# Patient Record
Sex: FEMALE | Race: WHITE | NOT HISPANIC OR LATINO | Employment: OTHER | URBAN - METROPOLITAN AREA
[De-identification: names, ages, dates, MRNs, and addresses within clinical notes are randomized per-mention and may not be internally consistent; named-entity substitution may affect disease eponyms.]

---

## 2017-05-01 DIAGNOSIS — I10 ESSENTIAL HYPERTENSION: ICD-10-CM

## 2017-05-01 RX ORDER — LISINOPRIL AND HYDROCHLOROTHIAZIDE 20; 25 MG/1; MG/1
1 TABLET ORAL DAILY
Qty: 90 TABLET | Refills: 0 | Status: SHIPPED | OUTPATIENT
Start: 2017-05-01 | End: 2018-01-23 | Stop reason: SDUPTHER

## 2017-05-01 NOTE — TELEPHONE ENCOUNTER
----- Message from Darrian Covarrubias sent at 5/1/2017  9:56 AM CDT -----  Contact: Self  REFILL:lisinopril-hydrochlorothiazide (PRINZIDE,ZESTORETIC) 20-25 mg Tab    Patient would like to get a refill says that she is on her last pack. She no longer has [employer] and now have [employer] and she says that Dr. De is not on her plan. I let her know that [facility] does take [employer] and she should either look in her Participating Providers book that usually come with a new insurance packet or call customer service on back of her insurance card to double check.   Patient acknowledged verbally.

## 2018-01-22 DIAGNOSIS — Z11.59 ENCOUNTER FOR HEPATITIS C SCREENING TEST FOR LOW RISK PATIENT: Primary | ICD-10-CM

## 2018-01-23 ENCOUNTER — OFFICE VISIT (OUTPATIENT)
Dept: FAMILY MEDICINE | Facility: CLINIC | Age: 63
End: 2018-01-23
Payer: COMMERCIAL

## 2018-01-23 ENCOUNTER — LAB VISIT (OUTPATIENT)
Dept: LAB | Facility: HOSPITAL | Age: 63
End: 2018-01-23
Attending: FAMILY MEDICINE
Payer: COMMERCIAL

## 2018-01-23 VITALS
HEART RATE: 80 BPM | SYSTOLIC BLOOD PRESSURE: 146 MMHG | BODY MASS INDEX: 39.82 KG/M2 | HEIGHT: 60 IN | RESPIRATION RATE: 14 BRPM | WEIGHT: 202.81 LBS | TEMPERATURE: 98 F | DIASTOLIC BLOOD PRESSURE: 72 MMHG | OXYGEN SATURATION: 97 %

## 2018-01-23 DIAGNOSIS — Z00.00 ANNUAL PHYSICAL EXAM: ICD-10-CM

## 2018-01-23 DIAGNOSIS — Z11.59 ENCOUNTER FOR HEPATITIS C SCREENING TEST FOR LOW RISK PATIENT: ICD-10-CM

## 2018-01-23 DIAGNOSIS — J42 CHRONIC BRONCHITIS, UNSPECIFIED CHRONIC BRONCHITIS TYPE: Primary | ICD-10-CM

## 2018-01-23 DIAGNOSIS — I10 ESSENTIAL HYPERTENSION: ICD-10-CM

## 2018-01-23 LAB
ALBUMIN SERPL BCP-MCNC: 3.4 G/DL
ALP SERPL-CCNC: 67 U/L
ALT SERPL W/O P-5'-P-CCNC: 22 U/L
ANION GAP SERPL CALC-SCNC: 8 MMOL/L
AST SERPL-CCNC: 21 U/L
BASOPHILS # BLD AUTO: 0.05 K/UL
BASOPHILS NFR BLD: 0.6 %
BILIRUB SERPL-MCNC: 0.5 MG/DL
BUN SERPL-MCNC: 18 MG/DL
CALCIUM SERPL-MCNC: 9.6 MG/DL
CHLORIDE SERPL-SCNC: 101 MMOL/L
CHOLEST SERPL-MCNC: 225 MG/DL
CHOLEST/HDLC SERPL: 5 {RATIO}
CO2 SERPL-SCNC: 30 MMOL/L
CREAT SERPL-MCNC: 1.1 MG/DL
DIFFERENTIAL METHOD: NORMAL
EOSINOPHIL # BLD AUTO: 0.2 K/UL
EOSINOPHIL NFR BLD: 2.6 %
ERYTHROCYTE [DISTWIDTH] IN BLOOD BY AUTOMATED COUNT: 13.4 %
EST. GFR  (AFRICAN AMERICAN): >60 ML/MIN/1.73 M^2
EST. GFR  (NON AFRICAN AMERICAN): 53.2 ML/MIN/1.73 M^2
GLUCOSE SERPL-MCNC: 101 MG/DL
HCT VFR BLD AUTO: 45 %
HDLC SERPL-MCNC: 45 MG/DL
HDLC SERPL: 20 %
HGB BLD-MCNC: 14.7 G/DL
IMM GRANULOCYTES # BLD AUTO: 0.03 K/UL
IMM GRANULOCYTES NFR BLD AUTO: 0.3 %
LDLC SERPL CALC-MCNC: 151.4 MG/DL
LYMPHOCYTES # BLD AUTO: 2.1 K/UL
LYMPHOCYTES NFR BLD: 23.4 %
MCH RBC QN AUTO: 29 PG
MCHC RBC AUTO-ENTMCNC: 32.7 G/DL
MCV RBC AUTO: 89 FL
MONOCYTES # BLD AUTO: 0.8 K/UL
MONOCYTES NFR BLD: 9.3 %
NEUTROPHILS # BLD AUTO: 5.6 K/UL
NEUTROPHILS NFR BLD: 63.8 %
NONHDLC SERPL-MCNC: 180 MG/DL
NRBC BLD-RTO: 0 /100 WBC
PLATELET # BLD AUTO: 224 K/UL
PMV BLD AUTO: 12.2 FL
POTASSIUM SERPL-SCNC: 4.8 MMOL/L
PROT SERPL-MCNC: 7.6 G/DL
RBC # BLD AUTO: 5.07 M/UL
SODIUM SERPL-SCNC: 139 MMOL/L
T4 FREE SERPL-MCNC: 1.21 NG/DL
TRIGL SERPL-MCNC: 143 MG/DL
TSH SERPL DL<=0.005 MIU/L-ACNC: 1.69 UIU/ML
WBC # BLD AUTO: 8.79 K/UL

## 2018-01-23 PROCEDURE — 80061 LIPID PANEL: CPT

## 2018-01-23 PROCEDURE — 85025 COMPLETE CBC W/AUTO DIFF WBC: CPT

## 2018-01-23 PROCEDURE — 99396 PREV VISIT EST AGE 40-64: CPT | Mod: S$GLB,,, | Performed by: FAMILY MEDICINE

## 2018-01-23 PROCEDURE — 99999 PR PBB SHADOW E&M-EST. PATIENT-LVL III: CPT | Mod: PBBFAC,,, | Performed by: FAMILY MEDICINE

## 2018-01-23 PROCEDURE — 86803 HEPATITIS C AB TEST: CPT

## 2018-01-23 PROCEDURE — 36415 COLL VENOUS BLD VENIPUNCTURE: CPT | Mod: PO

## 2018-01-23 PROCEDURE — 80053 COMPREHEN METABOLIC PANEL: CPT

## 2018-01-23 PROCEDURE — 84439 ASSAY OF FREE THYROXINE: CPT

## 2018-01-23 PROCEDURE — 84443 ASSAY THYROID STIM HORMONE: CPT

## 2018-01-23 RX ORDER — BUDESONIDE AND FORMOTEROL FUMARATE DIHYDRATE 80; 4.5 UG/1; UG/1
2 AEROSOL RESPIRATORY (INHALATION)
COMMUNITY
End: 2018-10-09

## 2018-01-23 RX ORDER — LISINOPRIL AND HYDROCHLOROTHIAZIDE 20; 25 MG/1; MG/1
1 TABLET ORAL DAILY
Qty: 90 TABLET | Refills: 2 | Status: SHIPPED | OUTPATIENT
Start: 2018-01-23 | End: 2018-10-09 | Stop reason: SDUPTHER

## 2018-01-23 RX ORDER — ALBUTEROL SULFATE 90 UG/1
2 AEROSOL, METERED RESPIRATORY (INHALATION) EVERY 6 HOURS PRN
Qty: 1 EACH | Refills: 11 | Status: SHIPPED | OUTPATIENT
Start: 2018-01-23 | End: 2019-03-22 | Stop reason: SDUPTHER

## 2018-01-23 NOTE — PROGRESS NOTES
Chief Complaint   Patient presents with    Hypertension       HPI  Chanel Esparza is a 62 y.o. female with multiple medical diagnoses as listed in the medical history and problem list that presents for follow up.    Chronic bronchitis - states exacerbation few months ago, needs refills today    HTN - overall doing well, needs refills today also, no CP, HA or blurry vision.     Pt is known to me and was last seen by me on 2016.    PAST MEDICAL HISTORY:  Past Medical History:   Diagnosis Date    Emphysema of lung     Hypertension        PAST SURGICAL HISTORY:  Past Surgical History:   Procedure Laterality Date     SECTION      CHOLECYSTECTOMY      HYSTERECTOMY      CAPRICE       SOCIAL HISTORY:  Social History     Social History    Marital status:      Spouse name: N/A    Number of children: N/A    Years of education: N/A     Occupational History    Not on file.     Social History Main Topics    Smoking status: Light Tobacco Smoker     Types: Cigarettes    Smokeless tobacco: Never Used    Alcohol use No    Drug use: No    Sexual activity: No     Other Topics Concern    Not on file     Social History Narrative    No narrative on file       FAMILY HISTORY:  Family History   Problem Relation Age of Onset    Hypertension Mother        ALLERGIES AND MEDICATIONS: updated and reviewed.  Review of patient's allergies indicates:  No Known Allergies  Current Outpatient Prescriptions   Medication Sig Dispense Refill    aspirin (ECOTRIN) 81 MG EC tablet Take 81 mg by mouth once daily.      budesonide-formoterol 80-4.5 mcg (SYMBICORT) 80-4.5 mcg/actuation HFAA Inhale 2 puffs into the lungs. Controller      CETIRIZINE HCL (ZYRTEC ORAL) Take by mouth.      lisinopril-hydrochlorothiazide (PRINZIDE,ZESTORETIC) 20-25 mg Tab Take 1 tablet by mouth once daily. 90 tablet 2    albuterol 90 mcg/actuation inhaler Inhale 2 puffs into the lungs every 6 (six) hours as needed for Wheezing. 1 each      azithromycin (ZITHROMAX Z-BROOKE) 250 MG tablet 2 tabs today, then 1 tab per day for 4 days. 6 tablet 0     No current facility-administered medications for this visit.        ROS  Review of Systems   Constitutional: Negative for activity change, appetite change and fever.   HENT: Negative for congestion and sore throat.    Eyes: Negative for visual disturbance.   Respiratory: Positive for cough. Negative for shortness of breath.    Cardiovascular: Negative for chest pain.   Gastrointestinal: Negative for abdominal pain, diarrhea, nausea and vomiting.   Endocrine: Negative.    Genitourinary: Negative for dysuria.   Musculoskeletal: Negative for arthralgias and back pain.   Skin: Negative for rash.   Allergic/Immunologic: Negative.    Neurological: Negative for dizziness, weakness and headaches.   Hematological: Negative.    Psychiatric/Behavioral: Negative for agitation and confusion.       Physical Exam  Vitals:    01/23/18 1020   BP: (!) 146/72   Pulse: 80   Resp: 14   Temp: 97.6 °F (36.4 °C)    Body mass index is 39.61 kg/m².  Weight: 92 kg (202 lb 13.2 oz)   Height: 5' (152.4 cm)     Physical Exam   Constitutional: She is oriented to person, place, and time. She appears well-developed and well-nourished.   HENT:   Head: Normocephalic and atraumatic.   Eyes: Conjunctivae and EOM are normal. Pupils are equal, round, and reactive to light.   Neck: Normal range of motion. Neck supple.   Cardiovascular: Normal rate, regular rhythm and normal heart sounds.    Distant HS   Pulmonary/Chest: Effort normal and breath sounds normal.   Abdominal: Soft. Bowel sounds are normal.   Musculoskeletal: Normal range of motion.   Neurological: She is alert and oriented to person, place, and time. She has normal reflexes.   Skin: Skin is warm and dry.   Psychiatric: She has a normal mood and affect. Her behavior is normal. Judgment and thought content normal.       Health Maintenance       Date Due Completion Date    Hepatitis C  Screening 1955 ---    TETANUS VACCINE 09/21/1971 ---    Pneumococcal PPSV23 (Medium Risk) (1) 09/21/1971 ---    Colonoscopy 09/21/2003 ---    Zoster Vaccine 09/21/2013 ---    Mammogram 06/09/2017 6/9/2015 (Done)    Override on 6/9/2015: Done    Lipid Panel 10/14/2020 10/14/2015          Assessment & Plan    Chronic bronchitis, unspecified chronic bronchitis type  -     albuterol 90 mcg/actuation inhaler; Inhale 2 puffs into the lungs every 6 (six) hours as needed for Wheezing.  Dispense: 1 each; Refill: 11    Essential hypertension  -     lisinopril-hydrochlorothiazide (PRINZIDE,ZESTORETIC) 20-25 mg Tab; Take 1 tablet by mouth once daily.  Dispense: 90 tablet; Refill: 2  - Continue current medication regimen as prescribed    Annual physical exam  -     Comprehensive metabolic panel; Future; Expected date: 01/23/2018  -     T4, free; Future; Expected date: 01/23/2018  -     TSH; Future; Expected date: 01/23/2018  -     CBC auto differential; Future; Expected date: 01/23/2018  -     Lipid panel; Future; Expected date: 01/23/2018  - Counseled on age appropriate medical preventative services, including age appropriate cancer screenings, over all nutritional health, need for a consistent exercise regimen and an over all push towards maintaining a vigorous and active lifestyle.      - Counseled on age appropriate vaccines and discussed upcoming health care needs based on age/gender.  Spent time with patient counseling on need for a good patient/doctor relationship moving forward.  Discussed use of common OTC medications and supplements.  Discussed common dietary aids and use of caffeine and the need for good sleep hygiene and stress management.        Follow-up in about 3 months (around 4/23/2018), or if symptoms worsen or fail to improve.

## 2018-01-24 LAB — HCV AB SERPL QL IA: NEGATIVE

## 2018-01-25 ENCOUNTER — TELEPHONE (OUTPATIENT)
Dept: FAMILY MEDICINE | Facility: CLINIC | Age: 63
End: 2018-01-25

## 2018-01-25 NOTE — TELEPHONE ENCOUNTER
----- Message from Jayne Lopez sent at 1/25/2018  9:34 AM CST -----  Contact: [phone]  Pt is requesting a call back in regards to some health concerns Please call pt at your earliest convenience.  Thanks !

## 2018-01-25 NOTE — TELEPHONE ENCOUNTER
Patient stated at [facility], it was discussed to received a new inhaler, patient will call her insurance to see which one is covered and call the office to notify

## 2018-01-31 ENCOUNTER — CLINICAL SUPPORT (OUTPATIENT)
Dept: SMOKING CESSATION | Facility: CLINIC | Age: 63
End: 2018-01-31
Payer: COMMERCIAL

## 2018-01-31 DIAGNOSIS — F17.200 NICOTINE DEPENDENCE: Primary | ICD-10-CM

## 2018-01-31 PROCEDURE — 99999 PR PBB SHADOW E&M-EST. PATIENT-LVL I: CPT | Mod: PBBFAC,,,

## 2018-01-31 PROCEDURE — 99404 PREV MED CNSL INDIV APPRX 60: CPT | Mod: S$GLB,,,

## 2018-01-31 RX ORDER — IBUPROFEN 200 MG
1 TABLET ORAL DAILY
Qty: 14 PATCH | Refills: 0 | Status: SHIPPED | OUTPATIENT
Start: 2018-01-31 | End: 2018-02-28

## 2018-02-01 ENCOUNTER — PATIENT MESSAGE (OUTPATIENT)
Dept: FAMILY MEDICINE | Facility: CLINIC | Age: 63
End: 2018-02-01

## 2018-02-02 ENCOUNTER — PATIENT MESSAGE (OUTPATIENT)
Dept: FAMILY MEDICINE | Facility: CLINIC | Age: 63
End: 2018-02-02

## 2018-02-02 RX ORDER — FLUTICASONE FUROATE AND VILANTEROL 100; 25 UG/1; UG/1
1 POWDER RESPIRATORY (INHALATION) DAILY
Qty: 14 EACH | Refills: 0 | Status: SHIPPED | OUTPATIENT
Start: 2018-02-02 | End: 2018-02-09 | Stop reason: SDUPTHER

## 2018-02-09 RX ORDER — FLUTICASONE FUROATE AND VILANTEROL 100; 25 UG/1; UG/1
1 POWDER RESPIRATORY (INHALATION) DAILY
Qty: 14 EACH | Refills: 12 | Status: SHIPPED | OUTPATIENT
Start: 2018-02-09 | End: 2018-10-09 | Stop reason: SDUPTHER

## 2018-04-05 ENCOUNTER — HOSPITAL ENCOUNTER (EMERGENCY)
Facility: OTHER | Age: 63
Discharge: HOME | End: 2018-04-06
Attending: INTERNAL MEDICINE
Payer: COMMERCIAL

## 2018-04-05 DIAGNOSIS — J45.901 EXACERBATION OF ASTHMA, UNSPECIFIED ASTHMA SEVERITY, UNSPECIFIED WHETHER PERSISTENT: Primary | ICD-10-CM

## 2018-04-05 PROCEDURE — 63600175 PHARM REV CODE 636 W HCPCS: Performed by: INTERNAL MEDICINE

## 2018-04-05 PROCEDURE — 99283 EMERGENCY DEPT VISIT LOW MDM: CPT | Mod: 25

## 2018-04-05 PROCEDURE — 94760 N-INVAS EAR/PLS OXIMETRY 1: CPT

## 2018-04-05 PROCEDURE — 94640 AIRWAY INHALATION TREATMENT: CPT

## 2018-04-05 PROCEDURE — 25000242 PHARM REV CODE 250 ALT 637 W/ HCPCS

## 2018-04-05 RX ORDER — IPRATROPIUM BROMIDE 0.5 MG/2.5ML
SOLUTION RESPIRATORY (INHALATION)
Status: COMPLETED
Start: 2018-04-05 | End: 2018-04-05

## 2018-04-05 RX ORDER — LEVALBUTEROL 1.25 MG/.5ML
SOLUTION, CONCENTRATE RESPIRATORY (INHALATION)
Status: COMPLETED
Start: 2018-04-05 | End: 2018-04-05

## 2018-04-05 RX ORDER — LEVALBUTEROL 1.25 MG/.5ML
1.25 SOLUTION, CONCENTRATE RESPIRATORY (INHALATION) ONCE
Status: COMPLETED | OUTPATIENT
Start: 2018-04-06 | End: 2018-04-05

## 2018-04-05 RX ORDER — PREDNISONE 20 MG/1
60 TABLET ORAL
Status: COMPLETED | OUTPATIENT
Start: 2018-04-05 | End: 2018-04-05

## 2018-04-05 RX ORDER — PREDNISONE 20 MG/1
TABLET ORAL
Status: DISCONTINUED
Start: 2018-04-05 | End: 2018-04-06 | Stop reason: HOSPADM

## 2018-04-05 RX ORDER — IPRATROPIUM BROMIDE 0.5 MG/2.5ML
0.5 SOLUTION RESPIRATORY (INHALATION) ONCE
Status: COMPLETED | OUTPATIENT
Start: 2018-04-06 | End: 2018-04-05

## 2018-04-05 RX ADMIN — LEVALBUTEROL HYDROCHLORIDE 1.25 MG: 1.25 SOLUTION, CONCENTRATE RESPIRATORY (INHALATION) at 11:04

## 2018-04-05 RX ADMIN — IPRATROPIUM BROMIDE 0.5 MG: 0.5 SOLUTION RESPIRATORY (INHALATION) at 11:04

## 2018-04-05 RX ADMIN — PREDNISONE 60 MG: 20 TABLET ORAL at 11:04

## 2018-04-05 RX ADMIN — IPRATROPIUM BROMIDE: 0.5 SOLUTION RESPIRATORY (INHALATION) at 11:04

## 2018-04-05 RX ADMIN — LEVALBUTEROL HYDROCHLORIDE: 1.25 SOLUTION, CONCENTRATE RESPIRATORY (INHALATION) at 11:04

## 2018-04-06 VITALS
WEIGHT: 202 LBS | DIASTOLIC BLOOD PRESSURE: 66 MMHG | SYSTOLIC BLOOD PRESSURE: 141 MMHG | BODY MASS INDEX: 39.66 KG/M2 | HEIGHT: 60 IN | HEART RATE: 91 BPM | RESPIRATION RATE: 18 BRPM | OXYGEN SATURATION: 96 % | TEMPERATURE: 98 F

## 2018-04-06 PROCEDURE — 25000242 PHARM REV CODE 250 ALT 637 W/ HCPCS: Performed by: INTERNAL MEDICINE

## 2018-04-06 RX ORDER — PREDNISONE 20 MG/1
60 TABLET ORAL DAILY
Qty: 15 TABLET | Refills: 0 | Status: SHIPPED | OUTPATIENT
Start: 2018-04-06 | End: 2018-04-10 | Stop reason: ALTCHOICE

## 2018-04-06 RX ORDER — IPRATROPIUM BROMIDE 0.5 MG/2.5ML
0.5 SOLUTION RESPIRATORY (INHALATION) ONCE
Status: COMPLETED | OUTPATIENT
Start: 2018-04-06 | End: 2018-04-06

## 2018-04-06 RX ORDER — LEVALBUTEROL 1.25 MG/.5ML
1.25 SOLUTION, CONCENTRATE RESPIRATORY (INHALATION) ONCE
Status: COMPLETED | OUTPATIENT
Start: 2018-04-06 | End: 2018-04-06

## 2018-04-06 RX ADMIN — IPRATROPIUM BROMIDE 0.5 MG: 0.5 SOLUTION RESPIRATORY (INHALATION) at 01:04

## 2018-04-06 RX ADMIN — LEVALBUTEROL HYDROCHLORIDE 1.25 MG: 1.25 SOLUTION, CONCENTRATE RESPIRATORY (INHALATION) at 01:04

## 2018-04-06 NOTE — ED PROVIDER NOTES
Encounter Date: 2018       History     Chief Complaint   Patient presents with    Shortness of Breath     Pt has been SOB all day but this afternoon got increasingly worse. Pt presents with audible wheezing and using accessory muscles to breath. Hx asthma and COPD, quit smoking x1 month. Reports coughing and congestion as well.     62-year-old female presents to the emergency department complaining of shortness of breath and wheezing times one day.  She states wheezing has become progressively worsened although she has attempted to treat herself at home with albuterol HFA.  Denies fevers/chills, nausea/vomiting.      The history is provided by the patient. No  was used.   Shortness of Breath   This is a recurrent problem. The average episode lasts 1 day. The current episode started yesterday. The problem has not changed since onset.Associated symptoms include cough and wheezing. Pertinent negatives include no fever, no headaches, no hemoptysis, no chest pain, no vomiting, no rash, no leg pain, no leg swelling and no claudication. It is unknown what precipitated the problem. She has tried beta-agonist inhalers for the symptoms. The treatment provided mild relief. She has had prior ED visits. Associated medical issues include asthma and COPD.     Review of patient's allergies indicates:  No Known Allergies  Past Medical History:   Diagnosis Date    Asthma     Emphysema of lung     Hypertension      Past Surgical History:   Procedure Laterality Date     SECTION      CHOLECYSTECTOMY      HYSTERECTOMY      CAPRICE     Family History   Problem Relation Age of Onset    Hypertension Mother      Social History   Substance Use Topics    Smoking status: Former Smoker     Types: Cigarettes     Quit date: 3/5/2018    Smokeless tobacco: Never Used    Alcohol use No     Review of Systems   Constitutional: Negative for fever.   Respiratory: Positive for cough, shortness of breath and wheezing.  Negative for hemoptysis.    Cardiovascular: Negative for chest pain, claudication and leg swelling.   Gastrointestinal: Negative for vomiting.   Skin: Negative for rash.   Neurological: Negative for headaches.   All other systems reviewed and are negative.      Physical Exam     Initial Vitals [04/05/18 2324]   BP Pulse Resp Temp SpO2   (!) 177/100 105 (!) 30 -- 100 %      MAP       125.67         Physical Exam    Nursing note and vitals reviewed.  Constitutional: She appears well-developed and well-nourished. No distress.   Obese   HENT:   Head: Normocephalic and atraumatic.   Right Ear: External ear normal.   Left Ear: External ear normal.   Eyes: EOM are normal.   Neck: Normal range of motion. Neck supple.   Cardiovascular: Regular rhythm and intact distal pulses.   Tachycardia   Pulmonary/Chest: She has wheezes (Inspiratory and expiratory). She has no rhonchi. She has no rales. She exhibits no tenderness.   Abdominal: Soft. Bowel sounds are normal.   Musculoskeletal: Normal range of motion.   Neurological: She is alert.   Skin: Skin is warm and dry.   Psychiatric: She has a normal mood and affect. Thought content normal.         ED Course   Procedures  Labs Reviewed - No data to display          Medical Decision Making:   Initial Assessment:   62-year-old female presents to the emergency department complaining of shortness of breath and wheezing times one day.  She states wheezing has become progressively worsened although she has attempted to treat herself at home with albuterol HFA.  Denies fevers/chills, nausea/vomiting.    ED Management:  Albuterol and Atrovent nebulizer treatment was given as well as oral prednisone.  Patient states she feels much better and repeat physical exam reveals only occasional expiratory wheezes.  Patient was advised to follow-up with her primary care physician tomorrow for reevaluation and was given instructions for asthma/COPD exacerbation.  Prescription for albuterol HFA and  prednisone burst were also given.                      Clinical Impression:   The encounter diagnosis was Exacerbation of asthma, unspecified asthma severity, unspecified whether persistent.    Disposition:   Disposition: Discharged  Condition: Stable                        Brady Apodaca MD  04/06/18 0355

## 2018-04-06 NOTE — ED NOTES
Pt presented to the ED with SOB, audible wheezing, pursed lip breathing and using accessory muscles. Pt is unable to speak in complete sentences. Family reports that the pt has been SOB all day but got increasingly worse tonight at approximately 1830 and at that time the pt did an albuterol neb. Pt does not know what triaged her episode. Resp are labored, wheezing in all lobes, pt reports non-productive cough and some nasal congestion. Denies any CP or any other symptoms.

## 2018-04-10 ENCOUNTER — OFFICE VISIT (OUTPATIENT)
Dept: FAMILY MEDICINE | Facility: CLINIC | Age: 63
End: 2018-04-10
Payer: COMMERCIAL

## 2018-04-10 VITALS
BODY MASS INDEX: 42.73 KG/M2 | TEMPERATURE: 98 F | SYSTOLIC BLOOD PRESSURE: 146 MMHG | WEIGHT: 217.63 LBS | RESPIRATION RATE: 20 BRPM | HEART RATE: 78 BPM | OXYGEN SATURATION: 96 % | DIASTOLIC BLOOD PRESSURE: 86 MMHG | HEIGHT: 60 IN

## 2018-04-10 DIAGNOSIS — I10 ESSENTIAL HYPERTENSION: ICD-10-CM

## 2018-04-10 DIAGNOSIS — J45.901 EXACERBATION OF ASTHMA, UNSPECIFIED ASTHMA SEVERITY, UNSPECIFIED WHETHER PERSISTENT: ICD-10-CM

## 2018-04-10 DIAGNOSIS — I70.0 AORTIC ATHEROSCLEROSIS: ICD-10-CM

## 2018-04-10 DIAGNOSIS — J42 CHRONIC BRONCHITIS, UNSPECIFIED CHRONIC BRONCHITIS TYPE: Primary | ICD-10-CM

## 2018-04-10 PROCEDURE — 3079F DIAST BP 80-89 MM HG: CPT | Mod: CPTII,S$GLB,, | Performed by: FAMILY MEDICINE

## 2018-04-10 PROCEDURE — 99214 OFFICE O/P EST MOD 30 MIN: CPT | Mod: S$GLB,,, | Performed by: FAMILY MEDICINE

## 2018-04-10 PROCEDURE — 99999 PR PBB SHADOW E&M-EST. PATIENT-LVL III: CPT | Mod: PBBFAC,,, | Performed by: FAMILY MEDICINE

## 2018-04-10 PROCEDURE — 3077F SYST BP >= 140 MM HG: CPT | Mod: CPTII,S$GLB,, | Performed by: FAMILY MEDICINE

## 2018-04-10 NOTE — PROGRESS NOTES
Chief Complaint   Patient presents with    ER Follow-up    Asthma       HPI  Chanel Esparza is a 62 y.o. female with multiple medical diagnoses as listed in the medical history and problem list that presents for ER follow up.      Presented to ER for asthma exacerbation - improved after multiple nebs and steroid therapy. Markedly improved, no nebulizer therapy, 2 month hx of tobacco abstinence.     HTN - overall well controlled at home, denies CP, HA or blurry vision. Completed steroid therapy today.     Pt is known to me and was last seen by me on 2018.    PAST MEDICAL HISTORY:  Past Medical History:   Diagnosis Date    Asthma     Emphysema of lung     Hypertension        PAST SURGICAL HISTORY:  Past Surgical History:   Procedure Laterality Date     SECTION      CHOLECYSTECTOMY      HYSTERECTOMY      CAPRICE       SOCIAL HISTORY:  Social History     Social History    Marital status:      Spouse name: N/A    Number of children: N/A    Years of education: N/A     Occupational History    Not on file.     Social History Main Topics    Smoking status: Former Smoker     Types: Cigarettes     Quit date: 3/5/2018    Smokeless tobacco: Never Used    Alcohol use No    Drug use: No    Sexual activity: No     Other Topics Concern    Not on file     Social History Narrative    No narrative on file       FAMILY HISTORY:  Family History   Problem Relation Age of Onset    Hypertension Mother        ALLERGIES AND MEDICATIONS: updated and reviewed.  Review of patient's allergies indicates:  No Known Allergies  Current Outpatient Prescriptions   Medication Sig Dispense Refill    albuterol 90 mcg/actuation inhaler Inhale 2 puffs into the lungs every 6 (six) hours as needed for Wheezing. 1 each 11    aspirin (ECOTRIN) 81 MG EC tablet Take 81 mg by mouth once daily.      budesonide-formoterol 80-4.5 mcg (SYMBICORT) 80-4.5 mcg/actuation HFAA Inhale 2 puffs into the lungs. Controller      CETIRIZINE HCL  (ZYRTEC ORAL) Take by mouth.      fluticasone-vilanterol (BREO ELLIPTA) 100-25 mcg/dose diskus inhaler Inhale 1 puff into the lungs once daily. Controller 14 each 12    lisinopril-hydrochlorothiazide (PRINZIDE,ZESTORETIC) 20-25 mg Tab Take 1 tablet by mouth once daily. 90 tablet 2     No current facility-administered medications for this visit.        ROS  Review of Systems   Constitutional: Negative for activity change, appetite change and fever.   HENT: Negative for congestion and sore throat.    Eyes: Negative for visual disturbance.   Respiratory: Positive for cough and shortness of breath.    Cardiovascular: Negative for chest pain.   Gastrointestinal: Negative for abdominal pain, diarrhea, nausea and vomiting.   Endocrine: Negative.    Genitourinary: Negative for dysuria.   Musculoskeletal: Negative for arthralgias and back pain.   Skin: Negative for rash.   Allergic/Immunologic: Negative.    Neurological: Negative for dizziness, weakness and headaches.   Hematological: Negative.    Psychiatric/Behavioral: Negative for agitation and confusion.       Physical Exam  Vitals:    04/10/18 1108   BP: (!) 146/86   Pulse: 78   Resp: 20   Temp: 97.9 °F (36.6 °C)    Body mass index is 42.5 kg/m².  Weight: 98.7 kg (217 lb 9.5 oz)   Height: 5' (152.4 cm)     Physical Exam   Constitutional: She is oriented to person, place, and time. She appears well-developed and well-nourished.   HENT:   Head: Normocephalic and atraumatic.   Eyes: Conjunctivae and EOM are normal. Pupils are equal, round, and reactive to light.   Neck: Normal range of motion. Neck supple.   Cardiovascular: Normal rate, regular rhythm and normal heart sounds.    Distant HS   Pulmonary/Chest: Effort normal and breath sounds normal.   Dec BS global, no wheeze or rhonchi   Abdominal: Soft. Bowel sounds are normal.   Musculoskeletal: Normal range of motion.   Neurological: She is alert and oriented to person, place, and time. She has normal reflexes.   Skin:  Skin is warm and dry.   Psychiatric: She has a normal mood and affect. Her behavior is normal. Judgment and thought content normal.       Health Maintenance       Date Due Completion Date    High Dose Statin 09/21/1974 ---    Mammogram 01/23/2020 1/23/2018 (Declined)    Override on 1/23/2018: Declined    Override on 6/9/2015: Done    Lipid Panel 01/23/2023 1/23/2018    TETANUS VACCINE 12/05/2024 12/5/2014 (Done)    Override on 12/5/2014: Done    Colonoscopy 01/23/2028 1/23/2018 (Declined)    Override on 1/23/2018: Declined          Assessment & Plan    Chronic bronchitis, unspecified chronic bronchitis type, Exacerbation of asthma, unspecified asthma severity, unspecified whether persistent  - Overall markedly improved  - Discussed action plan for possible future events    Essential hypertension, Aortic atherosclerosis  - Continue current medication regimen as prescribed  - Monitor BP closely, pt completing steroid dose pack today.     Will follow up lipid panel in few months, discussed dietary changes, pt defers medication therapy.     Follow-up in about 3 months (around 7/10/2018), or if symptoms worsen or fail to improve.

## 2018-04-18 ENCOUNTER — CLINICAL SUPPORT (OUTPATIENT)
Dept: SMOKING CESSATION | Facility: CLINIC | Age: 63
End: 2018-04-18
Payer: COMMERCIAL

## 2018-04-18 DIAGNOSIS — F17.200 NICOTINE DEPENDENCE: Primary | ICD-10-CM

## 2018-04-18 PROCEDURE — 99407 BEHAV CHNG SMOKING > 10 MIN: CPT | Mod: S$GLB,,,

## 2018-06-12 DIAGNOSIS — B35.4 TINEA CORPORIS: Primary | ICD-10-CM

## 2018-06-12 RX ORDER — NYSTATIN 100000 U/G
CREAM TOPICAL 2 TIMES DAILY
Qty: 30 G | Refills: 1 | Status: SHIPPED | OUTPATIENT
Start: 2018-06-12 | End: 2020-10-09 | Stop reason: ALTCHOICE

## 2018-06-12 RX ORDER — TERBINAFINE HYDROCHLORIDE 250 MG/1
250 TABLET ORAL DAILY
Qty: 21 TABLET | Refills: 0 | Status: SHIPPED | OUTPATIENT
Start: 2018-06-12 | End: 2018-07-12

## 2018-07-11 ENCOUNTER — TELEPHONE (OUTPATIENT)
Dept: SMOKING CESSATION | Facility: CLINIC | Age: 63
End: 2018-07-11

## 2018-07-12 ENCOUNTER — TELEPHONE (OUTPATIENT)
Dept: SMOKING CESSATION | Facility: CLINIC | Age: 63
End: 2018-07-12

## 2018-07-17 ENCOUNTER — CLINICAL SUPPORT (OUTPATIENT)
Dept: SMOKING CESSATION | Facility: CLINIC | Age: 63
End: 2018-07-17
Payer: COMMERCIAL

## 2018-07-17 DIAGNOSIS — F17.200 NICOTINE DEPENDENCE: Primary | ICD-10-CM

## 2018-07-17 PROCEDURE — 99407 BEHAV CHNG SMOKING > 10 MIN: CPT | Mod: S$GLB,,,

## 2018-07-17 NOTE — PROGRESS NOTES
Called pt to f/u on her 6 month smoking cessation quit status. Pt stated she was tobacco free for 3 months, but then relapsed. Informed her she has benefits available and is able to rejoin. Pt not ready to make appointment. She will call back when ready.

## 2018-08-07 DIAGNOSIS — Z12.39 SCREENING BREAST EXAMINATION: Primary | ICD-10-CM

## 2018-08-20 DIAGNOSIS — Z12.11 COLON CANCER SCREENING: ICD-10-CM

## 2018-10-09 ENCOUNTER — OFFICE VISIT (OUTPATIENT)
Dept: FAMILY MEDICINE | Facility: CLINIC | Age: 63
End: 2018-10-09
Payer: MEDICARE

## 2018-10-09 VITALS
HEIGHT: 60 IN | WEIGHT: 209.69 LBS | SYSTOLIC BLOOD PRESSURE: 125 MMHG | RESPIRATION RATE: 16 BRPM | TEMPERATURE: 98 F | DIASTOLIC BLOOD PRESSURE: 80 MMHG | HEART RATE: 87 BPM | BODY MASS INDEX: 41.17 KG/M2 | OXYGEN SATURATION: 96 %

## 2018-10-09 DIAGNOSIS — I10 ESSENTIAL HYPERTENSION: ICD-10-CM

## 2018-10-09 DIAGNOSIS — Z23 NEED FOR INFLUENZA VACCINATION: Primary | ICD-10-CM

## 2018-10-09 DIAGNOSIS — J42 CHRONIC BRONCHITIS, UNSPECIFIED CHRONIC BRONCHITIS TYPE: ICD-10-CM

## 2018-10-09 DIAGNOSIS — J45.20 MILD INTERMITTENT ASTHMA WITHOUT COMPLICATION: ICD-10-CM

## 2018-10-09 PROBLEM — J45.901 EXACERBATION OF ASTHMA: Status: RESOLVED | Noted: 2018-04-05 | Resolved: 2018-10-09

## 2018-10-09 PROCEDURE — 1101F PT FALLS ASSESS-DOCD LE1/YR: CPT | Mod: CPTII,,, | Performed by: FAMILY MEDICINE

## 2018-10-09 PROCEDURE — 3008F BODY MASS INDEX DOCD: CPT | Mod: CPTII,,, | Performed by: FAMILY MEDICINE

## 2018-10-09 PROCEDURE — 99213 OFFICE O/P EST LOW 20 MIN: CPT | Mod: PBBFAC,PO | Performed by: FAMILY MEDICINE

## 2018-10-09 PROCEDURE — 99214 OFFICE O/P EST MOD 30 MIN: CPT | Mod: S$PBB,,, | Performed by: FAMILY MEDICINE

## 2018-10-09 PROCEDURE — 3074F SYST BP LT 130 MM HG: CPT | Mod: CPTII,,, | Performed by: FAMILY MEDICINE

## 2018-10-09 PROCEDURE — 99999 PR PBB SHADOW E&M-EST. PATIENT-LVL III: CPT | Mod: PBBFAC,,, | Performed by: FAMILY MEDICINE

## 2018-10-09 PROCEDURE — 99499 UNLISTED E&M SERVICE: CPT | Mod: S$GLB,,, | Performed by: FAMILY MEDICINE

## 2018-10-09 PROCEDURE — 3079F DIAST BP 80-89 MM HG: CPT | Mod: CPTII,,, | Performed by: FAMILY MEDICINE

## 2018-10-09 PROCEDURE — 90662 IIV NO PRSV INCREASED AG IM: CPT | Mod: PBBFAC,PO

## 2018-10-09 RX ORDER — FLUTICASONE FUROATE AND VILANTEROL 100; 25 UG/1; UG/1
1 POWDER RESPIRATORY (INHALATION) DAILY
Qty: 14 EACH | Refills: 12 | Status: SHIPPED | OUTPATIENT
Start: 2018-10-09 | End: 2019-01-09 | Stop reason: SDUPTHER

## 2018-10-09 RX ORDER — LISINOPRIL AND HYDROCHLOROTHIAZIDE 20; 25 MG/1; MG/1
1 TABLET ORAL DAILY
Qty: 90 TABLET | Refills: 2 | Status: SHIPPED | OUTPATIENT
Start: 2018-10-09 | End: 2019-01-09 | Stop reason: SDUPTHER

## 2018-10-09 NOTE — PROGRESS NOTES
Chief Complaint   Patient presents with    Flu Vaccine    Medication Refill       HPI  Chanel Esparza is a 63 y.o. female with multiple medical diagnoses as listed in the medical history and problem list that presents for follow up.      HTN - overall doing well, states well controlled at home. +weight loss. Tobacco use 15/day. Bp 118 - 120/80.     Asthma/emphysema - overall stable per patient.     Pt is known to me and was last seen by me on 4/10/2018.    PAST MEDICAL HISTORY:  Past Medical History:   Diagnosis Date    Asthma     Emphysema of lung     Hypertension        PAST SURGICAL HISTORY:  Past Surgical History:   Procedure Laterality Date     SECTION      CHOLECYSTECTOMY      HYSTERECTOMY      CAPRICE       SOCIAL HISTORY:  Social History     Socioeconomic History    Marital status:      Spouse name: Not on file    Number of children: Not on file    Years of education: Not on file    Highest education level: Not on file   Social Needs    Financial resource strain: Not on file    Food insecurity - worry: Not on file    Food insecurity - inability: Not on file    Transportation needs - medical: Not on file    Transportation needs - non-medical: Not on file   Occupational History    Not on file   Tobacco Use    Smoking status: Current Every Day Smoker     Types: Cigarettes     Last attempt to quit: 3/5/2018     Years since quittin.6    Smokeless tobacco: Never Used   Substance and Sexual Activity    Alcohol use: No     Alcohol/week: 0.0 oz    Drug use: No    Sexual activity: No   Other Topics Concern    Not on file   Social History Narrative    Not on file       FAMILY HISTORY:  Family History   Problem Relation Age of Onset    Hypertension Mother        ALLERGIES AND MEDICATIONS: updated and reviewed.  Review of patient's allergies indicates:  No Known Allergies  Current Outpatient Medications   Medication Sig Dispense Refill    albuterol 90 mcg/actuation inhaler Inhale 2  puffs into the lungs every 6 (six) hours as needed for Wheezing. 1 each 11    aspirin (ECOTRIN) 81 MG EC tablet Take 81 mg by mouth once daily.      CETIRIZINE HCL (ZYRTEC ORAL) Take by mouth.      fluticasone-vilanterol (BREO ELLIPTA) 100-25 mcg/dose diskus inhaler Inhale 1 puff into the lungs once daily. Controller 14 each 12    lisinopril-hydrochlorothiazide (PRINZIDE,ZESTORETIC) 20-25 mg Tab Take 1 tablet by mouth once daily. 90 tablet 2    nystatin (MYCOSTATIN) cream Apply topically 2 (two) times daily. 30 g 1     No current facility-administered medications for this visit.        ROS  Review of Systems   Constitutional: Negative for activity change, appetite change and fever.   HENT: Negative for congestion and sore throat.    Eyes: Negative for visual disturbance.   Respiratory: Negative for cough and shortness of breath.    Cardiovascular: Negative for chest pain.   Gastrointestinal: Negative for abdominal pain, diarrhea, nausea and vomiting.   Endocrine: Negative.    Genitourinary: Negative for dysuria.   Musculoskeletal: Negative for arthralgias and back pain.   Skin: Negative for rash.   Allergic/Immunologic: Negative.    Neurological: Negative for dizziness, weakness and headaches.   Hematological: Negative.    Psychiatric/Behavioral: Negative for agitation and confusion.       Physical Exam  Vitals:    10/09/18 0952   BP: 125/80   Pulse:    Resp:    Temp:     Body mass index is 40.95 kg/m².  Weight: 95.1 kg (209 lb 10.5 oz)   Height: 5' (152.4 cm)     Physical Exam   Constitutional: She is oriented to person, place, and time. She appears well-developed and well-nourished.   HENT:   Head: Normocephalic and atraumatic.   Eyes: Conjunctivae and EOM are normal. Pupils are equal, round, and reactive to light.   Neck: Normal range of motion. Neck supple.   Cardiovascular: Normal rate, regular rhythm and normal heart sounds.   Pulmonary/Chest: Effort normal and breath sounds normal.   Abdominal: Soft.  Bowel sounds are normal.   Musculoskeletal: Normal range of motion.   Neurological: She is alert and oriented to person, place, and time. She has normal reflexes.   Skin: Skin is warm and dry.   Psychiatric: She has a normal mood and affect. Her behavior is normal. Judgment and thought content normal.       Health Maintenance       Date Due Completion Date    High Dose Statin 09/21/1974 ---    DEXA SCAN 09/21/1993 ---    Influenza Vaccine 08/01/2018 12/11/2017 (Done)    Override on 12/11/2017: Done    Pneumococcal (65+) (1 of 2 - PCV13) 09/21/2018 ---    Mammogram 08/17/2020 8/17/2018 (Declined)    Override on 8/17/2018: Declined    Override on 1/23/2018: Declined    Override on 6/9/2015: Done    Lipid Panel 01/23/2023 1/23/2018    TETANUS VACCINE 12/05/2024 12/5/2014 (Done)    Override on 12/5/2014: Done    Colonoscopy 01/23/2028 1/23/2018 (Declined)    Override on 1/23/2018: Declined          Assessment & Plan    Need for influenza vaccination  -     Influenza - High Dose (65+) (PF) (IM)    Essential hypertension  -     lisinopril-hydrochlorothiazide (PRINZIDE,ZESTORETIC) 20-25 mg Tab; Take 1 tablet by mouth once daily.  Dispense: 90 tablet; Refill: 2  - Continue current medication regimen as prescribed    Chronic bronchitis, unspecified chronic bronchitis type, Asthma  -     fluticasone-vilanterol (BREO ELLIPTA) 100-25 mcg/dose diskus inhaler; Inhale 1 puff into the lungs once daily. Controller  Dispense: 14 each; Refill: 12  - Continue current medication regimen as prescribed        Follow-up in about 3 months (around 1/9/2019).

## 2018-10-24 NOTE — PROGRESS NOTES
Patient, Chanel Esparza (MRN #[id number]), presented with a recorded BMI of 40.95 kg/m^2 consistent with the definition of morbid obesity (ICD-10 E66.01). The patient's morbid obesity was monitored, evaluated, addressed and/or treated. This addendum to the medical record is made on 10/24/2018.

## 2018-12-27 ENCOUNTER — LAB VISIT (OUTPATIENT)
Dept: LAB | Facility: HOSPITAL | Age: 63
End: 2018-12-27
Attending: FAMILY MEDICINE
Payer: MEDICARE

## 2018-12-27 DIAGNOSIS — Z00.00 ANNUAL PHYSICAL EXAM: ICD-10-CM

## 2018-12-27 PROCEDURE — 36415 COLL VENOUS BLD VENIPUNCTURE: CPT | Mod: PO

## 2018-12-27 PROCEDURE — 86803 HEPATITIS C AB TEST: CPT

## 2018-12-28 LAB — HCV AB SERPL QL IA: NEGATIVE

## 2019-01-09 ENCOUNTER — OFFICE VISIT (OUTPATIENT)
Dept: FAMILY MEDICINE | Facility: CLINIC | Age: 64
End: 2019-01-09
Payer: MEDICARE

## 2019-01-09 VITALS
OXYGEN SATURATION: 97 % | TEMPERATURE: 98 F | BODY MASS INDEX: 41.03 KG/M2 | WEIGHT: 209 LBS | SYSTOLIC BLOOD PRESSURE: 138 MMHG | HEIGHT: 60 IN | HEART RATE: 88 BPM | DIASTOLIC BLOOD PRESSURE: 80 MMHG | RESPIRATION RATE: 18 BRPM

## 2019-01-09 DIAGNOSIS — H40.89 OTHER GLAUCOMA OF BOTH EYES: ICD-10-CM

## 2019-01-09 DIAGNOSIS — J45.20 MILD INTERMITTENT ASTHMA WITHOUT COMPLICATION: Primary | ICD-10-CM

## 2019-01-09 DIAGNOSIS — J42 CHRONIC BRONCHITIS, UNSPECIFIED CHRONIC BRONCHITIS TYPE: ICD-10-CM

## 2019-01-09 DIAGNOSIS — I10 ESSENTIAL HYPERTENSION: ICD-10-CM

## 2019-01-09 PROCEDURE — 99499 RISK ADDL DX/OHS AUDIT: ICD-10-PCS | Mod: S$GLB,,, | Performed by: FAMILY MEDICINE

## 2019-01-09 PROCEDURE — 3079F DIAST BP 80-89 MM HG: CPT | Mod: CPTII,S$GLB,, | Performed by: FAMILY MEDICINE

## 2019-01-09 PROCEDURE — 1101F PR PT FALLS ASSESS DOC 0-1 FALLS W/OUT INJ PAST YR: ICD-10-PCS | Mod: CPTII,S$GLB,, | Performed by: FAMILY MEDICINE

## 2019-01-09 PROCEDURE — 99999 PR PBB SHADOW E&M-EST. PATIENT-LVL III: CPT | Mod: PBBFAC,,, | Performed by: FAMILY MEDICINE

## 2019-01-09 PROCEDURE — 99214 PR OFFICE/OUTPT VISIT, EST, LEVL IV, 30-39 MIN: ICD-10-PCS | Mod: S$GLB,,, | Performed by: FAMILY MEDICINE

## 2019-01-09 PROCEDURE — 99214 OFFICE O/P EST MOD 30 MIN: CPT | Mod: S$GLB,,, | Performed by: FAMILY MEDICINE

## 2019-01-09 PROCEDURE — 3075F SYST BP GE 130 - 139MM HG: CPT | Mod: CPTII,S$GLB,, | Performed by: FAMILY MEDICINE

## 2019-01-09 PROCEDURE — 3008F PR BODY MASS INDEX (BMI) DOCUMENTED: ICD-10-PCS | Mod: CPTII,S$GLB,, | Performed by: FAMILY MEDICINE

## 2019-01-09 PROCEDURE — 3075F PR MOST RECENT SYSTOLIC BLOOD PRESS GE 130-139MM HG: ICD-10-PCS | Mod: CPTII,S$GLB,, | Performed by: FAMILY MEDICINE

## 2019-01-09 PROCEDURE — 99999 PR PBB SHADOW E&M-EST. PATIENT-LVL III: ICD-10-PCS | Mod: PBBFAC,,, | Performed by: FAMILY MEDICINE

## 2019-01-09 PROCEDURE — 3079F PR MOST RECENT DIASTOLIC BLOOD PRESSURE 80-89 MM HG: ICD-10-PCS | Mod: CPTII,S$GLB,, | Performed by: FAMILY MEDICINE

## 2019-01-09 PROCEDURE — 3008F BODY MASS INDEX DOCD: CPT | Mod: CPTII,S$GLB,, | Performed by: FAMILY MEDICINE

## 2019-01-09 PROCEDURE — 1101F PT FALLS ASSESS-DOCD LE1/YR: CPT | Mod: CPTII,S$GLB,, | Performed by: FAMILY MEDICINE

## 2019-01-09 PROCEDURE — 99499 UNLISTED E&M SERVICE: CPT | Mod: S$GLB,,, | Performed by: FAMILY MEDICINE

## 2019-01-09 RX ORDER — FLUTICASONE FUROATE AND VILANTEROL 100; 25 UG/1; UG/1
1 POWDER RESPIRATORY (INHALATION) DAILY
Qty: 14 EACH | Refills: 12 | Status: SHIPPED | OUTPATIENT
Start: 2019-01-09 | End: 2019-10-01 | Stop reason: SDUPTHER

## 2019-01-09 RX ORDER — LISINOPRIL AND HYDROCHLOROTHIAZIDE 20; 25 MG/1; MG/1
1 TABLET ORAL DAILY
Qty: 90 TABLET | Refills: 2 | Status: SHIPPED | OUTPATIENT
Start: 2019-01-09 | End: 2019-03-22 | Stop reason: SDUPTHER

## 2019-01-09 NOTE — PROGRESS NOTES
Chief Complaint   Patient presents with    Follow-up       HPI  Chanel Esparza is a 63 y.o. female with multiple medical diagnoses as listed in the medical history and problem list that presents for follow up.    HTN - pverall doing well, complaint with medications.     Chronic bronchitis - overall doing well with current meds, daily breo use and decreased rescue use.     Pt is known to me and was last seen by me on 10/9/2018.    PAST MEDICAL HISTORY:  Past Medical History:   Diagnosis Date    Asthma     Emphysema of lung     Hypertension        PAST SURGICAL HISTORY:  Past Surgical History:   Procedure Laterality Date     SECTION      CHOLECYSTECTOMY      HYSTERECTOMY      CAPRICE       SOCIAL HISTORY:  Social History     Socioeconomic History    Marital status:      Spouse name: Not on file    Number of children: Not on file    Years of education: Not on file    Highest education level: Not on file   Social Needs    Financial resource strain: Not on file    Food insecurity - worry: Not on file    Food insecurity - inability: Not on file    Transportation needs - medical: Not on file    Transportation needs - non-medical: Not on file   Occupational History    Not on file   Tobacco Use    Smoking status: Current Every Day Smoker     Types: Cigarettes     Last attempt to quit: 3/5/2018     Years since quittin.8    Smokeless tobacco: Never Used   Substance and Sexual Activity    Alcohol use: No     Alcohol/week: 0.0 oz    Drug use: No    Sexual activity: No   Other Topics Concern    Not on file   Social History Narrative    Not on file       FAMILY HISTORY:  Family History   Problem Relation Age of Onset    Hypertension Mother        ALLERGIES AND MEDICATIONS: updated and reviewed.  Review of patient's allergies indicates:  No Known Allergies  Current Outpatient Medications   Medication Sig Dispense Refill    albuterol 90 mcg/actuation inhaler Inhale 2 puffs into the lungs every 6  (six) hours as needed for Wheezing. 1 each 11    aspirin (ECOTRIN) 81 MG EC tablet Take 81 mg by mouth once daily.      CETIRIZINE HCL (ZYRTEC ORAL) Take by mouth.      fluticasone-vilanterol (BREO ELLIPTA) 100-25 mcg/dose diskus inhaler Inhale 1 puff into the lungs once daily. Controller 14 each 12    lisinopril-hydrochlorothiazide (PRINZIDE,ZESTORETIC) 20-25 mg Tab Take 1 tablet by mouth once daily. 90 tablet 2    nystatin (MYCOSTATIN) cream Apply topically 2 (two) times daily. 30 g 1     No current facility-administered medications for this visit.        ROS  Review of Systems   Constitutional: Negative for activity change, appetite change and fever.   HENT: Negative for congestion and sore throat.    Eyes: Negative for visual disturbance.   Respiratory: Positive for cough.    Cardiovascular: Negative for chest pain.   Gastrointestinal: Negative for abdominal pain, diarrhea, nausea and vomiting.   Endocrine: Negative.    Genitourinary: Negative for dysuria.   Musculoskeletal: Negative for arthralgias and back pain.   Skin: Negative for rash.   Allergic/Immunologic: Negative.    Neurological: Negative for dizziness, weakness and headaches.   Hematological: Negative.    Psychiatric/Behavioral: Negative for agitation and confusion.       Physical Exam  Vitals:    01/09/19 0906   BP: 138/80   Pulse: 88   Resp: 18   Temp: 97.7 °F (36.5 °C)    Body mass index is 40.82 kg/m².  Weight: 94.8 kg (208 lb 15.9 oz)   Height: 5' (152.4 cm)     Physical Exam   Constitutional: She is oriented to person, place, and time. She appears well-developed and well-nourished.   HENT:   Head: Normocephalic and atraumatic.   Eyes: Conjunctivae and EOM are normal. Pupils are equal, round, and reactive to light.   Neck: Normal range of motion. Neck supple.   Cardiovascular: Normal rate, regular rhythm and normal heart sounds.   Pulmonary/Chest: Effort normal and breath sounds normal.   Abdominal: Soft. Bowel sounds are normal.    Musculoskeletal: Normal range of motion.   Neurological: She is alert and oriented to person, place, and time. She has normal reflexes.   Skin: Skin is warm and dry.   Psychiatric: She has a normal mood and affect. Her behavior is normal. Judgment and thought content normal.       Health Maintenance       Date Due Completion Date    DEXA SCAN 09/21/1993 ---    High Dose Statin 01/22/2019 (Originally 9/21/1974) ---    Pneumococcal Vaccine (65+ Low/Medium Risk) (1 of 2 - PCV13) 10/09/2019 (Originally 9/21/2018) ---    Mammogram 08/17/2020 8/17/2018 (Declined)    Override on 8/17/2018: Declined    Override on 1/23/2018: Declined    Override on 6/9/2015: Done    Lipid Panel 01/23/2023 1/23/2018    TETANUS VACCINE 12/05/2024 12/5/2014 (Done)    Override on 12/5/2014: Done    Colonoscopy 01/23/2028 1/23/2018 (Declined)    Override on 1/23/2018: Declined          Assessment & Plan    Mild intermittent asthma without complication, Chronic bronchitis, unspecified chronic bronchitis type  -     fluticasone-vilanterol (BREO ELLIPTA) 100-25 mcg/dose diskus inhaler; Inhale 1 puff into the lungs once daily. Controller  Dispense: 14 each; Refill: 12  - Continue current medication regimen as prescribed  - Overall stable    Essential hypertension  -     lisinopril-hydrochlorothiazide (PRINZIDE,ZESTORETIC) 20-25 mg Tab; Take 1 tablet by mouth once daily.  Dispense: 90 tablet; Refill: 2  - Continue current medication regimen as prescribed  - Overall doing well.     Discussed weight loss and exercise.     Follow-up in about 3 months (around 4/9/2019), or if symptoms worsen or fail to improve.

## 2019-01-09 NOTE — PROGRESS NOTES
Patient, Chanel Esparza (MRN #[id number]), presented with a recorded BMI of 40.82 kg/m^2 consistent with the definition of morbid obesity (ICD-10 E66.01). The patient's morbid obesity was monitored, evaluated, addressed and/or treated. This addendum to the medical record is made on 01/09/2019.

## 2019-01-29 ENCOUNTER — CLINICAL SUPPORT (OUTPATIENT)
Dept: SMOKING CESSATION | Facility: CLINIC | Age: 64
End: 2019-01-29
Payer: COMMERCIAL

## 2019-01-29 DIAGNOSIS — F17.200 NICOTINE DEPENDENCE: Primary | ICD-10-CM

## 2019-01-29 PROCEDURE — 99407 PR TOBACCO USE CESSATION INTENSIVE >10 MINUTES: ICD-10-PCS | Mod: S$GLB,,,

## 2019-01-29 PROCEDURE — 99407 BEHAV CHNG SMOKING > 10 MIN: CPT | Mod: S$GLB,,,

## 2019-01-29 NOTE — PROGRESS NOTES
Called pt to f/u on her 12 month smoking cessation quit status. Pt stated she is still smoking. Informed her she has benefits available and is able to rejoin. Pt not ready to quit. She will call back when ready. Informed her of benefit period, phone follow ups, and contact information. Will complete smart form and resolve quit #1 episode.

## 2019-02-07 ENCOUNTER — TELEPHONE (OUTPATIENT)
Dept: FAMILY MEDICINE | Facility: CLINIC | Age: 64
End: 2019-02-07

## 2019-02-07 NOTE — TELEPHONE ENCOUNTER
----- Message from Alva Oro sent at 2/7/2019  9:06 AM CST -----  Contact: self - [phone]  Pt asking for call back to ask for a prescription for a Zpack for a cold that she cannot get rid of.      ...  [facility] [address] [place] [address] LEANDRO WILKES [address] [place] [address] [address]  Phone: [phone] Fax: [phone]

## 2019-02-08 RX ORDER — AZITHROMYCIN 250 MG/1
TABLET, FILM COATED ORAL
Qty: 6 TABLET | Refills: 0 | Status: SHIPPED | OUTPATIENT
Start: 2019-02-08 | End: 2019-02-13

## 2019-03-22 ENCOUNTER — OFFICE VISIT (OUTPATIENT)
Dept: FAMILY MEDICINE | Facility: CLINIC | Age: 64
End: 2019-03-22
Payer: MEDICARE

## 2019-03-22 ENCOUNTER — HOSPITAL ENCOUNTER (OUTPATIENT)
Dept: RADIOLOGY | Facility: HOSPITAL | Age: 64
Discharge: HOME | End: 2019-03-22
Attending: FAMILY MEDICINE
Payer: MEDICARE

## 2019-03-22 VITALS
SYSTOLIC BLOOD PRESSURE: 124 MMHG | WEIGHT: 204.38 LBS | OXYGEN SATURATION: 95 % | HEART RATE: 87 BPM | DIASTOLIC BLOOD PRESSURE: 78 MMHG | RESPIRATION RATE: 20 BRPM | TEMPERATURE: 97 F | HEIGHT: 60 IN | BODY MASS INDEX: 40.13 KG/M2

## 2019-03-22 DIAGNOSIS — J44.1 ACUTE EXACERBATION OF COPD WITH ASTHMA: ICD-10-CM

## 2019-03-22 DIAGNOSIS — J44.1 ACUTE EXACERBATION OF COPD WITH ASTHMA: Primary | ICD-10-CM

## 2019-03-22 DIAGNOSIS — I70.0 AORTIC ATHEROSCLEROSIS: ICD-10-CM

## 2019-03-22 DIAGNOSIS — J45.901 ACUTE EXACERBATION OF COPD WITH ASTHMA: ICD-10-CM

## 2019-03-22 DIAGNOSIS — J45.901 ACUTE EXACERBATION OF COPD WITH ASTHMA: Primary | ICD-10-CM

## 2019-03-22 DIAGNOSIS — Z72.0 TOBACCO USE: ICD-10-CM

## 2019-03-22 DIAGNOSIS — J45.20 MILD INTERMITTENT ASTHMA WITHOUT COMPLICATION: ICD-10-CM

## 2019-03-22 DIAGNOSIS — I10 ESSENTIAL HYPERTENSION: ICD-10-CM

## 2019-03-22 PROCEDURE — 99214 PR OFFICE/OUTPT VISIT, EST, LEVL IV, 30-39 MIN: ICD-10-PCS | Mod: S$GLB,,, | Performed by: FAMILY MEDICINE

## 2019-03-22 PROCEDURE — 99999 PR PBB SHADOW E&M-EST. PATIENT-LVL III: ICD-10-PCS | Mod: PBBFAC,,, | Performed by: FAMILY MEDICINE

## 2019-03-22 PROCEDURE — 99999 PR PBB SHADOW E&M-EST. PATIENT-LVL III: CPT | Mod: PBBFAC,,, | Performed by: FAMILY MEDICINE

## 2019-03-22 PROCEDURE — 3078F DIAST BP <80 MM HG: CPT | Mod: CPTII,S$GLB,, | Performed by: FAMILY MEDICINE

## 2019-03-22 PROCEDURE — 3008F BODY MASS INDEX DOCD: CPT | Mod: CPTII,S$GLB,, | Performed by: FAMILY MEDICINE

## 2019-03-22 PROCEDURE — 71046 XR CHEST PA AND LATERAL: ICD-10-PCS | Mod: 26,,, | Performed by: RADIOLOGY

## 2019-03-22 PROCEDURE — 3008F PR BODY MASS INDEX (BMI) DOCUMENTED: ICD-10-PCS | Mod: CPTII,S$GLB,, | Performed by: FAMILY MEDICINE

## 2019-03-22 PROCEDURE — 1101F PR PT FALLS ASSESS DOC 0-1 FALLS W/OUT INJ PAST YR: ICD-10-PCS | Mod: CPTII,S$GLB,, | Performed by: FAMILY MEDICINE

## 2019-03-22 PROCEDURE — 71046 X-RAY EXAM CHEST 2 VIEWS: CPT | Mod: TC,FY,PO

## 2019-03-22 PROCEDURE — 99499 UNLISTED E&M SERVICE: CPT | Mod: S$GLB,,, | Performed by: FAMILY MEDICINE

## 2019-03-22 PROCEDURE — 1101F PT FALLS ASSESS-DOCD LE1/YR: CPT | Mod: CPTII,S$GLB,, | Performed by: FAMILY MEDICINE

## 2019-03-22 PROCEDURE — 3074F SYST BP LT 130 MM HG: CPT | Mod: CPTII,S$GLB,, | Performed by: FAMILY MEDICINE

## 2019-03-22 PROCEDURE — 71046 X-RAY EXAM CHEST 2 VIEWS: CPT | Mod: 26,,, | Performed by: RADIOLOGY

## 2019-03-22 PROCEDURE — 3074F PR MOST RECENT SYSTOLIC BLOOD PRESSURE < 130 MM HG: ICD-10-PCS | Mod: CPTII,S$GLB,, | Performed by: FAMILY MEDICINE

## 2019-03-22 PROCEDURE — 99499 RISK ADDL DX/OHS AUDIT: ICD-10-PCS | Mod: S$GLB,,, | Performed by: FAMILY MEDICINE

## 2019-03-22 PROCEDURE — 99214 OFFICE O/P EST MOD 30 MIN: CPT | Mod: S$GLB,,, | Performed by: FAMILY MEDICINE

## 2019-03-22 PROCEDURE — 3078F PR MOST RECENT DIASTOLIC BLOOD PRESSURE < 80 MM HG: ICD-10-PCS | Mod: CPTII,S$GLB,, | Performed by: FAMILY MEDICINE

## 2019-03-22 RX ORDER — ALBUTEROL SULFATE 90 UG/1
2 AEROSOL, METERED RESPIRATORY (INHALATION) EVERY 6 HOURS PRN
Qty: 1 EACH | Refills: 11 | Status: SHIPPED | OUTPATIENT
Start: 2019-03-22 | End: 2020-01-13 | Stop reason: SDUPTHER

## 2019-03-22 RX ORDER — LATANOPROST 50 UG/ML
SOLUTION/ DROPS OPHTHALMIC
Refills: 0 | COMMUNITY
Start: 2019-03-02 | End: 2023-09-26 | Stop reason: CLARIF

## 2019-03-22 RX ORDER — IPRATROPIUM BROMIDE AND ALBUTEROL SULFATE 2.5; .5 MG/3ML; MG/3ML
3 SOLUTION RESPIRATORY (INHALATION) EVERY 6 HOURS PRN
Qty: 1 BOX | Refills: 6 | Status: SHIPPED | OUTPATIENT
Start: 2019-03-22 | End: 2021-02-26 | Stop reason: SDUPTHER

## 2019-03-22 RX ORDER — PREDNISONE 20 MG/1
60 TABLET ORAL DAILY
Qty: 15 TABLET | Refills: 0 | Status: SHIPPED | OUTPATIENT
Start: 2019-03-22 | End: 2019-03-27

## 2019-03-22 RX ORDER — AMOXICILLIN AND CLAVULANATE POTASSIUM 875; 125 MG/1; MG/1
1 TABLET, FILM COATED ORAL EVERY 12 HOURS
Qty: 14 TABLET | Refills: 0 | Status: SHIPPED | OUTPATIENT
Start: 2019-03-22 | End: 2019-03-29

## 2019-03-22 RX ORDER — BENZONATATE 100 MG/1
100 CAPSULE ORAL 3 TIMES DAILY PRN
Qty: 30 CAPSULE | Refills: 0 | Status: SHIPPED | OUTPATIENT
Start: 2019-03-22 | End: 2019-04-01

## 2019-03-22 RX ORDER — LISINOPRIL AND HYDROCHLOROTHIAZIDE 20; 25 MG/1; MG/1
1 TABLET ORAL DAILY
Qty: 90 TABLET | Refills: 2 | Status: SHIPPED | OUTPATIENT
Start: 2019-03-22 | End: 2020-05-04 | Stop reason: SDUPTHER

## 2019-03-22 NOTE — PROGRESS NOTES
Patient, Chanel Esparza (MRN #[id number]), presented with a recorded BMI of 39.91 kg/m^2 and a documented comorbidity(s):  - Hypertension  to which the severe obesity is a contributing factor. This is consistent with the definition of severe obesity (BMI 35.0-39.9) with comorbidity (ICD-10 E66.01, Z68.35). The patient's severe obesity was monitored, evaluated, addressed and/or treated. This addendum to the medical record is made on 03/22/2019.

## 2019-03-22 NOTE — PROGRESS NOTES
Subjective:       Patient ID: Chanel Esparza is a 63 y.o. female.    Chief Complaint: URI    HPI   62 yo female presents for URI symptoms. States she had it for 5 weeks. Received zpak about 5 weeks ago but states that did not help. Endorses prod cough, congestion, sob, wheezing.     Review of Systems   Constitutional: Negative.    HENT: Negative.    Respiratory: Negative.    Cardiovascular: Negative.    Gastrointestinal: Negative.    Genitourinary: Negative.    Musculoskeletal: Negative.    Neurological: Negative.    Psychiatric/Behavioral: Negative.         Past Medical History:   Diagnosis Date    Asthma     Emphysema of lung     Hypertension      Past Surgical History:   Procedure Laterality Date     SECTION      CHOLECYSTECTOMY      HYSTERECTOMY      CAPRICE     Family History   Problem Relation Age of Onset    Hypertension Mother      Social History     Socioeconomic History    Marital status:      Spouse name: None    Number of children: None    Years of education: None    Highest education level: None   Social Needs    Financial resource strain: None    Food insecurity - worry: None    Food insecurity - inability: None    Transportation needs - medical: None    Transportation needs - non-medical: None   Occupational History    None   Tobacco Use    Smoking status: Current Every Day Smoker     Types: Cigarettes     Last attempt to quit: 3/5/2018     Years since quittin.0    Smokeless tobacco: Never Used   Substance and Sexual Activity    Alcohol use: No     Alcohol/week: 0.0 oz    Drug use: No    Sexual activity: No   Other Topics Concern    None   Social History Narrative    None        Objective:      Vitals:    19 0951   BP: 124/78   Pulse: 87   Resp: 20   Temp: 97.4 °F (36.3 °C)     Physical Exam   Constitutional: She is oriented to person, place, and time. No distress.   HENT:   Head: Normocephalic and atraumatic.   Eyes: Conjunctivae are normal.   Neck: Neck  supple.   Cardiovascular: Normal rate, regular rhythm and normal heart sounds. Exam reveals no gallop and no friction rub.   No murmur heard.  Pulmonary/Chest: Effort normal and breath sounds normal. She has no wheezes. She has no rales.   Crackles on RLL   Musculoskeletal: She exhibits no edema.   Neurological: She is alert and oriented to person, place, and time.   Skin: Skin is warm and dry.   Psychiatric: She has a normal mood and affect. Her behavior is normal. Judgment and thought content normal.        Assessment:       1. Acute exacerbation of COPD with asthma    2. Essential hypertension    3. Aortic atherosclerosis    4. Mild intermittent asthma without complication    5. Tobacco use        Plan:       Acute exacerbation of COPD with asthma  -     albuterol (PROVENTIL/VENTOLIN HFA) 90 mcg/actuation inhaler; Inhale 2 puffs into the lungs every 6 (six) hours as needed for Wheezing.  Dispense: 1 each; Refill: 11  -     lisinopril-hydrochlorothiazide (PRINZIDE,ZESTORETIC) 20-25 mg Tab; Take 1 tablet by mouth once daily.  Dispense: 90 tablet; Refill: 2  -     NEBULIZER KIT (SUPPLIES) FOR HOME USE  -     NEBULIZER FOR HOME USE  -     X-Ray Chest PA And Lateral; Future; Expected date: 03/22/2019  -     amoxicillin-clavulanate 875-125mg (AUGMENTIN) 875-125 mg per tablet; Take 1 tablet by mouth every 12 (twelve) hours. for 7 days  Dispense: 14 tablet; Refill: 0  -     predniSONE (DELTASONE) 20 MG tablet; Take 3 tablets (60 mg total) by mouth once daily. for 5 days  Dispense: 15 tablet; Refill: 0  -     benzonatate (TESSALON) 100 MG capsule; Take 1 capsule (100 mg total) by mouth 3 (three) times daily as needed.  Dispense: 30 capsule; Refill: 0  -     albuterol-ipratropium (DUO-NEB) 2.5 mg-0.5 mg/3 mL nebulizer solution; Take 3 mLs by nebulization every 6 (six) hours as needed for Wheezing or Shortness of Breath. Rescue  Dispense: 1 Box; Refill: 6    Essential hypertension  -     lisinopril-hydrochlorothiazide  (PRINZIDE,ZESTORETIC) 20-25 mg Tab; Take 1 tablet by mouth once daily.  Dispense: 90 tablet; Refill: 2    Aortic atherosclerosis  -     lisinopril-hydrochlorothiazide (PRINZIDE,ZESTORETIC) 20-25 mg Tab; Take 1 tablet by mouth once daily.  Dispense: 90 tablet; Refill: 2    Tobacco use  Importance of smoking cessation discussed with patient. 5 minutes spent counseling patient on risks of smoking, benefits of stopping and ways of stopping. Patient not ready to quit.      Follow-up in about 4 weeks (around 4/19/2019).            Jay Edwards MD  3/22/2019 10:02 AM

## 2019-06-22 ENCOUNTER — HOSPITAL ENCOUNTER (EMERGENCY)
Facility: HOSPITAL | Age: 64
Discharge: HOME | End: 2019-06-22
Attending: EMERGENCY MEDICINE
Payer: MEDICARE

## 2019-06-22 VITALS
TEMPERATURE: 98 F | HEART RATE: 98 BPM | BODY MASS INDEX: 21.01 KG/M2 | DIASTOLIC BLOOD PRESSURE: 80 MMHG | HEIGHT: 60 IN | OXYGEN SATURATION: 98 % | SYSTOLIC BLOOD PRESSURE: 172 MMHG | WEIGHT: 107 LBS | RESPIRATION RATE: 19 BRPM

## 2019-06-22 DIAGNOSIS — M95.11 CAULIFLOWER EAR, RIGHT: Primary | ICD-10-CM

## 2019-06-22 PROCEDURE — 63600175 PHARM REV CODE 636 W HCPCS: Mod: ER | Performed by: NURSE PRACTITIONER

## 2019-06-22 PROCEDURE — 96372 THER/PROPH/DIAG INJ SC/IM: CPT | Mod: ER

## 2019-06-22 PROCEDURE — 99284 EMERGENCY DEPT VISIT MOD MDM: CPT | Mod: 25,ER

## 2019-06-22 RX ORDER — DEXAMETHASONE SODIUM PHOSPHATE 4 MG/ML
8 INJECTION, SOLUTION INTRA-ARTICULAR; INTRALESIONAL; INTRAMUSCULAR; INTRAVENOUS; SOFT TISSUE
Status: COMPLETED | OUTPATIENT
Start: 2019-06-22 | End: 2019-06-22

## 2019-06-22 RX ADMIN — DEXAMETHASONE SODIUM PHOSPHATE 8 MG: 4 INJECTION, SOLUTION INTRA-ARTICULAR; INTRALESIONAL; INTRAMUSCULAR; INTRAVENOUS; SOFT TISSUE at 07:06

## 2019-06-23 NOTE — ED PROVIDER NOTES
Encounter Date: 2019    SCRIBE #1 NOTE: I, Carmen Edwards, am scribing for, and in the presence of,  Toussaint Battley NP. I have scribed the following portions of the note - Other sections scribed: HPI, ROS, PE.       History     Chief Complaint   Patient presents with    Otalgia     pt c/o R ear pain x 1 day     63 y.o female presents to the ED with right ear pain and swelling that started today. She is unsure if she was bit by an insect. She has been applying heat to the area.     The history is provided by the patient. No  was used.   Otalgia   This is a new problem. The current episode started today. There is pain in the right ear. The problem has been unchanged. Pertinent negatives include no ear discharge, no headaches, no hearing loss, no rhinorrhea, no sore throat, no abdominal pain, no diarrhea, no vomiting, no neck pain, no cough and no rash.     Review of patient's allergies indicates:  No Known Allergies  Past Medical History:   Diagnosis Date    Asthma     Emphysema of lung     Hypertension      Past Surgical History:   Procedure Laterality Date     SECTION      CHOLECYSTECTOMY      HYSTERECTOMY      CAPRICE     Family History   Problem Relation Age of Onset    Hypertension Mother      Social History     Tobacco Use    Smoking status: Current Every Day Smoker     Types: Cigarettes     Last attempt to quit: 3/5/2018     Years since quittin.3    Smokeless tobacco: Never Used   Substance Use Topics    Alcohol use: No     Alcohol/week: 0.0 oz    Drug use: No     Review of Systems   HENT: Positive for ear pain (right) and facial swelling (under the right ear). Negative for ear discharge, hearing loss, rhinorrhea and sore throat.    Respiratory: Negative for cough.    Gastrointestinal: Negative for abdominal pain, diarrhea and vomiting.   Musculoskeletal: Negative for neck pain.   Skin: Negative for rash.   Neurological: Negative for headaches.   All other systems  reviewed and are negative.      Physical Exam     Initial Vitals [06/22/19 1901]   BP Pulse Resp Temp SpO2   (!) 183/85 (!) 112 18 98.4 °F (36.9 °C) 97 %      MAP       --         Physical Exam    Nursing note and vitals reviewed.  Constitutional: She appears well-developed and well-nourished. She is not diaphoretic. No distress.   HENT:   Head: Normocephalic and atraumatic.       Right Ear: Tympanic membrane and ear canal normal. Tympanic membrane is not erythematous and not bulging. No middle ear effusion.   Eyes: EOM are normal.   Neck: Normal range of motion.   Pulmonary/Chest: No respiratory distress.   Abdominal: Soft.   Musculoskeletal: Normal range of motion.   Neurological: She is alert and oriented to person, place, and time. No cranial nerve deficit or sensory deficit.   Skin: Skin is warm and dry. Capillary refill takes less than 2 seconds.   Psychiatric: She has a normal mood and affect. Her behavior is normal.         ED Course   Procedures  Labs Reviewed - No data to display       Imaging Results    None                     Scribe Attestation:   Scribe #1: I performed the above scribed service and the documentation accurately describes the services I performed. I attest to the accuracy of the note.    This document was produced by a scribe under my direction and in my presence. I agree with the content of the note and have made any necessary edits.     Sherman Sharp MD    06/23/2019 6:34 AM           Clinical Impression:     1. Cauliflower ear, right                                   Sherman Sharp MD  06/23/19 0658

## 2019-07-15 ENCOUNTER — TELEPHONE (OUTPATIENT)
Dept: FAMILY MEDICINE | Facility: CLINIC | Age: 64
End: 2019-07-15

## 2019-08-23 DIAGNOSIS — Z12.11 COLON CANCER SCREENING: ICD-10-CM

## 2019-10-01 ENCOUNTER — OFFICE VISIT (OUTPATIENT)
Dept: FAMILY MEDICINE | Facility: CLINIC | Age: 64
End: 2019-10-01
Payer: MEDICARE

## 2019-10-01 VITALS
BODY MASS INDEX: 39.78 KG/M2 | TEMPERATURE: 98 F | DIASTOLIC BLOOD PRESSURE: 72 MMHG | RESPIRATION RATE: 17 BRPM | WEIGHT: 202.63 LBS | HEART RATE: 72 BPM | SYSTOLIC BLOOD PRESSURE: 144 MMHG | OXYGEN SATURATION: 96 % | HEIGHT: 60 IN

## 2019-10-01 DIAGNOSIS — J42 CHRONIC BRONCHITIS, UNSPECIFIED CHRONIC BRONCHITIS TYPE: ICD-10-CM

## 2019-10-01 DIAGNOSIS — F17.200 TOBACCO DEPENDENCE: ICD-10-CM

## 2019-10-01 DIAGNOSIS — I70.0 AORTIC ATHEROSCLEROSIS: ICD-10-CM

## 2019-10-01 DIAGNOSIS — J44.9 CHRONIC OBSTRUCTIVE PULMONARY DISEASE, UNSPECIFIED COPD TYPE: ICD-10-CM

## 2019-10-01 DIAGNOSIS — Z71.89 ADVANCE CARE PLANNING: ICD-10-CM

## 2019-10-01 DIAGNOSIS — I10 ESSENTIAL HYPERTENSION: Primary | ICD-10-CM

## 2019-10-01 DIAGNOSIS — E66.01 MORBID OBESITY: ICD-10-CM

## 2019-10-01 DIAGNOSIS — J41.8 MIXED SIMPLE AND MUCOPURULENT CHRONIC BRONCHITIS: ICD-10-CM

## 2019-10-01 DIAGNOSIS — Z23 NEED FOR INFLUENZA VACCINATION: ICD-10-CM

## 2019-10-01 PROCEDURE — G0008 ADMIN INFLUENZA VIRUS VAC: HCPCS | Mod: S$GLB,,, | Performed by: INTERNAL MEDICINE

## 2019-10-01 PROCEDURE — 99215 PR OFFICE/OUTPT VISIT, EST, LEVL V, 40-54 MIN: ICD-10-PCS | Mod: 25,S$GLB,, | Performed by: INTERNAL MEDICINE

## 2019-10-01 PROCEDURE — 99999 PR PBB SHADOW E&M-EST. PATIENT-LVL IV: CPT | Mod: PBBFAC,,, | Performed by: INTERNAL MEDICINE

## 2019-10-01 PROCEDURE — 3078F PR MOST RECENT DIASTOLIC BLOOD PRESSURE < 80 MM HG: ICD-10-PCS | Mod: CPTII,S$GLB,, | Performed by: INTERNAL MEDICINE

## 2019-10-01 PROCEDURE — 3077F PR MOST RECENT SYSTOLIC BLOOD PRESSURE >= 140 MM HG: ICD-10-PCS | Mod: CPTII,S$GLB,, | Performed by: INTERNAL MEDICINE

## 2019-10-01 PROCEDURE — 99499 UNLISTED E&M SERVICE: CPT | Mod: S$GLB,,, | Performed by: INTERNAL MEDICINE

## 2019-10-01 PROCEDURE — 99499 RISK ADDL DX/OHS AUDIT: ICD-10-PCS | Mod: S$GLB,,, | Performed by: INTERNAL MEDICINE

## 2019-10-01 PROCEDURE — 90662 IIV NO PRSV INCREASED AG IM: CPT | Mod: S$GLB,,, | Performed by: INTERNAL MEDICINE

## 2019-10-01 PROCEDURE — 3077F SYST BP >= 140 MM HG: CPT | Mod: CPTII,S$GLB,, | Performed by: INTERNAL MEDICINE

## 2019-10-01 PROCEDURE — 99999 PR PBB SHADOW E&M-EST. PATIENT-LVL IV: ICD-10-PCS | Mod: PBBFAC,,, | Performed by: INTERNAL MEDICINE

## 2019-10-01 PROCEDURE — 90662 FLU VACCINE - HIGH DOSE (65+) PRESERVATIVE FREE IM: ICD-10-PCS | Mod: S$GLB,,, | Performed by: INTERNAL MEDICINE

## 2019-10-01 PROCEDURE — G0008 FLU VACCINE - HIGH DOSE (65+) PRESERVATIVE FREE IM: ICD-10-PCS | Mod: S$GLB,,, | Performed by: INTERNAL MEDICINE

## 2019-10-01 PROCEDURE — 3078F DIAST BP <80 MM HG: CPT | Mod: CPTII,S$GLB,, | Performed by: INTERNAL MEDICINE

## 2019-10-01 PROCEDURE — 99215 OFFICE O/P EST HI 40 MIN: CPT | Mod: 25,S$GLB,, | Performed by: INTERNAL MEDICINE

## 2019-10-01 PROCEDURE — 1101F PR PT FALLS ASSESS DOC 0-1 FALLS W/OUT INJ PAST YR: ICD-10-PCS | Mod: CPTII,S$GLB,, | Performed by: INTERNAL MEDICINE

## 2019-10-01 PROCEDURE — 1101F PT FALLS ASSESS-DOCD LE1/YR: CPT | Mod: CPTII,S$GLB,, | Performed by: INTERNAL MEDICINE

## 2019-10-01 RX ORDER — FLUTICASONE FUROATE AND VILANTEROL 100; 25 UG/1; UG/1
1 POWDER RESPIRATORY (INHALATION) DAILY
Qty: 14 EACH | Refills: 12 | Status: SHIPPED | OUTPATIENT
Start: 2019-10-01 | End: 2019-11-12

## 2019-10-01 NOTE — PROGRESS NOTES
"Subjective:       Patient ID: Chanel Esparza is a 64 y.o. female.    Chief Complaint: Establish Care    Establish care    HPI: 63 y/o w/ HTN morbid obesity COPD current tobacco dependence presents with sister to establish primary care. She has strong feelings about medication and medical testing. "I've made it 64 years, I don't want to get a bunch a tests now if they are not going to fix something". She lives with sister (another sister not present today) she is independent in all here ADL's. seh does use albuterol MDI 3-5x/day when exerting herself to improve breathing. No nebulizer treatment use in > 4 months. Last steroid use six months ago never intubated because of respiratory issues. Never had formal PFT testing that she can recall (none in our system). Denies nasal congestion no nighttime symptoms no snoring denies lower extremity swelling    Review of Systems   Constitutional: Negative for activity change, appetite change, fatigue, fever and unexpected weight change.   HENT: Negative for ear pain, rhinorrhea and sore throat.    Eyes: Negative for discharge and visual disturbance.   Respiratory: Positive for shortness of breath (WATTS chronic relie fwith MDI). Negative for chest tightness and wheezing.    Cardiovascular: Negative for chest pain, palpitations and leg swelling.   Gastrointestinal: Negative for abdominal pain, constipation and diarrhea.   Endocrine: Negative for cold intolerance and heat intolerance.   Genitourinary: Negative for dysuria and hematuria.   Musculoskeletal: Negative for joint swelling and neck stiffness.   Skin: Negative for rash.   Neurological: Negative for dizziness, syncope, weakness and headaches.   Psychiatric/Behavioral: Negative for suicidal ideas.       Objective:     Vitals:    10/01/19 1003 10/01/19 1042   BP: (!) 162/78 (!) 144/72   BP Location: Right arm    Patient Position: Sitting    Pulse: 87 72   Resp: 17    Temp: 97.9 °F (36.6 °C)    TempSrc: Oral    SpO2: 96%    Weight: " "91.9 kg (202 lb 9.6 oz)    Height: 5' (1.524 m)           Physical Exam   Constitutional: She is oriented to person, place, and time. She appears well-developed and well-nourished.   HENT:   Head: Normocephalic and atraumatic.   No oral lesions crowded posterior airway   Eyes: Conjunctivae are normal. No scleral icterus.   Neck: Normal range of motion.   Cardiovascular: Normal rate and regular rhythm. Exam reveals no gallop and no friction rub.   No murmur heard.  No LE edema   Pulmonary/Chest: Effort normal and breath sounds normal. She has no wheezes. She has no rales.   Abdominal: Soft. Bowel sounds are normal. There is no tenderness. There is no rebound and no guarding.   Musculoskeletal: Normal range of motion. She exhibits no edema or tenderness.   Neurological: She is alert and oriented to person, place, and time. No cranial nerve deficit.   Skin: Skin is warm and dry.   Psychiatric: She has a normal mood and affect.       Assessment and Plan   1. Essential hypertension  Above goal labs today for end organ damage   - CBC auto differential; Future  - Comprehensive metabolic panel; Future    2. Morbid obesity  The patient is asked to make an attempt to improve diet and exercise patterns to aid in medical management of this problem.      3. Tobacco dependence  She is adament about not trying to quit smoking she has made an effort to cut down discussed risk of progressing lung disease with continued smoking she is understanding of this but declines assistance to quit smoking with smoking cessation    4. Mixed simple and mucopurulent chronic bronchitis  Check PFT to objectively measure severity of lung disease continue LABA/ICS  - Complete PFT with bronchodilator; Future  - PULSE OXIMETRY WITH REST - PULM; Future    5. Aortic atherosclerosis  Fasting lipid. She will NOT take statin medication "it doesn't fix anything" discussed goal of statin therapy to decrease intravascular plaque development and ruptur, voices " "understanding not interested intaking further medication  - Lipid panel; Future    6. Chronic obstructive pulmonary disease, unspecified COPD type   As above  - PULSE OXIMETRY WITH REST - PULM; Future    7. Chronic bronchitis, unspecified chronic bronchitis type  As above  - fluticasone furoate-vilanterol (BREO ELLIPTA) 100-25 mcg/dose diskus inhaler; Inhale 1 puff into the lungs once daily. Controller  Dispense: 14 each; Refill: 12    8. Need for influenza vaccination  Flu vaccine today  - Influenza - High Dose (65+) (PF) (IM)    9. Advance care planning  Patient does not want invasive life prolonging interentions including no CPR or mechanical ventalation. If her heart were to stop she would like to "die naturally" AD completed and scanned today she will compleat HCPOA and return prior to her follow up appointment    > 40 minutes spent on counseling on her chronic conditions the effect on her overall function and end of life planning including interventions in event of cardiac arrest and what her wishes would be int his case.   "

## 2019-10-10 DIAGNOSIS — Z12.39 BREAST CANCER SCREENING: ICD-10-CM

## 2019-10-16 ENCOUNTER — HOSPITAL ENCOUNTER (OUTPATIENT)
Dept: RESPIRATORY THERAPY | Facility: HOSPITAL | Age: 64
Discharge: HOME | End: 2019-10-16
Attending: INTERNAL MEDICINE
Payer: MEDICARE

## 2019-10-16 VITALS — HEART RATE: 81 BPM | OXYGEN SATURATION: 97 % | RESPIRATION RATE: 18 BRPM

## 2019-10-16 DIAGNOSIS — J44.9 CHRONIC OBSTRUCTIVE PULMONARY DISEASE, UNSPECIFIED COPD TYPE: ICD-10-CM

## 2019-10-16 DIAGNOSIS — J41.8 MIXED SIMPLE AND MUCOPURULENT CHRONIC BRONCHITIS: ICD-10-CM

## 2019-10-16 PROCEDURE — 94060 EVALUATION OF WHEEZING: CPT | Mod: 26,,, | Performed by: INTERNAL MEDICINE

## 2019-10-16 PROCEDURE — 94760 N-INVAS EAR/PLS OXIMETRY 1: CPT

## 2019-10-16 PROCEDURE — 94060 PR EVAL OF BRONCHOSPASM: ICD-10-PCS | Mod: 26,,, | Performed by: INTERNAL MEDICINE

## 2019-10-16 PROCEDURE — 25000242 PHARM REV CODE 250 ALT 637 W/ HCPCS: Performed by: INTERNAL MEDICINE

## 2019-10-16 PROCEDURE — 94729 DIFFUSING CAPACITY: CPT | Mod: 26,,, | Performed by: INTERNAL MEDICINE

## 2019-10-16 PROCEDURE — 94727 GAS DIL/WSHOT DETER LNG VOL: CPT | Mod: 26,,, | Performed by: INTERNAL MEDICINE

## 2019-10-16 PROCEDURE — 94727 PR PULM FUNCTION TEST BY GAS: ICD-10-PCS | Mod: 26,,, | Performed by: INTERNAL MEDICINE

## 2019-10-16 PROCEDURE — 94729 PR C02/MEMBANE DIFFUSE CAPACITY: ICD-10-PCS | Mod: 26,,, | Performed by: INTERNAL MEDICINE

## 2019-10-16 RX ORDER — ALBUTEROL SULFATE 2.5 MG/.5ML
2.5 SOLUTION RESPIRATORY (INHALATION) ONCE
Status: COMPLETED | OUTPATIENT
Start: 2019-10-16 | End: 2019-10-16

## 2019-10-16 RX ADMIN — ALBUTEROL SULFATE 2.5 MG: 2.5 SOLUTION RESPIRATORY (INHALATION) at 12:10

## 2019-10-17 LAB
BRPFT: ABNORMAL
DLCO ADJ PRE: 10.07 ML/(MIN*MMHG) (ref 13.56–25.02)
DLCO SINGLE BREATH LLN: 13.56
DLCO SINGLE BREATH PRE REF: 52.2 %
DLCO SINGLE BREATH REF: 19.29
DLCOC SBVA LLN: 2.83
DLCOC SBVA PRE REF: 64.5 %
DLCOC SBVA REF: 4.55
DLCOC SINGLE BREATH LLN: 13.56
DLCOC SINGLE BREATH PRE REF: 52.2 %
DLCOC SINGLE BREATH REF: 19.29
DLCOVA LLN: 2.83
DLCOVA PRE REF: 64.5 %
DLCOVA PRE: 2.93 ML/(MIN*MMHG*L) (ref 2.83–6.27)
DLCOVA REF: 4.55
DLVAADJ PRE: 2.93 ML/(MIN*MMHG*L) (ref 2.83–6.27)
ERVN2 LLN: 0.66
ERVN2 PRE REF: 198.9 %
ERVN2 PRE: 1.32 L (ref 0.66–0.66)
ERVN2 REF: 0.66
FEF 25 75 CHG: -3.2 %
FEF 25 75 LLN: 0.89
FEF 25 75 POST REF: 21.1 %
FEF 25 75 PRE REF: 21.8 %
FEF 25 75 REF: 1.84
FET100 CHG: 27.6 %
FEV1 CHG: 11.8 %
FEV1 FVC CHG: -3.8 %
FEV1 FVC LLN: 66
FEV1 FVC POST REF: 69.1 %
FEV1 FVC PRE REF: 71.8 %
FEV1 FVC REF: 79
FEV1 LLN: 1.49
FEV1 POST REF: 53.7 %
FEV1 PRE REF: 48.1 %
FEV1 REF: 2.02
FRCN2 LLN: 1.65
FRCN2 PRE REF: 112.9 %
FRCN2 REF: 2.47
FVC CHG: 16.2 %
FVC LLN: 1.9
FVC POST REF: 77.5 %
FVC PRE REF: 66.7 %
FVC REF: 2.56
IVC PRE: 1.61 L (ref 1.9–3.22)
IVC SINGLE BREATH LLN: 1.9
IVC SINGLE BREATH PRE REF: 63 %
IVC SINGLE BREATH REF: 2.56
PEF CHG: 6.9 %
PEF LLN: 3.82
PEF POST REF: 59 %
PEF PRE REF: 55.2 %
PEF REF: 5.31
POST FEF 25 75: 0.39 L/S (ref 0.89–2.79)
POST FET 100: 10.23 SEC
POST FEV1 FVC: 54.7 % (ref 66.34–92.03)
POST FEV1: 1.08 L (ref 1.49–2.54)
POST FVC: 1.98 L (ref 1.9–3.22)
POST PEF: 3.13 L/S (ref 3.82–6.81)
PRE DLCO: 10.07 ML/(MIN*MMHG) (ref 13.56–25.02)
PRE FEF 25 75: 0.4 L/S (ref 0.89–2.79)
PRE FET 100: 8.02 SEC
PRE FEV1 FVC: 56.87 % (ref 66.34–92.03)
PRE FEV1: 0.97 L (ref 1.49–2.54)
PRE FRC N2: 2.79 L
PRE FVC: 1.7 L (ref 1.9–3.22)
PRE PEF: 2.93 L/S (ref 3.82–6.81)
RVN2 LLN: 1.23
RVN2 PRE REF: 81.3 %
RVN2 PRE: 1.47 L (ref 1.23–2.38)
RVN2 REF: 1.81
RVN2TLCN2 LLN: 31.81
RVN2TLCN2 PRE REF: 93.4 %
RVN2TLCN2 PRE: 38.68 % (ref 31.81–50.99)
RVN2TLCN2 REF: 41.4
TLCN2 LLN: 3.25
TLCN2 PRE REF: 89.6 %
TLCN2 PRE: 3.8 L (ref 3.25–5.23)
TLCN2 REF: 4.24
VA PRE: 3.45 L (ref 4.09–4.09)
VA SINGLE BREATH LLN: 4.09
VA SINGLE BREATH PRE REF: 84.3 %
VA SINGLE BREATH REF: 4.09
VCMAXN2 LLN: 1.9
VCMAXN2 PRE REF: 91.1 %
VCMAXN2 PRE: 2.33 L (ref 1.9–3.22)
VCMAXN2 REF: 2.56

## 2019-11-12 ENCOUNTER — OFFICE VISIT (OUTPATIENT)
Dept: FAMILY MEDICINE | Facility: CLINIC | Age: 64
End: 2019-11-12
Payer: MEDICARE

## 2019-11-12 VITALS
OXYGEN SATURATION: 96 % | TEMPERATURE: 98 F | WEIGHT: 205.25 LBS | DIASTOLIC BLOOD PRESSURE: 84 MMHG | BODY MASS INDEX: 40.3 KG/M2 | SYSTOLIC BLOOD PRESSURE: 142 MMHG | HEART RATE: 88 BPM | RESPIRATION RATE: 17 BRPM | HEIGHT: 60 IN

## 2019-11-12 DIAGNOSIS — E66.01 MORBID OBESITY: ICD-10-CM

## 2019-11-12 DIAGNOSIS — J42 CHRONIC BRONCHITIS, UNSPECIFIED CHRONIC BRONCHITIS TYPE: Primary | ICD-10-CM

## 2019-11-12 PROCEDURE — 3079F PR MOST RECENT DIASTOLIC BLOOD PRESSURE 80-89 MM HG: ICD-10-PCS | Mod: CPTII,S$GLB,, | Performed by: INTERNAL MEDICINE

## 2019-11-12 PROCEDURE — 1101F PR PT FALLS ASSESS DOC 0-1 FALLS W/OUT INJ PAST YR: ICD-10-PCS | Mod: CPTII,S$GLB,, | Performed by: INTERNAL MEDICINE

## 2019-11-12 PROCEDURE — 3077F PR MOST RECENT SYSTOLIC BLOOD PRESSURE >= 140 MM HG: ICD-10-PCS | Mod: CPTII,S$GLB,, | Performed by: INTERNAL MEDICINE

## 2019-11-12 PROCEDURE — 99999 PR PBB SHADOW E&M-EST. PATIENT-LVL IV: ICD-10-PCS | Mod: PBBFAC,,, | Performed by: INTERNAL MEDICINE

## 2019-11-12 PROCEDURE — 99214 PR OFFICE/OUTPT VISIT, EST, LEVL IV, 30-39 MIN: ICD-10-PCS | Mod: S$GLB,,, | Performed by: INTERNAL MEDICINE

## 2019-11-12 PROCEDURE — 1101F PT FALLS ASSESS-DOCD LE1/YR: CPT | Mod: CPTII,S$GLB,, | Performed by: INTERNAL MEDICINE

## 2019-11-12 PROCEDURE — 3079F DIAST BP 80-89 MM HG: CPT | Mod: CPTII,S$GLB,, | Performed by: INTERNAL MEDICINE

## 2019-11-12 PROCEDURE — 99214 OFFICE O/P EST MOD 30 MIN: CPT | Mod: S$GLB,,, | Performed by: INTERNAL MEDICINE

## 2019-11-12 PROCEDURE — 3077F SYST BP >= 140 MM HG: CPT | Mod: CPTII,S$GLB,, | Performed by: INTERNAL MEDICINE

## 2019-11-12 PROCEDURE — 99999 PR PBB SHADOW E&M-EST. PATIENT-LVL IV: CPT | Mod: PBBFAC,,, | Performed by: INTERNAL MEDICINE

## 2019-11-12 NOTE — PROGRESS NOTES
Subjective:       Patient ID: Chanel Esparza is a 64 y.o. female.    Chief Complaint: Establish Care and Follow-up    F/u PFT;s    HPI: 65 y/o w/ HTN morbid obesity COPD presents for follow up. Since last visit had PFT's showing severe obstruction. Still using breo feels it is less effective then albuterol MDI using albuterol at least once daily no LE swelling. Unchanged cough minimal sputum production continues to smoke not interested int rying to quit. Weight is unchanged. Brother in law with whom she lives recently admitted to hospice care    Review of Systems   Constitutional: Negative for activity change, appetite change, fatigue, fever and unexpected weight change.   HENT: Negative for ear pain, rhinorrhea and sore throat.    Eyes: Negative for discharge and visual disturbance.   Respiratory: Positive for cough and shortness of breath. Negative for chest tightness and wheezing.    Cardiovascular: Negative for chest pain, palpitations and leg swelling.   Gastrointestinal: Negative for abdominal pain, constipation and diarrhea.   Endocrine: Negative for cold intolerance and heat intolerance.   Genitourinary: Negative for dysuria and hematuria.   Musculoskeletal: Negative for joint swelling and neck stiffness.   Skin: Negative for rash.   Neurological: Negative for dizziness, syncope, weakness and headaches.   Psychiatric/Behavioral: Negative for suicidal ideas.       Objective:     Vitals:    11/12/19 1021 11/12/19 1041   BP: (!) 162/85 (!) 142/84   BP Location: Right arm    Patient Position: Sitting    Pulse: 89 88   Resp: 17    Temp: 97.8 °F (36.6 °C)    TempSrc: Oral    SpO2: 96%    Weight: 93.1 kg (205 lb 4 oz)    Height: 5' (1.524 m)           Physical Exam   Constitutional: She is oriented to person, place, and time. She appears well-developed and well-nourished.   HENT:   Head: Normocephalic and atraumatic.   Eyes: Conjunctivae are normal. No scleral icterus.   Neck: Normal range of motion.   Cardiovascular:  Normal rate and regular rhythm. Exam reveals no gallop and no friction rub.   No murmur heard.  Pulmonary/Chest: Effort normal and breath sounds normal. She has no wheezes. She has no rales.   Abdominal: Soft. Bowel sounds are normal. There is no tenderness. There is no rebound and no guarding.   Musculoskeletal: Normal range of motion. She exhibits no edema or tenderness.   Neurological: She is alert and oriented to person, place, and time. No cranial nerve deficit.   Skin: Skin is warm and dry.   Psychiatric: She has a normal mood and affect.       Assessment and Plan   1. Chronic bronchitis, unspecified chronic bronchitis type  Switch to trelegy for anticholernigc follow up two months  - fluticasone-umeclidin-vilanter (TRELEGY ELLIPTA) 100-62.5-25 mcg DsDv; Inhale 1 puff into the lungs once daily.  Dispense: 28 each; Refill: 3    2. Morbid obesity  The patient is asked to make an attempt to improve diet and exercise patterns to aid in medical management of this problem.

## 2020-01-13 ENCOUNTER — OFFICE VISIT (OUTPATIENT)
Dept: FAMILY MEDICINE | Facility: CLINIC | Age: 65
End: 2020-01-13
Payer: MEDICARE

## 2020-01-13 VITALS
OXYGEN SATURATION: 98 % | HEIGHT: 60 IN | DIASTOLIC BLOOD PRESSURE: 80 MMHG | RESPIRATION RATE: 18 BRPM | BODY MASS INDEX: 39.66 KG/M2 | HEART RATE: 79 BPM | SYSTOLIC BLOOD PRESSURE: 138 MMHG | WEIGHT: 202 LBS

## 2020-01-13 DIAGNOSIS — I10 HYPERTENSION, ESSENTIAL: Primary | ICD-10-CM

## 2020-01-13 DIAGNOSIS — E66.01 MORBID OBESITY: ICD-10-CM

## 2020-01-13 DIAGNOSIS — J42 CHRONIC BRONCHITIS, UNSPECIFIED CHRONIC BRONCHITIS TYPE: ICD-10-CM

## 2020-01-13 DIAGNOSIS — E78.5 HYPERLIPIDEMIA, UNSPECIFIED HYPERLIPIDEMIA TYPE: ICD-10-CM

## 2020-01-13 PROCEDURE — 3075F SYST BP GE 130 - 139MM HG: CPT | Mod: CPTII,S$GLB,, | Performed by: INTERNAL MEDICINE

## 2020-01-13 PROCEDURE — 3075F PR MOST RECENT SYSTOLIC BLOOD PRESS GE 130-139MM HG: ICD-10-PCS | Mod: CPTII,S$GLB,, | Performed by: INTERNAL MEDICINE

## 2020-01-13 PROCEDURE — 99999 PR PBB SHADOW E&M-EST. PATIENT-LVL III: ICD-10-PCS | Mod: PBBFAC,,, | Performed by: INTERNAL MEDICINE

## 2020-01-13 PROCEDURE — 1159F PR MEDICATION LIST DOCUMENTED IN MEDICAL RECORD: ICD-10-PCS | Mod: S$GLB,,, | Performed by: INTERNAL MEDICINE

## 2020-01-13 PROCEDURE — 1101F PT FALLS ASSESS-DOCD LE1/YR: CPT | Mod: CPTII,S$GLB,, | Performed by: INTERNAL MEDICINE

## 2020-01-13 PROCEDURE — 99499 UNLISTED E&M SERVICE: CPT | Mod: S$GLB,,, | Performed by: INTERNAL MEDICINE

## 2020-01-13 PROCEDURE — 3079F DIAST BP 80-89 MM HG: CPT | Mod: CPTII,S$GLB,, | Performed by: INTERNAL MEDICINE

## 2020-01-13 PROCEDURE — 99999 PR PBB SHADOW E&M-EST. PATIENT-LVL III: CPT | Mod: PBBFAC,,, | Performed by: INTERNAL MEDICINE

## 2020-01-13 PROCEDURE — 99214 OFFICE O/P EST MOD 30 MIN: CPT | Mod: S$GLB,,, | Performed by: INTERNAL MEDICINE

## 2020-01-13 PROCEDURE — 1126F PR PAIN SEVERITY QUANTIFIED, NO PAIN PRESENT: ICD-10-PCS | Mod: S$GLB,,, | Performed by: INTERNAL MEDICINE

## 2020-01-13 PROCEDURE — 3079F PR MOST RECENT DIASTOLIC BLOOD PRESSURE 80-89 MM HG: ICD-10-PCS | Mod: CPTII,S$GLB,, | Performed by: INTERNAL MEDICINE

## 2020-01-13 PROCEDURE — 1159F MED LIST DOCD IN RCRD: CPT | Mod: S$GLB,,, | Performed by: INTERNAL MEDICINE

## 2020-01-13 PROCEDURE — 1101F PR PT FALLS ASSESS DOC 0-1 FALLS W/OUT INJ PAST YR: ICD-10-PCS | Mod: CPTII,S$GLB,, | Performed by: INTERNAL MEDICINE

## 2020-01-13 PROCEDURE — 99214 PR OFFICE/OUTPT VISIT, EST, LEVL IV, 30-39 MIN: ICD-10-PCS | Mod: S$GLB,,, | Performed by: INTERNAL MEDICINE

## 2020-01-13 PROCEDURE — 99499 RISK ADDL DX/OHS AUDIT: ICD-10-PCS | Mod: S$GLB,,, | Performed by: INTERNAL MEDICINE

## 2020-01-13 PROCEDURE — 1126F AMNT PAIN NOTED NONE PRSNT: CPT | Mod: S$GLB,,, | Performed by: INTERNAL MEDICINE

## 2020-01-13 RX ORDER — ALBUTEROL SULFATE 90 UG/1
2 AEROSOL, METERED RESPIRATORY (INHALATION) EVERY 6 HOURS PRN
Qty: 1 EACH | Refills: 11 | Status: SHIPPED | OUTPATIENT
Start: 2020-01-13 | End: 2020-01-21 | Stop reason: CLARIF

## 2020-01-13 NOTE — PROGRESS NOTES
Subjective:       Patient ID: Chanel Esparza is a 64 y.o. female.    Chief Complaint: Follow-up (on new medication)    F/u breathing    HPI: 65 y/o current tobacco dependence COPD and HTN presents for follow up. Feels breathing improved with trelegy no wheezing using albuterol less frequently. Continues to smoke not interested in quiting. Refuses screening exams no LE swelling no orthopnea or PND    Review of Systems   Constitutional: Negative for activity change, appetite change, fatigue, fever and unexpected weight change.   HENT: Negative for ear pain, rhinorrhea and sore throat.    Eyes: Negative for discharge and visual disturbance.   Respiratory: Negative for chest tightness, shortness of breath and wheezing.    Cardiovascular: Negative for chest pain, palpitations and leg swelling.   Gastrointestinal: Negative for abdominal pain, constipation and diarrhea.   Endocrine: Negative for cold intolerance and heat intolerance.   Genitourinary: Negative for dysuria and hematuria.   Musculoskeletal: Negative for joint swelling and neck stiffness.   Skin: Negative for rash.   Neurological: Negative for dizziness, syncope, weakness and headaches.   Psychiatric/Behavioral: Negative for suicidal ideas.       Objective:     Vitals:    01/13/20 1028   BP: 138/80   BP Location: Right arm   Patient Position: Sitting   BP Method: Large (Manual)   Pulse: 79   Resp: 18   SpO2: 98%   Weight: 91.6 kg (202 lb)   Height: 5' (1.524 m)          Physical Exam   Constitutional: She is oriented to person, place, and time. She appears well-developed and well-nourished.   HENT:   Head: Normocephalic and atraumatic.   Eyes: Conjunctivae are normal. No scleral icterus.   Neck: Normal range of motion.   Cardiovascular: Normal rate and regular rhythm. Exam reveals no gallop and no friction rub.   No murmur heard.  Pulmonary/Chest: Effort normal and breath sounds normal. She has no wheezes. She has no rales.   Abdominal: Soft. Bowel sounds are  normal. There is no tenderness. There is no rebound and no guarding.   Musculoskeletal: Normal range of motion. She exhibits no edema or tenderness.   Neurological: She is alert and oriented to person, place, and time. No cranial nerve deficit.   Skin: Skin is warm and dry.   Psychiatric: She has a normal mood and affect.       Assessment and Plan   1. Morbid obesity  The patient is asked to make an attempt to improve diet and exercise patterns to aid in medical management of this problem.    2. Chronic bronchitis, unspecified chronic bronchitis type  Improved control continue trelegy again stressed need for complete tobacco cessation and risk of worsening her underlying lung disease she voices understanding but is not interested in assistance in trying to quit  - fluticasone-umeclidin-vilanter (TRELEGY ELLIPTA) 100-62.5-25 mcg DsDv; Inhale 1 puff into the lungs once daily.  Dispense: 28 each; Refill: 3  - CBC auto differential; Future  - Comprehensive metabolic panel; Future  - albuterol (PROVENTIL/VENTOLIN HFA) 90 mcg/actuation inhaler; Inhale 2 puffs into the lungs every 6 (six) hours as needed for Wheezing.  Dispense: 1 each; Refill: 11    3. Hypertension, essential  At goal labs prior to return    4. Hyperlipidemia, unspecified hyperlipidemia type  Fasting lipid prior to return  - Lipid panel; Future

## 2020-01-21 DIAGNOSIS — J42 CHRONIC BRONCHITIS, UNSPECIFIED CHRONIC BRONCHITIS TYPE: Primary | ICD-10-CM

## 2020-01-21 DIAGNOSIS — J42 CHRONIC BRONCHITIS, UNSPECIFIED CHRONIC BRONCHITIS TYPE: ICD-10-CM

## 2020-01-21 RX ORDER — ALBUTEROL SULFATE 90 UG/1
AEROSOL, METERED RESPIRATORY (INHALATION)
Qty: 42.5 G | Refills: 1 | Status: SHIPPED | OUTPATIENT
Start: 2020-01-21 | End: 2021-02-26 | Stop reason: SDUPTHER

## 2020-01-21 RX ORDER — ALBUTEROL SULFATE 90 UG/1
2 AEROSOL, METERED RESPIRATORY (INHALATION) EVERY 6 HOURS PRN
Qty: 18 G | Refills: 1 | Status: SHIPPED | OUTPATIENT
Start: 2020-01-21 | End: 2020-01-21

## 2020-05-04 ENCOUNTER — PATIENT OUTREACH (OUTPATIENT)
Dept: ADMINISTRATIVE | Facility: HOSPITAL | Age: 65
End: 2020-05-04

## 2020-05-04 DIAGNOSIS — I70.0 AORTIC ATHEROSCLEROSIS: ICD-10-CM

## 2020-05-04 DIAGNOSIS — J45.901 ACUTE EXACERBATION OF COPD WITH ASTHMA: ICD-10-CM

## 2020-05-04 DIAGNOSIS — I10 ESSENTIAL HYPERTENSION: ICD-10-CM

## 2020-05-04 DIAGNOSIS — J44.1 ACUTE EXACERBATION OF COPD WITH ASTHMA: ICD-10-CM

## 2020-05-04 RX ORDER — LISINOPRIL AND HYDROCHLOROTHIAZIDE 20; 25 MG/1; MG/1
1 TABLET ORAL DAILY
Qty: 90 TABLET | Refills: 2 | Status: SHIPPED | OUTPATIENT
Start: 2020-05-04 | End: 2020-07-31 | Stop reason: SDUPTHER

## 2020-05-04 NOTE — TELEPHONE ENCOUNTER
----- Message from Jacque Padron sent at 5/4/2020 11:00 AM CDT -----  Contact: pt  Type: RX Refill Request    Who Called: pt    Have you contacted your pharmacy:    Refill or New Rx:lisinopril-hydrochlorothiazide (PRINZIDE,ZESTORETIC) 20-25 mg Tab     RX Name and Strength:    How is the patient currently taking it? (ex. 1XDay):    Is this a 30 day or 90 day RX:    Preferred Pharmacy with phone number:  [id number] - [place] AT [address] [address]  Phone: [phone] Fax: [phone]        Local or Mail Order:    Ordering Provider:    Would the patient rather a call back or a response via [facility]? call    Best Call Back Number:[phone] (home)       Additional Information:

## 2020-07-21 ENCOUNTER — PATIENT OUTREACH (OUTPATIENT)
Dept: ADMINISTRATIVE | Facility: HOSPITAL | Age: 65
End: 2020-07-21

## 2020-07-31 DIAGNOSIS — J44.1 ACUTE EXACERBATION OF COPD WITH ASTHMA: ICD-10-CM

## 2020-07-31 DIAGNOSIS — I10 ESSENTIAL HYPERTENSION: ICD-10-CM

## 2020-07-31 DIAGNOSIS — I70.0 AORTIC ATHEROSCLEROSIS: ICD-10-CM

## 2020-07-31 DIAGNOSIS — J45.901 ACUTE EXACERBATION OF COPD WITH ASTHMA: ICD-10-CM

## 2020-07-31 RX ORDER — LISINOPRIL AND HYDROCHLOROTHIAZIDE 20; 25 MG/1; MG/1
1 TABLET ORAL DAILY
Qty: 30 TABLET | Refills: 0 | Status: SHIPPED | OUTPATIENT
Start: 2020-07-31 | End: 2020-08-31

## 2020-08-19 ENCOUNTER — LAB VISIT (OUTPATIENT)
Dept: LAB | Facility: HOSPITAL | Age: 65
End: 2020-08-19
Attending: INTERNAL MEDICINE
Payer: MEDICARE

## 2020-08-19 DIAGNOSIS — E78.5 HYPERLIPIDEMIA, UNSPECIFIED HYPERLIPIDEMIA TYPE: ICD-10-CM

## 2020-08-19 DIAGNOSIS — J42 CHRONIC BRONCHITIS, UNSPECIFIED CHRONIC BRONCHITIS TYPE: ICD-10-CM

## 2020-08-19 LAB
ALBUMIN SERPL BCP-MCNC: 3.5 G/DL (ref 3.5–5.2)
ALP SERPL-CCNC: 54 U/L (ref 55–135)
ALT SERPL W/O P-5'-P-CCNC: 26 U/L (ref 10–44)
ANION GAP SERPL CALC-SCNC: 8 MMOL/L (ref 8–16)
AST SERPL-CCNC: 24 U/L (ref 10–40)
BASOPHILS # BLD AUTO: 0.05 K/UL (ref 0–0.2)
BASOPHILS NFR BLD: 0.6 % (ref 0–1.9)
BILIRUB SERPL-MCNC: 0.4 MG/DL (ref 0.1–1)
BUN SERPL-MCNC: 23 MG/DL (ref 8–23)
CALCIUM SERPL-MCNC: 9.6 MG/DL (ref 8.7–10.5)
CHLORIDE SERPL-SCNC: 106 MMOL/L (ref 95–110)
CHOLEST SERPL-MCNC: 196 MG/DL (ref 120–199)
CHOLEST/HDLC SERPL: 4.2 {RATIO} (ref 2–5)
CO2 SERPL-SCNC: 29 MMOL/L (ref 23–29)
CREAT SERPL-MCNC: 1.1 MG/DL (ref 0.5–1.4)
DIFFERENTIAL METHOD: ABNORMAL
EOSINOPHIL # BLD AUTO: 0.2 K/UL (ref 0–0.5)
EOSINOPHIL NFR BLD: 2.9 % (ref 0–8)
ERYTHROCYTE [DISTWIDTH] IN BLOOD BY AUTOMATED COUNT: 13.2 % (ref 11.5–14.5)
EST. GFR  (AFRICAN AMERICAN): >60 ML/MIN/1.73 M^2
EST. GFR  (NON AFRICAN AMERICAN): 52.4 ML/MIN/1.73 M^2
GLUCOSE SERPL-MCNC: 101 MG/DL (ref 70–110)
HCT VFR BLD AUTO: 49.8 % (ref 37–48.5)
HDLC SERPL-MCNC: 47 MG/DL (ref 40–75)
HDLC SERPL: 24 % (ref 20–50)
HGB BLD-MCNC: 15.4 G/DL (ref 12–16)
IMM GRANULOCYTES # BLD AUTO: 0.02 K/UL (ref 0–0.04)
IMM GRANULOCYTES NFR BLD AUTO: 0.3 % (ref 0–0.5)
LDLC SERPL CALC-MCNC: 136.4 MG/DL (ref 63–159)
LYMPHOCYTES # BLD AUTO: 1.6 K/UL (ref 1–4.8)
LYMPHOCYTES NFR BLD: 20.3 % (ref 18–48)
MCH RBC QN AUTO: 29.7 PG (ref 27–31)
MCHC RBC AUTO-ENTMCNC: 30.9 G/DL (ref 32–36)
MCV RBC AUTO: 96 FL (ref 82–98)
MONOCYTES # BLD AUTO: 0.8 K/UL (ref 0.3–1)
MONOCYTES NFR BLD: 10.2 % (ref 4–15)
NEUTROPHILS # BLD AUTO: 5.2 K/UL (ref 1.8–7.7)
NEUTROPHILS NFR BLD: 65.7 % (ref 38–73)
NONHDLC SERPL-MCNC: 149 MG/DL
NRBC BLD-RTO: 0 /100 WBC
PLATELET # BLD AUTO: 178 K/UL (ref 150–350)
PMV BLD AUTO: 12.9 FL (ref 9.2–12.9)
POTASSIUM SERPL-SCNC: 4.2 MMOL/L (ref 3.5–5.1)
PROT SERPL-MCNC: 7 G/DL (ref 6–8.4)
RBC # BLD AUTO: 5.19 M/UL (ref 4–5.4)
SODIUM SERPL-SCNC: 143 MMOL/L (ref 136–145)
TRIGL SERPL-MCNC: 63 MG/DL (ref 30–150)
WBC # BLD AUTO: 7.88 K/UL (ref 3.9–12.7)

## 2020-08-19 PROCEDURE — 80053 COMPREHEN METABOLIC PANEL: CPT

## 2020-08-19 PROCEDURE — 85025 COMPLETE CBC W/AUTO DIFF WBC: CPT

## 2020-08-19 PROCEDURE — 36415 COLL VENOUS BLD VENIPUNCTURE: CPT | Mod: PO

## 2020-08-19 PROCEDURE — 80061 LIPID PANEL: CPT

## 2020-08-31 DIAGNOSIS — I10 ESSENTIAL HYPERTENSION: ICD-10-CM

## 2020-08-31 DIAGNOSIS — J45.901 ACUTE EXACERBATION OF COPD WITH ASTHMA: ICD-10-CM

## 2020-08-31 DIAGNOSIS — I70.0 AORTIC ATHEROSCLEROSIS: ICD-10-CM

## 2020-08-31 DIAGNOSIS — J44.1 ACUTE EXACERBATION OF COPD WITH ASTHMA: ICD-10-CM

## 2020-08-31 RX ORDER — LISINOPRIL AND HYDROCHLOROTHIAZIDE 20; 25 MG/1; MG/1
1 TABLET ORAL DAILY
Qty: 90 TABLET | Refills: 0 | Status: SHIPPED | OUTPATIENT
Start: 2020-08-31 | End: 2020-12-03 | Stop reason: SDUPTHER

## 2020-10-09 ENCOUNTER — OFFICE VISIT (OUTPATIENT)
Dept: FAMILY MEDICINE | Facility: CLINIC | Age: 65
End: 2020-10-09
Payer: MEDICARE

## 2020-10-09 VITALS
BODY MASS INDEX: 38.48 KG/M2 | HEIGHT: 60 IN | SYSTOLIC BLOOD PRESSURE: 160 MMHG | OXYGEN SATURATION: 94 % | TEMPERATURE: 98 F | RESPIRATION RATE: 18 BRPM | HEART RATE: 76 BPM | WEIGHT: 196 LBS | DIASTOLIC BLOOD PRESSURE: 94 MMHG

## 2020-10-09 DIAGNOSIS — F17.200 TOBACCO DEPENDENCE: Primary | ICD-10-CM

## 2020-10-09 DIAGNOSIS — F43.29 GRIEF REACTION WITH PROLONGED BEREAVEMENT: ICD-10-CM

## 2020-10-09 DIAGNOSIS — E66.01 MORBID OBESITY: ICD-10-CM

## 2020-10-09 DIAGNOSIS — Z23 NEED FOR INFLUENZA VACCINATION: ICD-10-CM

## 2020-10-09 DIAGNOSIS — J42 CHRONIC BRONCHITIS, UNSPECIFIED CHRONIC BRONCHITIS TYPE: ICD-10-CM

## 2020-10-09 PROCEDURE — 99499 RISK ADDL DX/OHS AUDIT: ICD-10-PCS | Mod: S$GLB,,, | Performed by: INTERNAL MEDICINE

## 2020-10-09 PROCEDURE — 1101F PT FALLS ASSESS-DOCD LE1/YR: CPT | Mod: CPTII,S$GLB,, | Performed by: INTERNAL MEDICINE

## 2020-10-09 PROCEDURE — 3008F PR BODY MASS INDEX (BMI) DOCUMENTED: ICD-10-PCS | Mod: CPTII,S$GLB,, | Performed by: INTERNAL MEDICINE

## 2020-10-09 PROCEDURE — 1159F MED LIST DOCD IN RCRD: CPT | Mod: S$GLB,,, | Performed by: INTERNAL MEDICINE

## 2020-10-09 PROCEDURE — 3078F PR MOST RECENT DIASTOLIC BLOOD PRESSURE < 80 MM HG: ICD-10-PCS | Mod: CPTII,S$GLB,, | Performed by: INTERNAL MEDICINE

## 2020-10-09 PROCEDURE — 90694 FLU VACCINE - QUADRIVALENT - ADJUVANTED: ICD-10-PCS | Mod: S$GLB,,, | Performed by: INTERNAL MEDICINE

## 2020-10-09 PROCEDURE — 99999 PR PBB SHADOW E&M-EST. PATIENT-LVL V: CPT | Mod: PBBFAC,,, | Performed by: INTERNAL MEDICINE

## 2020-10-09 PROCEDURE — 90694 VACC AIIV4 NO PRSRV 0.5ML IM: CPT | Mod: S$GLB,,, | Performed by: INTERNAL MEDICINE

## 2020-10-09 PROCEDURE — 1159F PR MEDICATION LIST DOCUMENTED IN MEDICAL RECORD: ICD-10-PCS | Mod: S$GLB,,, | Performed by: INTERNAL MEDICINE

## 2020-10-09 PROCEDURE — 3008F BODY MASS INDEX DOCD: CPT | Mod: CPTII,S$GLB,, | Performed by: INTERNAL MEDICINE

## 2020-10-09 PROCEDURE — 1101F PR PT FALLS ASSESS DOC 0-1 FALLS W/OUT INJ PAST YR: ICD-10-PCS | Mod: CPTII,S$GLB,, | Performed by: INTERNAL MEDICINE

## 2020-10-09 PROCEDURE — 3077F PR MOST RECENT SYSTOLIC BLOOD PRESSURE >= 140 MM HG: ICD-10-PCS | Mod: CPTII,S$GLB,, | Performed by: INTERNAL MEDICINE

## 2020-10-09 PROCEDURE — 99999 PR PBB SHADOW E&M-EST. PATIENT-LVL V: ICD-10-PCS | Mod: PBBFAC,,, | Performed by: INTERNAL MEDICINE

## 2020-10-09 PROCEDURE — 99214 OFFICE O/P EST MOD 30 MIN: CPT | Mod: 25,S$GLB,, | Performed by: INTERNAL MEDICINE

## 2020-10-09 PROCEDURE — 3078F DIAST BP <80 MM HG: CPT | Mod: CPTII,S$GLB,, | Performed by: INTERNAL MEDICINE

## 2020-10-09 PROCEDURE — 99214 PR OFFICE/OUTPT VISIT, EST, LEVL IV, 30-39 MIN: ICD-10-PCS | Mod: 25,S$GLB,, | Performed by: INTERNAL MEDICINE

## 2020-10-09 PROCEDURE — 1126F AMNT PAIN NOTED NONE PRSNT: CPT | Mod: S$GLB,,, | Performed by: INTERNAL MEDICINE

## 2020-10-09 PROCEDURE — 99499 UNLISTED E&M SERVICE: CPT | Mod: S$GLB,,, | Performed by: INTERNAL MEDICINE

## 2020-10-09 PROCEDURE — G0008 FLU VACCINE - QUADRIVALENT - ADJUVANTED: ICD-10-PCS | Mod: S$GLB,,, | Performed by: INTERNAL MEDICINE

## 2020-10-09 PROCEDURE — 3077F SYST BP >= 140 MM HG: CPT | Mod: CPTII,S$GLB,, | Performed by: INTERNAL MEDICINE

## 2020-10-09 PROCEDURE — G0008 ADMIN INFLUENZA VIRUS VAC: HCPCS | Mod: S$GLB,,, | Performed by: INTERNAL MEDICINE

## 2020-10-09 PROCEDURE — 1126F PR PAIN SEVERITY QUANTIFIED, NO PAIN PRESENT: ICD-10-PCS | Mod: S$GLB,,, | Performed by: INTERNAL MEDICINE

## 2020-10-09 RX ORDER — HYDROXYZINE HYDROCHLORIDE 25 MG/1
25 TABLET, FILM COATED ORAL 3 TIMES DAILY PRN
Qty: 60 TABLET | Refills: 0 | Status: SHIPPED | OUTPATIENT
Start: 2020-10-09 | End: 2021-02-26 | Stop reason: SDUPTHER

## 2020-10-09 RX ORDER — MIRTAZAPINE 15 MG/1
15 TABLET, FILM COATED ORAL NIGHTLY
Qty: 30 TABLET | Refills: 1 | Status: SHIPPED | OUTPATIENT
Start: 2020-10-09 | End: 2021-07-21

## 2020-10-09 NOTE — PROGRESS NOTES
"Subjective:       Patient ID: Chanel Esparza is a 65 y.o. female.    Chief Complaint: Annual Exam, Flu Vaccine, and Results (labs)    Irritable    HPI: 66 y/o w/ HTN COPD current tobacco dependence presents for follow up and to discuss "short fuse". Her sister  after short illness onemonth ago. She have been feeling more depressed and tearful since then she is quick to yell at sister when she is trying gila elp her. Endorses difficulty falling and maintatin sleep     Review of Systems   Constitutional: Negative for activity change, appetite change, fatigue, fever and unexpected weight change.   HENT: Negative for ear pain, rhinorrhea and sore throat.    Eyes: Negative for discharge and visual disturbance.   Respiratory: Negative for chest tightness, shortness of breath and wheezing.    Cardiovascular: Negative for chest pain, palpitations and leg swelling.   Gastrointestinal: Negative for abdominal pain, constipation and diarrhea.   Endocrine: Negative for cold intolerance and heat intolerance.   Genitourinary: Negative for dysuria and hematuria.   Musculoskeletal: Negative for joint swelling and neck stiffness.   Skin: Negative for rash.   Neurological: Negative for dizziness, syncope, weakness and headaches.   Psychiatric/Behavioral: Positive for dysphoric mood and sleep disturbance. Negative for suicidal ideas. The patient is nervous/anxious.        Objective:     Vitals:    10/09/20 1320 10/09/20 1349   BP: (!) 156/85 (!) 160/94   BP Location: Right arm    Patient Position: Sitting    BP Method: Medium (Automatic)    Pulse: 89 76   Resp: 18    Temp: 98.3 °F (36.8 °C)    TempSrc: Oral    SpO2: (!) 94%    Weight: 88.9 kg (195 lb 15.8 oz)    Height: 5' (1.524 m)           Physical Exam  Constitutional:       General: She is not in acute distress.     Appearance: She is well-developed. She is obese.   HENT:      Head: Normocephalic and atraumatic.   Eyes:      General: No scleral icterus.     Conjunctiva/sclera: " Conjunctivae normal.   Neck:      Musculoskeletal: Normal range of motion.   Cardiovascular:      Rate and Rhythm: Normal rate and regular rhythm.      Heart sounds: No murmur. No friction rub. No gallop.    Pulmonary:      Effort: Pulmonary effort is normal.      Breath sounds: Normal breath sounds. No wheezing or rales.   Abdominal:      General: Bowel sounds are normal.      Palpations: Abdomen is soft.      Tenderness: There is no abdominal tenderness. There is no guarding or rebound.   Musculoskeletal: Normal range of motion.         General: No tenderness.      Right lower leg: No edema.      Left lower leg: No edema.   Skin:     General: Skin is warm and dry.   Neurological:      Mental Status: She is alert and oriented to person, place, and time.      Cranial Nerves: No cranial nerve deficit.   Psychiatric:      Comments: Tearful today especially when discussing her sister not responding to internal stimuli no psychomotor agitation          Assessment and Plan   1. Tobacco dependence  Encourage to quit smoking she is contemplative    2. Chronic bronchitis, unspecified chronic bronchitis type  Continue trelegy inhaler    3. Morbid obesity  The patient is asked to make an attempt to improve diet and exercise patterns to aid in medical management of this problem.    4. Grief reaction with prolonged bereavement  Prn atrax nightly mirtazapine follow up six weeks  - mirtazapine (REMERON) 15 MG tablet; Take 1 tablet (15 mg total) by mouth every evening.  Dispense: 30 tablet; Refill: 1  - hydrOXYzine HCL (ATARAX) 25 MG tablet; Take 1 tablet (25 mg total) by mouth 3 (three) times daily as needed for Anxiety.  Dispense: 60 tablet; Refill: 0    5. Need for influenza vaccination  Flu vaccine today  - Influenza (FLUAD) - Quadrivalent (Adjuvanted) *Preferred* (65+) (PF)

## 2020-11-06 DIAGNOSIS — J42 CHRONIC BRONCHITIS, UNSPECIFIED CHRONIC BRONCHITIS TYPE: ICD-10-CM

## 2020-12-03 DIAGNOSIS — J44.1 ACUTE EXACERBATION OF COPD WITH ASTHMA: ICD-10-CM

## 2020-12-03 DIAGNOSIS — J45.901 ACUTE EXACERBATION OF COPD WITH ASTHMA: ICD-10-CM

## 2020-12-03 DIAGNOSIS — I70.0 AORTIC ATHEROSCLEROSIS: ICD-10-CM

## 2020-12-03 DIAGNOSIS — I10 ESSENTIAL HYPERTENSION: ICD-10-CM

## 2020-12-03 RX ORDER — LISINOPRIL AND HYDROCHLOROTHIAZIDE 20; 25 MG/1; MG/1
1 TABLET ORAL DAILY
Qty: 90 TABLET | Refills: 0 | Status: SHIPPED | OUTPATIENT
Start: 2020-12-03 | End: 2021-02-26 | Stop reason: SDUPTHER

## 2021-01-29 ENCOUNTER — PATIENT MESSAGE (OUTPATIENT)
Dept: ADMINISTRATIVE | Facility: HOSPITAL | Age: 66
End: 2021-01-29

## 2021-02-26 ENCOUNTER — IMMUNIZATION (OUTPATIENT)
Dept: PHARMACY | Facility: CLINIC | Age: 66
End: 2021-02-26
Payer: MEDICARE

## 2021-02-26 ENCOUNTER — TELEPHONE (OUTPATIENT)
Dept: FAMILY MEDICINE | Facility: CLINIC | Age: 66
End: 2021-02-26

## 2021-02-26 DIAGNOSIS — J45.901 ACUTE EXACERBATION OF COPD WITH ASTHMA: ICD-10-CM

## 2021-02-26 DIAGNOSIS — I70.0 AORTIC ATHEROSCLEROSIS: ICD-10-CM

## 2021-02-26 DIAGNOSIS — Z23 NEED FOR VACCINATION: Primary | ICD-10-CM

## 2021-02-26 DIAGNOSIS — F43.29 GRIEF REACTION WITH PROLONGED BEREAVEMENT: ICD-10-CM

## 2021-02-26 DIAGNOSIS — J44.1 ACUTE EXACERBATION OF COPD WITH ASTHMA: ICD-10-CM

## 2021-02-26 DIAGNOSIS — I10 ESSENTIAL HYPERTENSION: ICD-10-CM

## 2021-02-26 DIAGNOSIS — J42 CHRONIC BRONCHITIS, UNSPECIFIED CHRONIC BRONCHITIS TYPE: ICD-10-CM

## 2021-02-26 RX ORDER — ALBUTEROL SULFATE 90 UG/1
2 AEROSOL, METERED RESPIRATORY (INHALATION) EVERY 6 HOURS PRN
Qty: 42.5 G | Refills: 1 | Status: SHIPPED | OUTPATIENT
Start: 2021-02-26 | End: 2022-06-07

## 2021-02-26 RX ORDER — HYDROXYZINE HYDROCHLORIDE 25 MG/1
25 TABLET, FILM COATED ORAL 3 TIMES DAILY PRN
Qty: 60 TABLET | Refills: 0 | Status: SHIPPED | OUTPATIENT
Start: 2021-02-26 | End: 2021-07-21 | Stop reason: ALTCHOICE

## 2021-02-26 RX ORDER — LISINOPRIL AND HYDROCHLOROTHIAZIDE 20; 25 MG/1; MG/1
1 TABLET ORAL DAILY
Qty: 90 TABLET | Refills: 0 | Status: SHIPPED | OUTPATIENT
Start: 2021-02-26 | End: 2021-05-31

## 2021-02-26 RX ORDER — IPRATROPIUM BROMIDE AND ALBUTEROL SULFATE 2.5; .5 MG/3ML; MG/3ML
3 SOLUTION RESPIRATORY (INHALATION) EVERY 6 HOURS PRN
Qty: 1 BOX | Refills: 6 | Status: SHIPPED | OUTPATIENT
Start: 2021-02-26 | End: 2023-07-03 | Stop reason: SDUPTHER

## 2021-03-26 ENCOUNTER — IMMUNIZATION (OUTPATIENT)
Dept: PHARMACY | Facility: CLINIC | Age: 66
End: 2021-03-26
Payer: MEDICARE

## 2021-03-26 DIAGNOSIS — Z23 NEED FOR VACCINATION: Primary | ICD-10-CM

## 2021-05-18 DIAGNOSIS — J42 CHRONIC BRONCHITIS, UNSPECIFIED CHRONIC BRONCHITIS TYPE: ICD-10-CM

## 2021-05-29 DIAGNOSIS — I10 ESSENTIAL HYPERTENSION: ICD-10-CM

## 2021-05-29 DIAGNOSIS — I70.0 AORTIC ATHEROSCLEROSIS: ICD-10-CM

## 2021-05-29 DIAGNOSIS — J44.1 ACUTE EXACERBATION OF COPD WITH ASTHMA: ICD-10-CM

## 2021-05-29 DIAGNOSIS — J45.901 ACUTE EXACERBATION OF COPD WITH ASTHMA: ICD-10-CM

## 2021-05-31 RX ORDER — LISINOPRIL AND HYDROCHLOROTHIAZIDE 20; 25 MG/1; MG/1
TABLET ORAL
Qty: 90 TABLET | Refills: 0 | Status: SHIPPED | OUTPATIENT
Start: 2021-05-31 | End: 2021-07-21 | Stop reason: SDUPTHER

## 2021-07-21 ENCOUNTER — OFFICE VISIT (OUTPATIENT)
Dept: FAMILY MEDICINE | Facility: CLINIC | Age: 66
End: 2021-07-21
Payer: MEDICARE

## 2021-07-21 ENCOUNTER — LAB VISIT (OUTPATIENT)
Dept: LAB | Facility: HOSPITAL | Age: 66
End: 2021-07-21
Attending: INTERNAL MEDICINE
Payer: MEDICARE

## 2021-07-21 VITALS
DIASTOLIC BLOOD PRESSURE: 68 MMHG | BODY MASS INDEX: 38.34 KG/M2 | HEIGHT: 60 IN | HEART RATE: 101 BPM | WEIGHT: 195.31 LBS | OXYGEN SATURATION: 95 % | SYSTOLIC BLOOD PRESSURE: 128 MMHG | TEMPERATURE: 98 F

## 2021-07-21 DIAGNOSIS — H25.13 NUCLEAR SCLEROSIS OF BOTH EYES: ICD-10-CM

## 2021-07-21 DIAGNOSIS — E66.01 MORBID OBESITY: ICD-10-CM

## 2021-07-21 DIAGNOSIS — J42 CHRONIC BRONCHITIS, UNSPECIFIED CHRONIC BRONCHITIS TYPE: ICD-10-CM

## 2021-07-21 DIAGNOSIS — N18.31 STAGE 3A CHRONIC KIDNEY DISEASE: ICD-10-CM

## 2021-07-21 DIAGNOSIS — I10 ESSENTIAL HYPERTENSION: Primary | ICD-10-CM

## 2021-07-21 DIAGNOSIS — I10 ESSENTIAL HYPERTENSION: ICD-10-CM

## 2021-07-21 LAB
ALBUMIN SERPL BCP-MCNC: 3.5 G/DL (ref 3.5–5.2)
ALP SERPL-CCNC: 63 U/L (ref 55–135)
ALT SERPL W/O P-5'-P-CCNC: 22 U/L (ref 10–44)
ANION GAP SERPL CALC-SCNC: 10 MMOL/L (ref 8–16)
AST SERPL-CCNC: 24 U/L (ref 10–40)
BASOPHILS # BLD AUTO: 0.07 K/UL (ref 0–0.2)
BASOPHILS NFR BLD: 0.7 % (ref 0–1.9)
BILIRUB SERPL-MCNC: 0.6 MG/DL (ref 0.1–1)
BUN SERPL-MCNC: 16 MG/DL (ref 8–23)
CALCIUM SERPL-MCNC: 10.2 MG/DL (ref 8.7–10.5)
CHLORIDE SERPL-SCNC: 102 MMOL/L (ref 95–110)
CO2 SERPL-SCNC: 29 MMOL/L (ref 23–29)
CREAT SERPL-MCNC: 1.1 MG/DL (ref 0.5–1.4)
DIFFERENTIAL METHOD: ABNORMAL
EOSINOPHIL # BLD AUTO: 0.3 K/UL (ref 0–0.5)
EOSINOPHIL NFR BLD: 2.7 % (ref 0–8)
ERYTHROCYTE [DISTWIDTH] IN BLOOD BY AUTOMATED COUNT: 12.8 % (ref 11.5–14.5)
EST. GFR  (AFRICAN AMERICAN): >60 ML/MIN/1.73 M^2
EST. GFR  (NON AFRICAN AMERICAN): 52 ML/MIN/1.73 M^2
GLUCOSE SERPL-MCNC: 102 MG/DL (ref 70–110)
HCT VFR BLD AUTO: 46.2 % (ref 37–48.5)
HGB BLD-MCNC: 15.7 G/DL (ref 12–16)
IMM GRANULOCYTES # BLD AUTO: 0.05 K/UL (ref 0–0.04)
IMM GRANULOCYTES NFR BLD AUTO: 0.5 % (ref 0–0.5)
LYMPHOCYTES # BLD AUTO: 2 K/UL (ref 1–4.8)
LYMPHOCYTES NFR BLD: 20.9 % (ref 18–48)
MCH RBC QN AUTO: 31 PG (ref 27–31)
MCHC RBC AUTO-ENTMCNC: 34 G/DL (ref 32–36)
MCV RBC AUTO: 91 FL (ref 82–98)
MONOCYTES # BLD AUTO: 1.1 K/UL (ref 0.3–1)
MONOCYTES NFR BLD: 11.4 % (ref 4–15)
NEUTROPHILS # BLD AUTO: 6.1 K/UL (ref 1.8–7.7)
NEUTROPHILS NFR BLD: 63.8 % (ref 38–73)
NRBC BLD-RTO: 0 /100 WBC
PLATELET # BLD AUTO: 216 K/UL (ref 150–450)
PMV BLD AUTO: 12 FL (ref 9.2–12.9)
POTASSIUM SERPL-SCNC: 3.5 MMOL/L (ref 3.5–5.1)
PROT SERPL-MCNC: 7.3 G/DL (ref 6–8.4)
RBC # BLD AUTO: 5.07 M/UL (ref 4–5.4)
SODIUM SERPL-SCNC: 141 MMOL/L (ref 136–145)
WBC # BLD AUTO: 9.55 K/UL (ref 3.9–12.7)

## 2021-07-21 PROCEDURE — 3074F PR MOST RECENT SYSTOLIC BLOOD PRESSURE < 130 MM HG: ICD-10-PCS | Mod: CPTII,S$GLB,, | Performed by: INTERNAL MEDICINE

## 2021-07-21 PROCEDURE — 85025 COMPLETE CBC W/AUTO DIFF WBC: CPT | Performed by: INTERNAL MEDICINE

## 2021-07-21 PROCEDURE — 1159F MED LIST DOCD IN RCRD: CPT | Mod: CPTII,S$GLB,, | Performed by: INTERNAL MEDICINE

## 2021-07-21 PROCEDURE — 99499 RISK ADDL DX/OHS AUDIT: ICD-10-PCS | Mod: S$GLB,,, | Performed by: INTERNAL MEDICINE

## 2021-07-21 PROCEDURE — 80053 COMPREHEN METABOLIC PANEL: CPT | Performed by: INTERNAL MEDICINE

## 2021-07-21 PROCEDURE — 1101F PR PT FALLS ASSESS DOC 0-1 FALLS W/OUT INJ PAST YR: ICD-10-PCS | Mod: CPTII,S$GLB,, | Performed by: INTERNAL MEDICINE

## 2021-07-21 PROCEDURE — 99214 OFFICE O/P EST MOD 30 MIN: CPT | Mod: S$GLB,,, | Performed by: INTERNAL MEDICINE

## 2021-07-21 PROCEDURE — 99999 PR PBB SHADOW E&M-EST. PATIENT-LVL IV: CPT | Mod: PBBFAC,,, | Performed by: INTERNAL MEDICINE

## 2021-07-21 PROCEDURE — 99999 PR PBB SHADOW E&M-EST. PATIENT-LVL IV: ICD-10-PCS | Mod: PBBFAC,,, | Performed by: INTERNAL MEDICINE

## 2021-07-21 PROCEDURE — 1101F PT FALLS ASSESS-DOCD LE1/YR: CPT | Mod: CPTII,S$GLB,, | Performed by: INTERNAL MEDICINE

## 2021-07-21 PROCEDURE — 3008F PR BODY MASS INDEX (BMI) DOCUMENTED: ICD-10-PCS | Mod: CPTII,S$GLB,, | Performed by: INTERNAL MEDICINE

## 2021-07-21 PROCEDURE — 3008F BODY MASS INDEX DOCD: CPT | Mod: CPTII,S$GLB,, | Performed by: INTERNAL MEDICINE

## 2021-07-21 PROCEDURE — 99214 PR OFFICE/OUTPT VISIT, EST, LEVL IV, 30-39 MIN: ICD-10-PCS | Mod: S$GLB,,, | Performed by: INTERNAL MEDICINE

## 2021-07-21 PROCEDURE — 1126F PR PAIN SEVERITY QUANTIFIED, NO PAIN PRESENT: ICD-10-PCS | Mod: CPTII,S$GLB,, | Performed by: INTERNAL MEDICINE

## 2021-07-21 PROCEDURE — 3078F DIAST BP <80 MM HG: CPT | Mod: CPTII,S$GLB,, | Performed by: INTERNAL MEDICINE

## 2021-07-21 PROCEDURE — 1159F PR MEDICATION LIST DOCUMENTED IN MEDICAL RECORD: ICD-10-PCS | Mod: CPTII,S$GLB,, | Performed by: INTERNAL MEDICINE

## 2021-07-21 PROCEDURE — 3074F SYST BP LT 130 MM HG: CPT | Mod: CPTII,S$GLB,, | Performed by: INTERNAL MEDICINE

## 2021-07-21 PROCEDURE — 3288F PR FALLS RISK ASSESSMENT DOCUMENTED: ICD-10-PCS | Mod: CPTII,S$GLB,, | Performed by: INTERNAL MEDICINE

## 2021-07-21 PROCEDURE — 3078F PR MOST RECENT DIASTOLIC BLOOD PRESSURE < 80 MM HG: ICD-10-PCS | Mod: CPTII,S$GLB,, | Performed by: INTERNAL MEDICINE

## 2021-07-21 PROCEDURE — 3288F FALL RISK ASSESSMENT DOCD: CPT | Mod: CPTII,S$GLB,, | Performed by: INTERNAL MEDICINE

## 2021-07-21 PROCEDURE — 99499 UNLISTED E&M SERVICE: CPT | Mod: S$GLB,,, | Performed by: INTERNAL MEDICINE

## 2021-07-21 PROCEDURE — 36415 COLL VENOUS BLD VENIPUNCTURE: CPT | Mod: PN | Performed by: INTERNAL MEDICINE

## 2021-07-21 PROCEDURE — 1126F AMNT PAIN NOTED NONE PRSNT: CPT | Mod: CPTII,S$GLB,, | Performed by: INTERNAL MEDICINE

## 2021-07-21 RX ORDER — LISINOPRIL AND HYDROCHLOROTHIAZIDE 20; 25 MG/1; MG/1
1 TABLET ORAL DAILY
Qty: 90 TABLET | Refills: 0 | Status: SHIPPED | OUTPATIENT
Start: 2021-07-21 | End: 2021-09-08

## 2021-08-09 ENCOUNTER — TELEPHONE (OUTPATIENT)
Dept: FAMILY MEDICINE | Facility: CLINIC | Age: 66
End: 2021-08-09

## 2021-08-10 ENCOUNTER — TELEPHONE (OUTPATIENT)
Dept: FAMILY MEDICINE | Facility: CLINIC | Age: 66
End: 2021-08-10

## 2021-08-11 ENCOUNTER — TELEPHONE (OUTPATIENT)
Dept: FAMILY MEDICINE | Facility: CLINIC | Age: 66
End: 2021-08-11

## 2021-08-24 ENCOUNTER — LAB VISIT (OUTPATIENT)
Dept: LAB | Facility: HOSPITAL | Age: 66
End: 2021-08-24
Attending: INTERNAL MEDICINE
Payer: MEDICARE

## 2021-08-24 DIAGNOSIS — I10 ESSENTIAL HYPERTENSION: ICD-10-CM

## 2021-08-24 LAB
ALBUMIN SERPL BCP-MCNC: 3.4 G/DL (ref 3.5–5.2)
ALP SERPL-CCNC: 59 U/L (ref 55–135)
ALT SERPL W/O P-5'-P-CCNC: 21 U/L (ref 10–44)
ANION GAP SERPL CALC-SCNC: 10 MMOL/L (ref 8–16)
AST SERPL-CCNC: 18 U/L (ref 10–40)
BASOPHILS # BLD AUTO: 0.04 K/UL (ref 0–0.2)
BASOPHILS NFR BLD: 0.5 % (ref 0–1.9)
BILIRUB SERPL-MCNC: 0.7 MG/DL (ref 0.1–1)
BUN SERPL-MCNC: 11 MG/DL (ref 8–23)
CALCIUM SERPL-MCNC: 9.7 MG/DL (ref 8.7–10.5)
CHLORIDE SERPL-SCNC: 102 MMOL/L (ref 95–110)
CO2 SERPL-SCNC: 26 MMOL/L (ref 23–29)
CREAT SERPL-MCNC: 0.8 MG/DL (ref 0.5–1.4)
DIFFERENTIAL METHOD: ABNORMAL
EOSINOPHIL # BLD AUTO: 0.3 K/UL (ref 0–0.5)
EOSINOPHIL NFR BLD: 3.1 % (ref 0–8)
ERYTHROCYTE [DISTWIDTH] IN BLOOD BY AUTOMATED COUNT: 13.2 % (ref 11.5–14.5)
EST. GFR  (AFRICAN AMERICAN): >60 ML/MIN/1.73 M^2
EST. GFR  (NON AFRICAN AMERICAN): >60 ML/MIN/1.73 M^2
GLUCOSE SERPL-MCNC: 103 MG/DL (ref 70–110)
HCT VFR BLD AUTO: 47 % (ref 37–48.5)
HGB BLD-MCNC: 15.4 G/DL (ref 12–16)
IMM GRANULOCYTES # BLD AUTO: 0.05 K/UL (ref 0–0.04)
IMM GRANULOCYTES NFR BLD AUTO: 0.6 % (ref 0–0.5)
LYMPHOCYTES # BLD AUTO: 1.5 K/UL (ref 1–4.8)
LYMPHOCYTES NFR BLD: 17.5 % (ref 18–48)
MCH RBC QN AUTO: 30.6 PG (ref 27–31)
MCHC RBC AUTO-ENTMCNC: 32.8 G/DL (ref 32–36)
MCV RBC AUTO: 93 FL (ref 82–98)
MONOCYTES # BLD AUTO: 0.8 K/UL (ref 0.3–1)
MONOCYTES NFR BLD: 9 % (ref 4–15)
NEUTROPHILS # BLD AUTO: 6 K/UL (ref 1.8–7.7)
NEUTROPHILS NFR BLD: 69.3 % (ref 38–73)
NRBC BLD-RTO: 0 /100 WBC
PLATELET # BLD AUTO: 175 K/UL (ref 150–450)
PMV BLD AUTO: 13 FL (ref 9.2–12.9)
POTASSIUM SERPL-SCNC: 3.7 MMOL/L (ref 3.5–5.1)
PROT SERPL-MCNC: 6.9 G/DL (ref 6–8.4)
RBC # BLD AUTO: 5.04 M/UL (ref 4–5.4)
SODIUM SERPL-SCNC: 138 MMOL/L (ref 136–145)
WBC # BLD AUTO: 8.59 K/UL (ref 3.9–12.7)

## 2021-08-24 PROCEDURE — 85025 COMPLETE CBC W/AUTO DIFF WBC: CPT | Performed by: INTERNAL MEDICINE

## 2021-08-24 PROCEDURE — 36415 COLL VENOUS BLD VENIPUNCTURE: CPT | Mod: PO | Performed by: INTERNAL MEDICINE

## 2021-08-24 PROCEDURE — 80053 COMPREHEN METABOLIC PANEL: CPT | Performed by: INTERNAL MEDICINE

## 2021-10-06 ENCOUNTER — OFFICE VISIT (OUTPATIENT)
Dept: FAMILY MEDICINE | Facility: CLINIC | Age: 66
End: 2021-10-06
Payer: MEDICARE

## 2021-10-06 VITALS
WEIGHT: 191.81 LBS | SYSTOLIC BLOOD PRESSURE: 132 MMHG | BODY MASS INDEX: 37.66 KG/M2 | HEART RATE: 90 BPM | HEIGHT: 60 IN | TEMPERATURE: 98 F | OXYGEN SATURATION: 96 % | DIASTOLIC BLOOD PRESSURE: 64 MMHG

## 2021-10-06 DIAGNOSIS — F17.200 TOBACCO DEPENDENCE: ICD-10-CM

## 2021-10-06 DIAGNOSIS — J42 CHRONIC BRONCHITIS, UNSPECIFIED CHRONIC BRONCHITIS TYPE: Primary | ICD-10-CM

## 2021-10-06 DIAGNOSIS — I10 HYPERTENSION, ESSENTIAL: ICD-10-CM

## 2021-10-06 PROCEDURE — 3078F DIAST BP <80 MM HG: CPT | Mod: CPTII,S$GLB,, | Performed by: INTERNAL MEDICINE

## 2021-10-06 PROCEDURE — 1159F PR MEDICATION LIST DOCUMENTED IN MEDICAL RECORD: ICD-10-PCS | Mod: CPTII,S$GLB,, | Performed by: INTERNAL MEDICINE

## 2021-10-06 PROCEDURE — 3075F PR MOST RECENT SYSTOLIC BLOOD PRESS GE 130-139MM HG: ICD-10-PCS | Mod: CPTII,S$GLB,, | Performed by: INTERNAL MEDICINE

## 2021-10-06 PROCEDURE — 99214 OFFICE O/P EST MOD 30 MIN: CPT | Mod: S$GLB,,, | Performed by: INTERNAL MEDICINE

## 2021-10-06 PROCEDURE — 1159F MED LIST DOCD IN RCRD: CPT | Mod: CPTII,S$GLB,, | Performed by: INTERNAL MEDICINE

## 2021-10-06 PROCEDURE — 99499 RISK ADDL DX/OHS AUDIT: ICD-10-PCS | Mod: S$GLB,,, | Performed by: INTERNAL MEDICINE

## 2021-10-06 PROCEDURE — 99214 PR OFFICE/OUTPT VISIT, EST, LEVL IV, 30-39 MIN: ICD-10-PCS | Mod: S$GLB,,, | Performed by: INTERNAL MEDICINE

## 2021-10-06 PROCEDURE — 1101F PT FALLS ASSESS-DOCD LE1/YR: CPT | Mod: CPTII,S$GLB,, | Performed by: INTERNAL MEDICINE

## 2021-10-06 PROCEDURE — 3288F FALL RISK ASSESSMENT DOCD: CPT | Mod: CPTII,S$GLB,, | Performed by: INTERNAL MEDICINE

## 2021-10-06 PROCEDURE — 4010F ACE/ARB THERAPY RXD/TAKEN: CPT | Mod: CPTII,S$GLB,, | Performed by: INTERNAL MEDICINE

## 2021-10-06 PROCEDURE — 1101F PR PT FALLS ASSESS DOC 0-1 FALLS W/OUT INJ PAST YR: ICD-10-PCS | Mod: CPTII,S$GLB,, | Performed by: INTERNAL MEDICINE

## 2021-10-06 PROCEDURE — 3008F PR BODY MASS INDEX (BMI) DOCUMENTED: ICD-10-PCS | Mod: CPTII,S$GLB,, | Performed by: INTERNAL MEDICINE

## 2021-10-06 PROCEDURE — 1160F PR REVIEW ALL MEDS BY PRESCRIBER/CLIN PHARMACIST DOCUMENTED: ICD-10-PCS | Mod: CPTII,S$GLB,, | Performed by: INTERNAL MEDICINE

## 2021-10-06 PROCEDURE — 3008F BODY MASS INDEX DOCD: CPT | Mod: CPTII,S$GLB,, | Performed by: INTERNAL MEDICINE

## 2021-10-06 PROCEDURE — 99999 PR PBB SHADOW E&M-EST. PATIENT-LVL IV: ICD-10-PCS | Mod: PBBFAC,,, | Performed by: INTERNAL MEDICINE

## 2021-10-06 PROCEDURE — 4010F PR ACE/ARB THEARPY RXD/TAKEN: ICD-10-PCS | Mod: CPTII,S$GLB,, | Performed by: INTERNAL MEDICINE

## 2021-10-06 PROCEDURE — 1126F AMNT PAIN NOTED NONE PRSNT: CPT | Mod: CPTII,S$GLB,, | Performed by: INTERNAL MEDICINE

## 2021-10-06 PROCEDURE — 1160F RVW MEDS BY RX/DR IN RCRD: CPT | Mod: CPTII,S$GLB,, | Performed by: INTERNAL MEDICINE

## 2021-10-06 PROCEDURE — 99999 PR PBB SHADOW E&M-EST. PATIENT-LVL IV: CPT | Mod: PBBFAC,,, | Performed by: INTERNAL MEDICINE

## 2021-10-06 PROCEDURE — 3075F SYST BP GE 130 - 139MM HG: CPT | Mod: CPTII,S$GLB,, | Performed by: INTERNAL MEDICINE

## 2021-10-06 PROCEDURE — 3288F PR FALLS RISK ASSESSMENT DOCUMENTED: ICD-10-PCS | Mod: CPTII,S$GLB,, | Performed by: INTERNAL MEDICINE

## 2021-10-06 PROCEDURE — 99499 UNLISTED E&M SERVICE: CPT | Mod: S$GLB,,, | Performed by: INTERNAL MEDICINE

## 2021-10-06 PROCEDURE — 3078F PR MOST RECENT DIASTOLIC BLOOD PRESSURE < 80 MM HG: ICD-10-PCS | Mod: CPTII,S$GLB,, | Performed by: INTERNAL MEDICINE

## 2021-10-06 PROCEDURE — 1126F PR PAIN SEVERITY QUANTIFIED, NO PAIN PRESENT: ICD-10-PCS | Mod: CPTII,S$GLB,, | Performed by: INTERNAL MEDICINE

## 2021-10-08 ENCOUNTER — PES CALL (OUTPATIENT)
Dept: ADMINISTRATIVE | Facility: CLINIC | Age: 66
End: 2021-10-08

## 2021-11-02 ENCOUNTER — CLINICAL SUPPORT (OUTPATIENT)
Dept: FAMILY MEDICINE | Facility: CLINIC | Age: 66
End: 2021-11-02
Payer: MEDICARE

## 2021-11-02 DIAGNOSIS — Z23 NEED FOR INFLUENZA VACCINATION: Primary | ICD-10-CM

## 2021-11-02 PROCEDURE — 99499 UNLISTED E&M SERVICE: CPT | Mod: S$GLB,,, | Performed by: INTERNAL MEDICINE

## 2021-11-02 PROCEDURE — G0008 FLU VACCINE - QUADRIVALENT - ADJUVANTED: ICD-10-PCS | Mod: S$GLB,,, | Performed by: INTERNAL MEDICINE

## 2021-11-02 PROCEDURE — G0008 ADMIN INFLUENZA VIRUS VAC: HCPCS | Mod: S$GLB,,, | Performed by: INTERNAL MEDICINE

## 2021-11-02 PROCEDURE — 90694 VACC AIIV4 NO PRSRV 0.5ML IM: CPT | Mod: S$GLB,,, | Performed by: INTERNAL MEDICINE

## 2021-11-02 PROCEDURE — 90694 FLU VACCINE - QUADRIVALENT - ADJUVANTED: ICD-10-PCS | Mod: S$GLB,,, | Performed by: INTERNAL MEDICINE

## 2021-11-02 PROCEDURE — 99499 NO LOS: ICD-10-PCS | Mod: S$GLB,,, | Performed by: INTERNAL MEDICINE

## 2021-11-10 ENCOUNTER — OFFICE VISIT (OUTPATIENT)
Dept: FAMILY MEDICINE | Facility: CLINIC | Age: 66
End: 2021-11-10
Payer: MEDICARE

## 2021-11-10 VITALS
HEIGHT: 60 IN | OXYGEN SATURATION: 97 % | BODY MASS INDEX: 38.03 KG/M2 | DIASTOLIC BLOOD PRESSURE: 68 MMHG | HEART RATE: 88 BPM | SYSTOLIC BLOOD PRESSURE: 122 MMHG | TEMPERATURE: 98 F | WEIGHT: 193.69 LBS

## 2021-11-10 DIAGNOSIS — I10 PRIMARY HYPERTENSION: ICD-10-CM

## 2021-11-10 DIAGNOSIS — J42 CHRONIC BRONCHITIS, UNSPECIFIED CHRONIC BRONCHITIS TYPE: ICD-10-CM

## 2021-11-10 DIAGNOSIS — H25.12 NUCLEAR SCLEROSIS OF LEFT EYE: Primary | ICD-10-CM

## 2021-11-10 PROCEDURE — 3078F PR MOST RECENT DIASTOLIC BLOOD PRESSURE < 80 MM HG: ICD-10-PCS | Mod: CPTII,S$GLB,, | Performed by: INTERNAL MEDICINE

## 2021-11-10 PROCEDURE — 4010F ACE/ARB THERAPY RXD/TAKEN: CPT | Mod: CPTII,S$GLB,, | Performed by: INTERNAL MEDICINE

## 2021-11-10 PROCEDURE — 1126F PR PAIN SEVERITY QUANTIFIED, NO PAIN PRESENT: ICD-10-PCS | Mod: CPTII,S$GLB,, | Performed by: INTERNAL MEDICINE

## 2021-11-10 PROCEDURE — 3008F BODY MASS INDEX DOCD: CPT | Mod: CPTII,S$GLB,, | Performed by: INTERNAL MEDICINE

## 2021-11-10 PROCEDURE — 99214 PR OFFICE/OUTPT VISIT, EST, LEVL IV, 30-39 MIN: ICD-10-PCS | Mod: S$GLB,,, | Performed by: INTERNAL MEDICINE

## 2021-11-10 PROCEDURE — 1160F PR REVIEW ALL MEDS BY PRESCRIBER/CLIN PHARMACIST DOCUMENTED: ICD-10-PCS | Mod: CPTII,S$GLB,, | Performed by: INTERNAL MEDICINE

## 2021-11-10 PROCEDURE — 4010F PR ACE/ARB THEARPY RXD/TAKEN: ICD-10-PCS | Mod: CPTII,S$GLB,, | Performed by: INTERNAL MEDICINE

## 2021-11-10 PROCEDURE — 3074F PR MOST RECENT SYSTOLIC BLOOD PRESSURE < 130 MM HG: ICD-10-PCS | Mod: CPTII,S$GLB,, | Performed by: INTERNAL MEDICINE

## 2021-11-10 PROCEDURE — 3288F PR FALLS RISK ASSESSMENT DOCUMENTED: ICD-10-PCS | Mod: CPTII,S$GLB,, | Performed by: INTERNAL MEDICINE

## 2021-11-10 PROCEDURE — 1160F RVW MEDS BY RX/DR IN RCRD: CPT | Mod: CPTII,S$GLB,, | Performed by: INTERNAL MEDICINE

## 2021-11-10 PROCEDURE — 3074F SYST BP LT 130 MM HG: CPT | Mod: CPTII,S$GLB,, | Performed by: INTERNAL MEDICINE

## 2021-11-10 PROCEDURE — 99999 PR PBB SHADOW E&M-EST. PATIENT-LVL IV: ICD-10-PCS | Mod: PBBFAC,,, | Performed by: INTERNAL MEDICINE

## 2021-11-10 PROCEDURE — 1159F MED LIST DOCD IN RCRD: CPT | Mod: CPTII,S$GLB,, | Performed by: INTERNAL MEDICINE

## 2021-11-10 PROCEDURE — 1101F PR PT FALLS ASSESS DOC 0-1 FALLS W/OUT INJ PAST YR: ICD-10-PCS | Mod: CPTII,S$GLB,, | Performed by: INTERNAL MEDICINE

## 2021-11-10 PROCEDURE — 1159F PR MEDICATION LIST DOCUMENTED IN MEDICAL RECORD: ICD-10-PCS | Mod: CPTII,S$GLB,, | Performed by: INTERNAL MEDICINE

## 2021-11-10 PROCEDURE — 1101F PT FALLS ASSESS-DOCD LE1/YR: CPT | Mod: CPTII,S$GLB,, | Performed by: INTERNAL MEDICINE

## 2021-11-10 PROCEDURE — 3288F FALL RISK ASSESSMENT DOCD: CPT | Mod: CPTII,S$GLB,, | Performed by: INTERNAL MEDICINE

## 2021-11-10 PROCEDURE — 99999 PR PBB SHADOW E&M-EST. PATIENT-LVL IV: CPT | Mod: PBBFAC,,, | Performed by: INTERNAL MEDICINE

## 2021-11-10 PROCEDURE — 3078F DIAST BP <80 MM HG: CPT | Mod: CPTII,S$GLB,, | Performed by: INTERNAL MEDICINE

## 2021-11-10 PROCEDURE — 3008F PR BODY MASS INDEX (BMI) DOCUMENTED: ICD-10-PCS | Mod: CPTII,S$GLB,, | Performed by: INTERNAL MEDICINE

## 2021-11-10 PROCEDURE — 1126F AMNT PAIN NOTED NONE PRSNT: CPT | Mod: CPTII,S$GLB,, | Performed by: INTERNAL MEDICINE

## 2021-11-10 PROCEDURE — 99214 OFFICE O/P EST MOD 30 MIN: CPT | Mod: S$GLB,,, | Performed by: INTERNAL MEDICINE

## 2021-12-18 DIAGNOSIS — J42 CHRONIC BRONCHITIS, UNSPECIFIED CHRONIC BRONCHITIS TYPE: ICD-10-CM

## 2021-12-20 ENCOUNTER — PES CALL (OUTPATIENT)
Dept: ADMINISTRATIVE | Facility: CLINIC | Age: 66
End: 2021-12-20
Payer: MEDICARE

## 2021-12-21 DIAGNOSIS — J42 CHRONIC BRONCHITIS, UNSPECIFIED CHRONIC BRONCHITIS TYPE: ICD-10-CM

## 2021-12-28 RX ORDER — FLUTICASONE FUROATE, UMECLIDINIUM BROMIDE AND VILANTEROL TRIFENATATE 100; 62.5; 25 UG/1; UG/1; UG/1
POWDER RESPIRATORY (INHALATION)
Qty: 60 EACH | Refills: 1 | OUTPATIENT
Start: 2021-12-28

## 2021-12-30 ENCOUNTER — LAB VISIT (OUTPATIENT)
Dept: LAB | Facility: HOSPITAL | Age: 66
End: 2021-12-30
Attending: INTERNAL MEDICINE
Payer: MEDICARE

## 2021-12-30 ENCOUNTER — TELEPHONE (OUTPATIENT)
Dept: FAMILY MEDICINE | Facility: CLINIC | Age: 66
End: 2021-12-30
Payer: MEDICARE

## 2021-12-30 DIAGNOSIS — I10 PRIMARY HYPERTENSION: ICD-10-CM

## 2021-12-30 LAB
ALBUMIN SERPL BCP-MCNC: 3.4 G/DL (ref 3.5–5.2)
ALP SERPL-CCNC: 59 U/L (ref 55–135)
ALT SERPL W/O P-5'-P-CCNC: 22 U/L (ref 10–44)
ANION GAP SERPL CALC-SCNC: 6 MMOL/L (ref 8–16)
AST SERPL-CCNC: 22 U/L (ref 10–40)
BASOPHILS # BLD AUTO: 0.04 K/UL (ref 0–0.2)
BASOPHILS NFR BLD: 0.5 % (ref 0–1.9)
BILIRUB SERPL-MCNC: 0.6 MG/DL (ref 0.1–1)
BUN SERPL-MCNC: 14 MG/DL (ref 8–23)
CALCIUM SERPL-MCNC: 9.5 MG/DL (ref 8.7–10.5)
CHLORIDE SERPL-SCNC: 101 MMOL/L (ref 95–110)
CO2 SERPL-SCNC: 28 MMOL/L (ref 23–29)
CREAT SERPL-MCNC: 0.9 MG/DL (ref 0.5–1.4)
DIFFERENTIAL METHOD: NORMAL
EOSINOPHIL # BLD AUTO: 0.3 K/UL (ref 0–0.5)
EOSINOPHIL NFR BLD: 3.9 % (ref 0–8)
ERYTHROCYTE [DISTWIDTH] IN BLOOD BY AUTOMATED COUNT: 12.8 % (ref 11.5–14.5)
EST. GFR  (AFRICAN AMERICAN): >60 ML/MIN/1.73 M^2
EST. GFR  (NON AFRICAN AMERICAN): >60 ML/MIN/1.73 M^2
GLUCOSE SERPL-MCNC: 103 MG/DL (ref 70–110)
HCT VFR BLD AUTO: 46.8 % (ref 37–48.5)
HGB BLD-MCNC: 15.3 G/DL (ref 12–16)
IMM GRANULOCYTES # BLD AUTO: 0.04 K/UL (ref 0–0.04)
IMM GRANULOCYTES NFR BLD AUTO: 0.5 % (ref 0–0.5)
LYMPHOCYTES # BLD AUTO: 1.9 K/UL (ref 1–4.8)
LYMPHOCYTES NFR BLD: 21.8 % (ref 18–48)
MCH RBC QN AUTO: 30.2 PG (ref 27–31)
MCHC RBC AUTO-ENTMCNC: 32.7 G/DL (ref 32–36)
MCV RBC AUTO: 92 FL (ref 82–98)
MONOCYTES # BLD AUTO: 0.9 K/UL (ref 0.3–1)
MONOCYTES NFR BLD: 10.2 % (ref 4–15)
NEUTROPHILS # BLD AUTO: 5.4 K/UL (ref 1.8–7.7)
NEUTROPHILS NFR BLD: 63.1 % (ref 38–73)
NRBC BLD-RTO: 0 /100 WBC
PLATELET # BLD AUTO: 212 K/UL (ref 150–450)
PMV BLD AUTO: 12.3 FL (ref 9.2–12.9)
POTASSIUM SERPL-SCNC: 4.1 MMOL/L (ref 3.5–5.1)
PROT SERPL-MCNC: 7.2 G/DL (ref 6–8.4)
RBC # BLD AUTO: 5.07 M/UL (ref 4–5.4)
SODIUM SERPL-SCNC: 135 MMOL/L (ref 136–145)
WBC # BLD AUTO: 8.54 K/UL (ref 3.9–12.7)

## 2021-12-30 PROCEDURE — 80053 COMPREHEN METABOLIC PANEL: CPT | Performed by: INTERNAL MEDICINE

## 2021-12-30 PROCEDURE — 85025 COMPLETE CBC W/AUTO DIFF WBC: CPT | Performed by: INTERNAL MEDICINE

## 2021-12-30 PROCEDURE — 36415 COLL VENOUS BLD VENIPUNCTURE: CPT | Mod: PO | Performed by: INTERNAL MEDICINE

## 2022-01-05 ENCOUNTER — PATIENT MESSAGE (OUTPATIENT)
Dept: FAMILY MEDICINE | Facility: CLINIC | Age: 67
End: 2022-01-05
Payer: MEDICARE

## 2022-01-06 ENCOUNTER — OFFICE VISIT (OUTPATIENT)
Dept: FAMILY MEDICINE | Facility: CLINIC | Age: 67
End: 2022-01-06
Payer: MEDICARE

## 2022-01-06 VITALS
OXYGEN SATURATION: 94 % | BODY MASS INDEX: 37.23 KG/M2 | WEIGHT: 189.63 LBS | HEIGHT: 60 IN | SYSTOLIC BLOOD PRESSURE: 122 MMHG | DIASTOLIC BLOOD PRESSURE: 62 MMHG | TEMPERATURE: 98 F | HEART RATE: 92 BPM

## 2022-01-06 DIAGNOSIS — F17.200 TOBACCO DEPENDENCE: ICD-10-CM

## 2022-01-06 DIAGNOSIS — E66.01 MORBID OBESITY: ICD-10-CM

## 2022-01-06 DIAGNOSIS — J42 CHRONIC BRONCHITIS, UNSPECIFIED CHRONIC BRONCHITIS TYPE: Primary | ICD-10-CM

## 2022-01-06 DIAGNOSIS — I10 ESSENTIAL HYPERTENSION: ICD-10-CM

## 2022-01-06 DIAGNOSIS — I70.0 ATHEROSCLEROSIS OF AORTA: ICD-10-CM

## 2022-01-06 PROCEDURE — 1101F PR PT FALLS ASSESS DOC 0-1 FALLS W/OUT INJ PAST YR: ICD-10-PCS | Mod: CPTII,S$GLB,, | Performed by: INTERNAL MEDICINE

## 2022-01-06 PROCEDURE — 99999 PR PBB SHADOW E&M-EST. PATIENT-LVL III: ICD-10-PCS | Mod: PBBFAC,,, | Performed by: INTERNAL MEDICINE

## 2022-01-06 PROCEDURE — 4010F PR ACE/ARB THEARPY RXD/TAKEN: ICD-10-PCS | Mod: CPTII,S$GLB,, | Performed by: INTERNAL MEDICINE

## 2022-01-06 PROCEDURE — 3008F PR BODY MASS INDEX (BMI) DOCUMENTED: ICD-10-PCS | Mod: CPTII,S$GLB,, | Performed by: INTERNAL MEDICINE

## 2022-01-06 PROCEDURE — 1160F RVW MEDS BY RX/DR IN RCRD: CPT | Mod: CPTII,S$GLB,, | Performed by: INTERNAL MEDICINE

## 2022-01-06 PROCEDURE — 3078F DIAST BP <80 MM HG: CPT | Mod: CPTII,S$GLB,, | Performed by: INTERNAL MEDICINE

## 2022-01-06 PROCEDURE — 3008F BODY MASS INDEX DOCD: CPT | Mod: CPTII,S$GLB,, | Performed by: INTERNAL MEDICINE

## 2022-01-06 PROCEDURE — 1159F MED LIST DOCD IN RCRD: CPT | Mod: CPTII,S$GLB,, | Performed by: INTERNAL MEDICINE

## 2022-01-06 PROCEDURE — 1160F PR REVIEW ALL MEDS BY PRESCRIBER/CLIN PHARMACIST DOCUMENTED: ICD-10-PCS | Mod: CPTII,S$GLB,, | Performed by: INTERNAL MEDICINE

## 2022-01-06 PROCEDURE — 1126F AMNT PAIN NOTED NONE PRSNT: CPT | Mod: CPTII,S$GLB,, | Performed by: INTERNAL MEDICINE

## 2022-01-06 PROCEDURE — 3074F SYST BP LT 130 MM HG: CPT | Mod: CPTII,S$GLB,, | Performed by: INTERNAL MEDICINE

## 2022-01-06 PROCEDURE — 99999 PR PBB SHADOW E&M-EST. PATIENT-LVL III: CPT | Mod: PBBFAC,,, | Performed by: INTERNAL MEDICINE

## 2022-01-06 PROCEDURE — 4010F ACE/ARB THERAPY RXD/TAKEN: CPT | Mod: CPTII,S$GLB,, | Performed by: INTERNAL MEDICINE

## 2022-01-06 PROCEDURE — 1159F PR MEDICATION LIST DOCUMENTED IN MEDICAL RECORD: ICD-10-PCS | Mod: CPTII,S$GLB,, | Performed by: INTERNAL MEDICINE

## 2022-01-06 PROCEDURE — 99214 OFFICE O/P EST MOD 30 MIN: CPT | Mod: S$GLB,,, | Performed by: INTERNAL MEDICINE

## 2022-01-06 PROCEDURE — 3288F PR FALLS RISK ASSESSMENT DOCUMENTED: ICD-10-PCS | Mod: CPTII,S$GLB,, | Performed by: INTERNAL MEDICINE

## 2022-01-06 PROCEDURE — 1101F PT FALLS ASSESS-DOCD LE1/YR: CPT | Mod: CPTII,S$GLB,, | Performed by: INTERNAL MEDICINE

## 2022-01-06 PROCEDURE — 99499 UNLISTED E&M SERVICE: CPT | Mod: S$GLB,,, | Performed by: INTERNAL MEDICINE

## 2022-01-06 PROCEDURE — 3074F PR MOST RECENT SYSTOLIC BLOOD PRESSURE < 130 MM HG: ICD-10-PCS | Mod: CPTII,S$GLB,, | Performed by: INTERNAL MEDICINE

## 2022-01-06 PROCEDURE — 3078F PR MOST RECENT DIASTOLIC BLOOD PRESSURE < 80 MM HG: ICD-10-PCS | Mod: CPTII,S$GLB,, | Performed by: INTERNAL MEDICINE

## 2022-01-06 PROCEDURE — 99499 RISK ADDL DX/OHS AUDIT: ICD-10-PCS | Mod: S$GLB,,, | Performed by: INTERNAL MEDICINE

## 2022-01-06 PROCEDURE — 1126F PR PAIN SEVERITY QUANTIFIED, NO PAIN PRESENT: ICD-10-PCS | Mod: CPTII,S$GLB,, | Performed by: INTERNAL MEDICINE

## 2022-01-06 PROCEDURE — 3288F FALL RISK ASSESSMENT DOCD: CPT | Mod: CPTII,S$GLB,, | Performed by: INTERNAL MEDICINE

## 2022-01-06 PROCEDURE — 99214 PR OFFICE/OUTPT VISIT, EST, LEVL IV, 30-39 MIN: ICD-10-PCS | Mod: S$GLB,,, | Performed by: INTERNAL MEDICINE

## 2022-01-06 RX ORDER — LISINOPRIL AND HYDROCHLOROTHIAZIDE 20; 25 MG/1; MG/1
1 TABLET ORAL DAILY
Qty: 90 TABLET | Refills: 1 | Status: SHIPPED | OUTPATIENT
Start: 2022-01-06 | End: 2022-06-14

## 2022-01-06 NOTE — PROGRESS NOTES
"Subjective:       Patient ID: Chanel Esparza is a 66 y.o. female.    Chief Complaint: Follow-up (3 month) and swollen gland (1 month, no pain)    F/u chronic conditions    HPI: 67 y/o w/ COPD tobacco dependence (not interested in quitting/smoking cessation assistance) obesity HTN presents along for scheduled follow up. Noted two months ago a "swollen gland" to right submandible area. No pain no overlying skin changes no dysphagia. She is rinsing mouth after using trelegy inhaler. No COPD exacerbation since last visit she is taking daily baby aspirin    Review of Systems   Constitutional: Negative for activity change, appetite change, fatigue, fever and unexpected weight change.   HENT: Negative for ear pain, rhinorrhea and sore throat.    Eyes: Negative for discharge and visual disturbance.   Respiratory: Negative for chest tightness, shortness of breath and wheezing.    Cardiovascular: Negative for chest pain, palpitations and leg swelling.   Gastrointestinal: Negative for abdominal pain, constipation and diarrhea.   Endocrine: Negative for cold intolerance and heat intolerance.   Genitourinary: Negative for dysuria and hematuria.   Musculoskeletal: Negative for joint swelling and neck stiffness.   Skin: Negative for rash.   Neurological: Negative for dizziness, syncope, weakness and headaches.   Psychiatric/Behavioral: Negative for suicidal ideas.       Objective:     Vitals:    01/06/22 0914   BP: 122/62   BP Location: Right arm   Patient Position: Sitting   BP Method: Large (Manual)   Pulse: 92   Temp: 97.8 °F (36.6 °C)   TempSrc: Oral   SpO2: (!) 94%   Weight: 86 kg (189 lb 9.5 oz)   Height: 5' (1.524 m)          Physical Exam  Constitutional:       General: She is not in acute distress.     Appearance: She is well-developed and well-nourished. She is obese.   HENT:      Head: Normocephalic and atraumatic.      Mouth/Throat:      Comments: Poor oral dentition with necroctic tooth along right lower jaw no pain with " manipulation of gum line  Eyes:      Conjunctiva/sclera: Conjunctivae normal.   Neck:      Comments: No appreciable adenopathy or evidence of enlarged salivary glands no overlying skin changes noted  Cardiovascular:      Rate and Rhythm: Normal rate and regular rhythm.      Heart sounds: No murmur heard.  No friction rub. No gallop.    Pulmonary:      Effort: Pulmonary effort is normal.      Breath sounds: Normal breath sounds. No wheezing or rales.   Musculoskeletal:         General: No tenderness or edema. Normal range of motion.      Cervical back: Normal range of motion. No rigidity.      Right lower leg: No edema.      Left lower leg: No edema.   Lymphadenopathy:      Cervical: No cervical adenopathy.   Skin:     General: Skin is warm and dry.   Neurological:      Mental Status: She is alert and oriented to person, place, and time.      Cranial Nerves: No cranial nerve deficit.   Psychiatric:         Mood and Affect: Mood and affect normal.         Assessment and Plan   1. Morbid obesity  The patient is asked to make an attempt to improve diet and exercise patterns to aid in medical management of this problem    2. Atherosclerosis of aorta  conitnue baby asprin BP at goal flp at next evaluation  - Lipid Panel; Future    3. Chronic bronchitis, unspecified chronic bronchitis type  Well controlled with current inhaler   - fluticasone-umeclidin-vilanter (TRELEGY ELLIPTA) 100-62.5-25 mcg DsDv; Inhale 1 puff into the lungs once daily.  Dispense: 60 each; Refill: 3    4. Tobacco dependence  Discussed importance of smoking cessation she voices understanding but not interested in trying to quit    5. Essential hypertension  bp at goal no evidence of end organ damage continue current medication  - lisinopriL-hydrochlorothiazide (PRINZIDE,ZESTORETIC) 20-25 mg Tab; Take 1 tablet by mouth once daily.  Dispense: 90 tablet; Refill: 1  - Comprehensive Metabolic Panel; Future

## 2022-03-21 ENCOUNTER — PATIENT MESSAGE (OUTPATIENT)
Dept: ADMINISTRATIVE | Facility: HOSPITAL | Age: 67
End: 2022-03-21
Payer: MEDICARE

## 2022-04-28 ENCOUNTER — LAB VISIT (OUTPATIENT)
Dept: LAB | Facility: HOSPITAL | Age: 67
End: 2022-04-28
Attending: INTERNAL MEDICINE
Payer: MEDICARE

## 2022-04-28 DIAGNOSIS — I10 ESSENTIAL HYPERTENSION: ICD-10-CM

## 2022-04-28 DIAGNOSIS — I70.0 ATHEROSCLEROSIS OF AORTA: ICD-10-CM

## 2022-04-28 LAB
ALBUMIN SERPL BCP-MCNC: 3.4 G/DL (ref 3.5–5.2)
ALP SERPL-CCNC: 55 U/L (ref 55–135)
ALT SERPL W/O P-5'-P-CCNC: 20 U/L (ref 10–44)
ANION GAP SERPL CALC-SCNC: 9 MMOL/L (ref 8–16)
AST SERPL-CCNC: 20 U/L (ref 10–40)
BILIRUB SERPL-MCNC: 0.6 MG/DL (ref 0.1–1)
BUN SERPL-MCNC: 16 MG/DL (ref 8–23)
CALCIUM SERPL-MCNC: 9.6 MG/DL (ref 8.7–10.5)
CHLORIDE SERPL-SCNC: 101 MMOL/L (ref 95–110)
CHOLEST SERPL-MCNC: 218 MG/DL (ref 120–199)
CHOLEST/HDLC SERPL: 4 {RATIO} (ref 2–5)
CO2 SERPL-SCNC: 29 MMOL/L (ref 23–29)
CREAT SERPL-MCNC: 1 MG/DL (ref 0.5–1.4)
EST. GFR  (AFRICAN AMERICAN): >60 ML/MIN/1.73 M^2
EST. GFR  (NON AFRICAN AMERICAN): 58 ML/MIN/1.73 M^2
GLUCOSE SERPL-MCNC: 103 MG/DL (ref 70–110)
HDLC SERPL-MCNC: 54 MG/DL (ref 40–75)
HDLC SERPL: 24.8 % (ref 20–50)
LDLC SERPL CALC-MCNC: 141.2 MG/DL (ref 63–159)
NONHDLC SERPL-MCNC: 164 MG/DL
POTASSIUM SERPL-SCNC: 3.8 MMOL/L (ref 3.5–5.1)
PROT SERPL-MCNC: 7 G/DL (ref 6–8.4)
SODIUM SERPL-SCNC: 139 MMOL/L (ref 136–145)
TRIGL SERPL-MCNC: 114 MG/DL (ref 30–150)

## 2022-04-28 PROCEDURE — 36415 COLL VENOUS BLD VENIPUNCTURE: CPT | Mod: PO | Performed by: INTERNAL MEDICINE

## 2022-04-28 PROCEDURE — 80053 COMPREHEN METABOLIC PANEL: CPT | Performed by: INTERNAL MEDICINE

## 2022-04-28 PROCEDURE — 80061 LIPID PANEL: CPT | Performed by: INTERNAL MEDICINE

## 2022-05-04 ENCOUNTER — PES CALL (OUTPATIENT)
Dept: ADMINISTRATIVE | Facility: CLINIC | Age: 67
End: 2022-05-04
Payer: MEDICARE

## 2022-05-10 ENCOUNTER — OFFICE VISIT (OUTPATIENT)
Dept: FAMILY MEDICINE | Facility: CLINIC | Age: 67
End: 2022-05-10
Payer: MEDICARE

## 2022-05-10 VITALS
HEIGHT: 60 IN | TEMPERATURE: 98 F | WEIGHT: 190.5 LBS | BODY MASS INDEX: 37.4 KG/M2 | HEART RATE: 64 BPM | OXYGEN SATURATION: 97 % | DIASTOLIC BLOOD PRESSURE: 62 MMHG | SYSTOLIC BLOOD PRESSURE: 134 MMHG

## 2022-05-10 DIAGNOSIS — E78.2 MIXED HYPERLIPIDEMIA: ICD-10-CM

## 2022-05-10 DIAGNOSIS — F17.200 TOBACCO DEPENDENCE: ICD-10-CM

## 2022-05-10 DIAGNOSIS — J42 CHRONIC BRONCHITIS, UNSPECIFIED CHRONIC BRONCHITIS TYPE: ICD-10-CM

## 2022-05-10 DIAGNOSIS — I70.0 ATHEROSCLEROSIS OF AORTA: ICD-10-CM

## 2022-05-10 DIAGNOSIS — I10 ESSENTIAL HYPERTENSION: Primary | ICD-10-CM

## 2022-05-10 PROCEDURE — 1159F MED LIST DOCD IN RCRD: CPT | Mod: CPTII,S$GLB,, | Performed by: INTERNAL MEDICINE

## 2022-05-10 PROCEDURE — 1160F PR REVIEW ALL MEDS BY PRESCRIBER/CLIN PHARMACIST DOCUMENTED: ICD-10-PCS | Mod: CPTII,S$GLB,, | Performed by: INTERNAL MEDICINE

## 2022-05-10 PROCEDURE — 99999 PR PBB SHADOW E&M-EST. PATIENT-LVL IV: CPT | Mod: PBBFAC,,, | Performed by: INTERNAL MEDICINE

## 2022-05-10 PROCEDURE — 1126F AMNT PAIN NOTED NONE PRSNT: CPT | Mod: CPTII,S$GLB,, | Performed by: INTERNAL MEDICINE

## 2022-05-10 PROCEDURE — 4010F ACE/ARB THERAPY RXD/TAKEN: CPT | Mod: CPTII,S$GLB,, | Performed by: INTERNAL MEDICINE

## 2022-05-10 PROCEDURE — 99214 PR OFFICE/OUTPT VISIT, EST, LEVL IV, 30-39 MIN: ICD-10-PCS | Mod: S$GLB,,, | Performed by: INTERNAL MEDICINE

## 2022-05-10 PROCEDURE — 3078F DIAST BP <80 MM HG: CPT | Mod: CPTII,S$GLB,, | Performed by: INTERNAL MEDICINE

## 2022-05-10 PROCEDURE — 3075F SYST BP GE 130 - 139MM HG: CPT | Mod: CPTII,S$GLB,, | Performed by: INTERNAL MEDICINE

## 2022-05-10 PROCEDURE — 1101F PR PT FALLS ASSESS DOC 0-1 FALLS W/OUT INJ PAST YR: ICD-10-PCS | Mod: CPTII,S$GLB,, | Performed by: INTERNAL MEDICINE

## 2022-05-10 PROCEDURE — 3008F BODY MASS INDEX DOCD: CPT | Mod: CPTII,S$GLB,, | Performed by: INTERNAL MEDICINE

## 2022-05-10 PROCEDURE — 1101F PT FALLS ASSESS-DOCD LE1/YR: CPT | Mod: CPTII,S$GLB,, | Performed by: INTERNAL MEDICINE

## 2022-05-10 PROCEDURE — 3075F PR MOST RECENT SYSTOLIC BLOOD PRESS GE 130-139MM HG: ICD-10-PCS | Mod: CPTII,S$GLB,, | Performed by: INTERNAL MEDICINE

## 2022-05-10 PROCEDURE — 99499 UNLISTED E&M SERVICE: CPT | Mod: S$GLB,,, | Performed by: INTERNAL MEDICINE

## 2022-05-10 PROCEDURE — 4010F PR ACE/ARB THEARPY RXD/TAKEN: ICD-10-PCS | Mod: CPTII,S$GLB,, | Performed by: INTERNAL MEDICINE

## 2022-05-10 PROCEDURE — 1159F PR MEDICATION LIST DOCUMENTED IN MEDICAL RECORD: ICD-10-PCS | Mod: CPTII,S$GLB,, | Performed by: INTERNAL MEDICINE

## 2022-05-10 PROCEDURE — 1126F PR PAIN SEVERITY QUANTIFIED, NO PAIN PRESENT: ICD-10-PCS | Mod: CPTII,S$GLB,, | Performed by: INTERNAL MEDICINE

## 2022-05-10 PROCEDURE — 99999 PR PBB SHADOW E&M-EST. PATIENT-LVL IV: ICD-10-PCS | Mod: PBBFAC,,, | Performed by: INTERNAL MEDICINE

## 2022-05-10 PROCEDURE — 3288F PR FALLS RISK ASSESSMENT DOCUMENTED: ICD-10-PCS | Mod: CPTII,S$GLB,, | Performed by: INTERNAL MEDICINE

## 2022-05-10 PROCEDURE — 3288F FALL RISK ASSESSMENT DOCD: CPT | Mod: CPTII,S$GLB,, | Performed by: INTERNAL MEDICINE

## 2022-05-10 PROCEDURE — 3078F PR MOST RECENT DIASTOLIC BLOOD PRESSURE < 80 MM HG: ICD-10-PCS | Mod: CPTII,S$GLB,, | Performed by: INTERNAL MEDICINE

## 2022-05-10 PROCEDURE — 3008F PR BODY MASS INDEX (BMI) DOCUMENTED: ICD-10-PCS | Mod: CPTII,S$GLB,, | Performed by: INTERNAL MEDICINE

## 2022-05-10 PROCEDURE — 99499 RISK ADDL DX/OHS AUDIT: ICD-10-PCS | Mod: S$GLB,,, | Performed by: INTERNAL MEDICINE

## 2022-05-10 PROCEDURE — 1160F RVW MEDS BY RX/DR IN RCRD: CPT | Mod: CPTII,S$GLB,, | Performed by: INTERNAL MEDICINE

## 2022-05-10 PROCEDURE — 99214 OFFICE O/P EST MOD 30 MIN: CPT | Mod: S$GLB,,, | Performed by: INTERNAL MEDICINE

## 2022-05-10 RX ORDER — ATORVASTATIN CALCIUM 40 MG/1
40 TABLET, FILM COATED ORAL DAILY
Qty: 90 TABLET | Refills: 3 | Status: SHIPPED | OUTPATIENT
Start: 2022-05-10 | End: 2023-05-31

## 2022-05-10 NOTE — PROGRESS NOTES
Subjective:       Patient ID: Chanel Esparza is a 66 y.o. female.    Chief Complaint: Follow-up (4 month)    F/u chronic conditions    HPI: 67 y/o w/ HTN COPD tobacco dependence presents alone for scheduled follow up. She has selected today as quit day for cigarettes! She otherwise feels well has been walking for exercise occasional end of day ankle swelling if standing or sitting for prolong periods. Breathing at base line using laba/ics daily. Planning trip to [place] with sister end of the month    Review of Systems   Constitutional: Negative for activity change, appetite change, fatigue, fever and unexpected weight change.   HENT: Negative for ear pain, rhinorrhea and sore throat.    Eyes: Negative for discharge and visual disturbance.   Respiratory: Negative for chest tightness, shortness of breath and wheezing.    Cardiovascular: Negative for chest pain, palpitations and leg swelling.   Gastrointestinal: Negative for abdominal pain, constipation and diarrhea.   Endocrine: Negative for cold intolerance and heat intolerance.   Genitourinary: Negative for dysuria and hematuria.   Musculoskeletal: Negative for joint swelling and neck stiffness.   Skin: Negative for rash.   Neurological: Negative for dizziness, syncope, weakness and headaches.   Psychiatric/Behavioral: Negative for suicidal ideas.       Objective:     Vitals:    05/10/22 0806 05/10/22 0827   BP: (!) 154/82 134/62   BP Location: Right arm    Patient Position: Sitting    BP Method: Large (Manual)    Pulse: 71 64   Temp: 97.8 °F (36.6 °C)    TempSrc: Oral    SpO2: 97%    Weight: 86.4 kg (190 lb 7.6 oz)    Height: 5' (1.524 m)           Physical Exam  Constitutional:       Appearance: She is well-developed.   HENT:      Head: Normocephalic and atraumatic.   Eyes:      General: No scleral icterus.     Conjunctiva/sclera: Conjunctivae normal.   Cardiovascular:      Rate and Rhythm: Normal rate and regular rhythm.      Heart sounds: No murmur heard.    No friction  rub. No gallop.   Pulmonary:      Effort: Pulmonary effort is normal.      Breath sounds: Normal breath sounds. No wheezing or rales.   Abdominal:      Palpations: Abdomen is soft.      Tenderness: There is no abdominal tenderness. There is no guarding or rebound.   Musculoskeletal:         General: No tenderness. Normal range of motion.      Cervical back: Normal range of motion.      Right lower leg: No edema.      Left lower leg: No edema.   Skin:     General: Skin is warm and dry.   Neurological:      Mental Status: She is alert and oriented to person, place, and time.      Cranial Nerves: No cranial nerve deficit.       The 10-year ASCVD risk score (Surajkev RICHEY Jr., et al., 2013) is: 19.2%    Values used to calculate the score:      Age: 66 years      Sex: Female      Is Non- : No      Diabetic: No      Tobacco smoker: Yes      Systolic Blood Pressure: 134 mmHg      Is BP treated: Yes      HDL Cholesterol: 54 mg/dL      Total Cholesterol: 218 mg/dL    Assessment and Plan   1. Essential hypertension  At goal continue acei and thiazide renal functiona dn electrolytes WNL  - Comprehensive Metabolic Panel; Future    2. Tobacco dependence  She is planning on quitting declines assistance with nicotine replacement or smoking cessation clinic    3. Chronic bronchitis, unspecified chronic bronchitis type  Stable continue inhalers    4. Atherosclerosis of aorta  Discussed reasoning behind statin to prevent CVD will start statin nightly  - atorvastatin (LIPITOR) 40 MG tablet; Take 1 tablet (40 mg total) by mouth once daily.  Dispense: 90 tablet; Refill: 3    5. Mixed hyperlipidemia  As above repeat lfts and lipid in three months  - atorvastatin (LIPITOR) 40 MG tablet; Take 1 tablet (40 mg total) by mouth once daily.  Dispense: 90 tablet; Refill: 3  - Comprehensive Metabolic Panel; Future  - Lipid Panel; Future

## 2022-05-12 ENCOUNTER — PATIENT MESSAGE (OUTPATIENT)
Dept: SMOKING CESSATION | Facility: CLINIC | Age: 67
End: 2022-05-12
Payer: MEDICARE

## 2022-05-12 DIAGNOSIS — Z12.11 COLON CANCER SCREENING: ICD-10-CM

## 2022-05-16 ENCOUNTER — PATIENT MESSAGE (OUTPATIENT)
Dept: ADMINISTRATIVE | Facility: HOSPITAL | Age: 67
End: 2022-05-16
Payer: MEDICARE

## 2022-06-07 DIAGNOSIS — J42 CHRONIC BRONCHITIS, UNSPECIFIED CHRONIC BRONCHITIS TYPE: ICD-10-CM

## 2022-06-07 RX ORDER — ALBUTEROL SULFATE 90 UG/1
AEROSOL, METERED RESPIRATORY (INHALATION)
Qty: 42.5 G | Refills: 1 | Status: SHIPPED | OUTPATIENT
Start: 2022-06-07 | End: 2023-07-17 | Stop reason: SDUPTHER

## 2022-06-07 NOTE — TELEPHONE ENCOUNTER
No new care gaps identified.  [facility] Embedded Care Gaps. Reference number: [id number]. 6/07/2022   10:29:03 AM SHAUNT

## 2022-06-07 NOTE — TELEPHONE ENCOUNTER
Refill Routing Note   Medication(s) are not appropriate for processing by [facility] for the following reason(s):      - Patient displays non-adherence to medications (has run out of medication supply over 6 months ago)    [facility] action(s):  Defer Medication-related problems identified: Non-adherence - intentional        Medication reconciliation completed: No     Appointments  past 12m or future 3m with PCP    Date Provider   Last Visit   5/10/2022 Warner Richey MD   Next Visit   9/13/2022 Warner Richey MD   ED visits in past 90 days: 0        Note composed:5:24 PM 06/07/2022

## 2022-06-13 DIAGNOSIS — I10 ESSENTIAL HYPERTENSION: ICD-10-CM

## 2022-06-14 RX ORDER — LISINOPRIL AND HYDROCHLOROTHIAZIDE 20; 25 MG/1; MG/1
TABLET ORAL
Qty: 90 TABLET | Refills: 3 | Status: SHIPPED | OUTPATIENT
Start: 2022-06-14 | End: 2022-09-13 | Stop reason: SDUPTHER

## 2022-06-14 NOTE — TELEPHONE ENCOUNTER
Refill Authorization Note   Chanel Esparza  is requesting a refill authorization.  Brief Assessment and Rationale for Refill:  Approve     Medication Therapy Plan:       Medication Reconciliation Completed: No   Comments:     No Care Gaps recommended.     Note composed:1:04 PM 06/14/2022

## 2022-06-14 NOTE — TELEPHONE ENCOUNTER
No new care gaps identified.  [facility] Embedded Care Gaps. Reference number: [id number]. 6/13/2022   10:25:45 PM CDT

## 2022-06-22 ENCOUNTER — PES CALL (OUTPATIENT)
Dept: ADMINISTRATIVE | Facility: CLINIC | Age: 67
End: 2022-06-22
Payer: MEDICARE

## 2022-08-18 ENCOUNTER — PATIENT OUTREACH (OUTPATIENT)
Dept: ADMINISTRATIVE | Facility: HOSPITAL | Age: 67
End: 2022-08-18
Payer: MEDICARE

## 2022-09-02 NOTE — TELEPHONE ENCOUNTER
Last Office Visit Info:   The patient's last visit with Warner Richey MD was on 1/13/2020.    The patient's last visit in current department was on Visit date not found.        Last CBC Results:   Lab Results   Component Value Date    WBC 9.25 10/01/2019    HGB 15.3 10/01/2019    HCT 47.6 10/01/2019     10/01/2019       Last CMP Results  Lab Results   Component Value Date     10/01/2019    K 4.4 10/01/2019     10/01/2019    CO2 30 (H) 10/01/2019    BUN 14 10/01/2019    CREATININE 1.0 10/01/2019    CALCIUM 9.8 10/01/2019    ALBUMIN 3.7 10/01/2019    AST 22 10/01/2019    ALT 24 10/01/2019       Last Lipids  Lab Results   Component Value Date    CHOL 209 (H) 10/01/2019    TRIG 138 10/01/2019    HDL 42 10/01/2019    LDLCALC 139.4 10/01/2019       Last A1C  No results found for: HGBA1C    Last TSH  Lab Results   Component Value Date    TSH 1.694 01/23/2018         Current Med Refills  Medication List with Changes/Refills   Current Medications    ALBUTEROL (PROVENTIL/VENTOLIN HFA) 90 MCG/ACTUATION INHALER    INHALE 2 PUFFS INTO THE LUNGS EVERY 6 HOURS AS NEEDED FOR WHEEZING. RESCUE       Start Date: 1/21/2020 End Date: --    ALBUTEROL-IPRATROPIUM (DUO-NEB) 2.5 MG-0.5 MG/3 ML NEBULIZER SOLUTION    Take 3 mLs by nebulization every 6 (six) hours as needed for Wheezing or Shortness of Breath. Rescue       Start Date: 3/22/2019 End Date: 3/21/2020    ASPIRIN (ECOTRIN) 81 MG EC TABLET    Take 81 mg by mouth once daily.       Start Date: --        End Date: --    CETIRIZINE HCL (ZYRTEC ORAL)    Take by mouth.       Start Date: --        End Date: --    LATANOPROST 0.005 % OPHTHALMIC SOLUTION    INT 1 GTT INTO OU HS       Start Date: 3/2/2019  End Date: --    LISINOPRIL-HYDROCHLOROTHIAZIDE (PRINZIDE,ZESTORETIC) 20-25 MG TAB    Take 1 tablet by mouth once daily.       Start Date: 5/4/2020  End Date: --    NYSTATIN (MYCOSTATIN) CREAM    Apply topically 2 (two) times daily.       Start Date: 6/12/2018 End Date:  --    TRELEGY ELLIPTA 100-62.5-25 MCG DSDV    INHALE 1 PUFF INTO THE LUNGS EVERY DAY       Start Date: 7/2/2020  End Date: --       Order(s) placed per written order guidelines:     Please advise.               (E4) spontaneous

## 2022-09-06 ENCOUNTER — LAB VISIT (OUTPATIENT)
Dept: LAB | Facility: HOSPITAL | Age: 67
End: 2022-09-06
Attending: INTERNAL MEDICINE
Payer: MEDICARE

## 2022-09-06 DIAGNOSIS — I10 ESSENTIAL HYPERTENSION: ICD-10-CM

## 2022-09-06 DIAGNOSIS — E78.2 MIXED HYPERLIPIDEMIA: ICD-10-CM

## 2022-09-06 LAB
ALBUMIN SERPL BCP-MCNC: 3.4 G/DL (ref 3.5–5.2)
ALP SERPL-CCNC: 64 U/L (ref 55–135)
ALT SERPL W/O P-5'-P-CCNC: 33 U/L (ref 10–44)
ANION GAP SERPL CALC-SCNC: 8 MMOL/L (ref 8–16)
AST SERPL-CCNC: 35 U/L (ref 10–40)
BILIRUB SERPL-MCNC: 0.4 MG/DL (ref 0.1–1)
BUN SERPL-MCNC: 16 MG/DL (ref 8–23)
CALCIUM SERPL-MCNC: 9.8 MG/DL (ref 8.7–10.5)
CHLORIDE SERPL-SCNC: 103 MMOL/L (ref 95–110)
CHOLEST SERPL-MCNC: 136 MG/DL (ref 120–199)
CHOLEST/HDLC SERPL: 2.4 {RATIO} (ref 2–5)
CO2 SERPL-SCNC: 29 MMOL/L (ref 23–29)
CREAT SERPL-MCNC: 1 MG/DL (ref 0.5–1.4)
EST. GFR  (NO RACE VARIABLE): >60 ML/MIN/1.73 M^2
GLUCOSE SERPL-MCNC: 108 MG/DL (ref 70–110)
HDLC SERPL-MCNC: 57 MG/DL (ref 40–75)
HDLC SERPL: 41.9 % (ref 20–50)
LDLC SERPL CALC-MCNC: 65.8 MG/DL (ref 63–159)
NONHDLC SERPL-MCNC: 79 MG/DL
POTASSIUM SERPL-SCNC: 4.1 MMOL/L (ref 3.5–5.1)
PROT SERPL-MCNC: 7.4 G/DL (ref 6–8.4)
SODIUM SERPL-SCNC: 140 MMOL/L (ref 136–145)
TRIGL SERPL-MCNC: 66 MG/DL (ref 30–150)

## 2022-09-06 PROCEDURE — 36415 COLL VENOUS BLD VENIPUNCTURE: CPT | Mod: PN | Performed by: INTERNAL MEDICINE

## 2022-09-06 PROCEDURE — 80053 COMPREHEN METABOLIC PANEL: CPT | Performed by: INTERNAL MEDICINE

## 2022-09-06 PROCEDURE — 80061 LIPID PANEL: CPT | Performed by: INTERNAL MEDICINE

## 2022-09-13 ENCOUNTER — OFFICE VISIT (OUTPATIENT)
Dept: FAMILY MEDICINE | Facility: CLINIC | Age: 67
End: 2022-09-13
Payer: MEDICARE

## 2022-09-13 VITALS
HEIGHT: 60 IN | HEART RATE: 88 BPM | BODY MASS INDEX: 37.53 KG/M2 | WEIGHT: 191.13 LBS | TEMPERATURE: 98 F | DIASTOLIC BLOOD PRESSURE: 74 MMHG | OXYGEN SATURATION: 96 % | SYSTOLIC BLOOD PRESSURE: 136 MMHG

## 2022-09-13 DIAGNOSIS — J42 CHRONIC BRONCHITIS, UNSPECIFIED CHRONIC BRONCHITIS TYPE: ICD-10-CM

## 2022-09-13 DIAGNOSIS — E78.2 MIXED HYPERLIPIDEMIA: ICD-10-CM

## 2022-09-13 DIAGNOSIS — F17.200 TOBACCO DEPENDENCE: ICD-10-CM

## 2022-09-13 DIAGNOSIS — Z23 NEED FOR INFLUENZA VACCINATION: ICD-10-CM

## 2022-09-13 DIAGNOSIS — I10 ESSENTIAL HYPERTENSION: Primary | ICD-10-CM

## 2022-09-13 PROCEDURE — 1159F PR MEDICATION LIST DOCUMENTED IN MEDICAL RECORD: ICD-10-PCS | Mod: CPTII,S$GLB,, | Performed by: INTERNAL MEDICINE

## 2022-09-13 PROCEDURE — 3008F PR BODY MASS INDEX (BMI) DOCUMENTED: ICD-10-PCS | Mod: CPTII,S$GLB,, | Performed by: INTERNAL MEDICINE

## 2022-09-13 PROCEDURE — 3075F PR MOST RECENT SYSTOLIC BLOOD PRESS GE 130-139MM HG: ICD-10-PCS | Mod: CPTII,S$GLB,, | Performed by: INTERNAL MEDICINE

## 2022-09-13 PROCEDURE — G0008 FLU VACCINE - QUADRIVALENT - ADJUVANTED: ICD-10-PCS | Mod: S$GLB,,, | Performed by: INTERNAL MEDICINE

## 2022-09-13 PROCEDURE — 99999 PR PBB SHADOW E&M-EST. PATIENT-LVL IV: CPT | Mod: PBBFAC,,, | Performed by: INTERNAL MEDICINE

## 2022-09-13 PROCEDURE — 3075F SYST BP GE 130 - 139MM HG: CPT | Mod: CPTII,S$GLB,, | Performed by: INTERNAL MEDICINE

## 2022-09-13 PROCEDURE — 90472 PR IMMUNIZ,ADMIN,EACH ADDL: ICD-10-PCS | Mod: S$GLB,,, | Performed by: INTERNAL MEDICINE

## 2022-09-13 PROCEDURE — 3078F PR MOST RECENT DIASTOLIC BLOOD PRESSURE < 80 MM HG: ICD-10-PCS | Mod: CPTII,S$GLB,, | Performed by: INTERNAL MEDICINE

## 2022-09-13 PROCEDURE — 4010F PR ACE/ARB THEARPY RXD/TAKEN: ICD-10-PCS | Mod: CPTII,S$GLB,, | Performed by: INTERNAL MEDICINE

## 2022-09-13 PROCEDURE — 90472 IMMUNIZATION ADMIN EACH ADD: CPT | Mod: S$GLB,,, | Performed by: INTERNAL MEDICINE

## 2022-09-13 PROCEDURE — 1101F PR PT FALLS ASSESS DOC 0-1 FALLS W/OUT INJ PAST YR: ICD-10-PCS | Mod: CPTII,S$GLB,, | Performed by: INTERNAL MEDICINE

## 2022-09-13 PROCEDURE — G0008 ADMIN INFLUENZA VIRUS VAC: HCPCS | Mod: S$GLB,,, | Performed by: INTERNAL MEDICINE

## 2022-09-13 PROCEDURE — 1101F PT FALLS ASSESS-DOCD LE1/YR: CPT | Mod: CPTII,S$GLB,, | Performed by: INTERNAL MEDICINE

## 2022-09-13 PROCEDURE — 3288F PR FALLS RISK ASSESSMENT DOCUMENTED: ICD-10-PCS | Mod: CPTII,S$GLB,, | Performed by: INTERNAL MEDICINE

## 2022-09-13 PROCEDURE — 90756 INFLUENZA VIRUS VACCINE, QUAD (CCIIV4), ANTIBIOTIC FREE, 0.5 ML: ICD-10-PCS | Mod: S$GLB,,, | Performed by: INTERNAL MEDICINE

## 2022-09-13 PROCEDURE — 3078F DIAST BP <80 MM HG: CPT | Mod: CPTII,S$GLB,, | Performed by: INTERNAL MEDICINE

## 2022-09-13 PROCEDURE — 1160F RVW MEDS BY RX/DR IN RCRD: CPT | Mod: CPTII,S$GLB,, | Performed by: INTERNAL MEDICINE

## 2022-09-13 PROCEDURE — 99999 PR PBB SHADOW E&M-EST. PATIENT-LVL IV: ICD-10-PCS | Mod: PBBFAC,,, | Performed by: INTERNAL MEDICINE

## 2022-09-13 PROCEDURE — 99214 OFFICE O/P EST MOD 30 MIN: CPT | Mod: 25,S$GLB,, | Performed by: INTERNAL MEDICINE

## 2022-09-13 PROCEDURE — 3288F FALL RISK ASSESSMENT DOCD: CPT | Mod: CPTII,S$GLB,, | Performed by: INTERNAL MEDICINE

## 2022-09-13 PROCEDURE — 4010F ACE/ARB THERAPY RXD/TAKEN: CPT | Mod: CPTII,S$GLB,, | Performed by: INTERNAL MEDICINE

## 2022-09-13 PROCEDURE — 3008F BODY MASS INDEX DOCD: CPT | Mod: CPTII,S$GLB,, | Performed by: INTERNAL MEDICINE

## 2022-09-13 PROCEDURE — 1126F AMNT PAIN NOTED NONE PRSNT: CPT | Mod: CPTII,S$GLB,, | Performed by: INTERNAL MEDICINE

## 2022-09-13 PROCEDURE — 1126F PR PAIN SEVERITY QUANTIFIED, NO PAIN PRESENT: ICD-10-PCS | Mod: CPTII,S$GLB,, | Performed by: INTERNAL MEDICINE

## 2022-09-13 PROCEDURE — 99214 PR OFFICE/OUTPT VISIT, EST, LEVL IV, 30-39 MIN: ICD-10-PCS | Mod: 25,S$GLB,, | Performed by: INTERNAL MEDICINE

## 2022-09-13 PROCEDURE — 1159F MED LIST DOCD IN RCRD: CPT | Mod: CPTII,S$GLB,, | Performed by: INTERNAL MEDICINE

## 2022-09-13 PROCEDURE — 90756 CCIIV4 VACC ABX FREE IM: CPT | Mod: S$GLB,,, | Performed by: INTERNAL MEDICINE

## 2022-09-13 PROCEDURE — 1160F PR REVIEW ALL MEDS BY PRESCRIBER/CLIN PHARMACIST DOCUMENTED: ICD-10-PCS | Mod: CPTII,S$GLB,, | Performed by: INTERNAL MEDICINE

## 2022-09-13 RX ORDER — LISINOPRIL AND HYDROCHLOROTHIAZIDE 20; 25 MG/1; MG/1
1 TABLET ORAL DAILY
Qty: 90 TABLET | Refills: 3 | Status: SHIPPED | OUTPATIENT
Start: 2022-09-13 | End: 2023-12-01

## 2022-09-13 NOTE — PROGRESS NOTES
Patient given flu vaccine to right deltoid, no complaints or reactions noted. Vis given 8/6/21 to patient.  Instructed to wait in lobby for 15 minutes to note for reactions, patient verbalized undertanding.

## 2022-09-13 NOTE — PROGRESS NOTES
Subjective:       Patient ID: Chanel Esparza is a 66 y.o. female.    Chief Complaint: Follow-up (4 months)    F/u chronic conditions    HPI: 67 y/o w/ COPD HTN tobacco dependence HLD presents for scheduled follow up. Feels well. She has been taking statin daily since our last visit iwthout adverse effect. Unfortunately she has resumed smoking feels it helps her nerves not interested in nicotine replacement therapy. No LE swelling using laba/ics daily     Review of Systems   Constitutional:  Negative for activity change, appetite change, fatigue, fever and unexpected weight change.   HENT:  Negative for ear pain, rhinorrhea and sore throat.    Eyes:  Negative for discharge and visual disturbance.   Respiratory:  Negative for chest tightness, shortness of breath and wheezing.    Cardiovascular:  Negative for chest pain, palpitations and leg swelling.   Gastrointestinal:  Negative for abdominal pain, constipation and diarrhea.   Endocrine: Negative for cold intolerance and heat intolerance.   Genitourinary:  Negative for dysuria and hematuria.   Musculoskeletal:  Negative for joint swelling and neck stiffness.   Skin:  Negative for rash.   Neurological:  Negative for dizziness, syncope, weakness and headaches.   Psychiatric/Behavioral:  Negative for suicidal ideas.      Objective:     Vitals:    09/13/22 0800 09/13/22 0815   BP: (!) 168/78 136/74   BP Location: Right arm    Patient Position: Sitting    BP Method: Large (Manual)    Pulse: 99 88   Temp: 97.9 °F (36.6 °C)    TempSrc: Oral    SpO2: 96%    Weight: 86.7 kg (191 lb 2.2 oz)    Height: 5' (1.524 m)           Physical Exam  Constitutional:       Appearance: She is well-developed. She is obese.   HENT:      Head: Normocephalic and atraumatic.   Eyes:      General: No scleral icterus.  Cardiovascular:      Rate and Rhythm: Normal rate and regular rhythm.      Heart sounds: No murmur heard.    No friction rub. No gallop.   Pulmonary:      Effort: Pulmonary effort is  normal.      Breath sounds: Normal breath sounds. No wheezing or rales.   Abdominal:      General: There is no distension.      Palpations: Abdomen is soft.      Tenderness: There is no abdominal tenderness. There is no guarding or rebound.   Musculoskeletal:         General: No tenderness. Normal range of motion.      Cervical back: Normal range of motion.      Right lower leg: No edema.      Left lower leg: No edema.   Skin:     General: Skin is warm and dry.   Neurological:      Mental Status: She is alert and oriented to person, place, and time.      Cranial Nerves: No cranial nerve deficit.       Assessment and Plan   1. Essential hypertension  Just at goal continue acei and thiaizde labs reviewed stable renal function normal potassium  - CBC Auto Differential; Future  - Basic Metabolic Panel; Future  - lisinopriL-hydrochlorothiazide (PRINZIDE,ZESTORETIC) 20-25 mg Tab; Take 1 tablet by mouth once daily.  Dispense: 90 tablet; Refill: 3    2. Chronic bronchitis, unspecified chronic bronchitis type  Laba/ics daily    3. Mixed hyperlipidemia  Ldl at goal continue statin    4. Tobacco dependence  Contemplative on quitting    5. Need for influenza vaccination  Flu vaccine today  - Influenza (FLUAD) - Quadrivalent (Adjuvanted) *Preferred* (65+) (PF)

## 2022-09-14 NOTE — PROGRESS NOTES
Addended by: CHIP ADAMS on: 9/14/2022 03:45 PM     Modules accepted: Orders     Patient reminded to return FitKit. Patient states she wants to skip it. Patient declined DEXA as well.

## 2022-11-04 ENCOUNTER — PATIENT OUTREACH (OUTPATIENT)
Dept: ADMINISTRATIVE | Facility: HOSPITAL | Age: 67
End: 2022-11-04
Payer: MEDICARE

## 2022-11-04 NOTE — PROGRESS NOTES
Pt declines Colonoscopy screening. Pt refuses to have thIs test done. PHN report updated. Immunization's updated.

## 2023-01-10 ENCOUNTER — LAB VISIT (OUTPATIENT)
Dept: LAB | Facility: HOSPITAL | Age: 68
End: 2023-01-10
Attending: INTERNAL MEDICINE
Payer: MEDICARE

## 2023-01-10 DIAGNOSIS — I10 ESSENTIAL HYPERTENSION: ICD-10-CM

## 2023-01-10 LAB
ANION GAP SERPL CALC-SCNC: 8 MMOL/L (ref 8–16)
BASOPHILS # BLD AUTO: 0.06 K/UL (ref 0–0.2)
BASOPHILS NFR BLD: 0.6 % (ref 0–1.9)
BUN SERPL-MCNC: 20 MG/DL (ref 8–23)
CALCIUM SERPL-MCNC: 10.4 MG/DL (ref 8.7–10.5)
CHLORIDE SERPL-SCNC: 102 MMOL/L (ref 95–110)
CO2 SERPL-SCNC: 29 MMOL/L (ref 23–29)
CREAT SERPL-MCNC: 0.9 MG/DL (ref 0.5–1.4)
DIFFERENTIAL METHOD: ABNORMAL
EOSINOPHIL # BLD AUTO: 0.3 K/UL (ref 0–0.5)
EOSINOPHIL NFR BLD: 2.8 % (ref 0–8)
ERYTHROCYTE [DISTWIDTH] IN BLOOD BY AUTOMATED COUNT: 13.4 % (ref 11.5–14.5)
EST. GFR  (NO RACE VARIABLE): >60 ML/MIN/1.73 M^2
GLUCOSE SERPL-MCNC: 105 MG/DL (ref 70–110)
HCT VFR BLD AUTO: 49.6 % (ref 37–48.5)
HGB BLD-MCNC: 15.4 G/DL (ref 12–16)
IMM GRANULOCYTES # BLD AUTO: 0.04 K/UL (ref 0–0.04)
IMM GRANULOCYTES NFR BLD AUTO: 0.4 % (ref 0–0.5)
LYMPHOCYTES # BLD AUTO: 2 K/UL (ref 1–4.8)
LYMPHOCYTES NFR BLD: 19.6 % (ref 18–48)
MCH RBC QN AUTO: 29.4 PG (ref 27–31)
MCHC RBC AUTO-ENTMCNC: 31 G/DL (ref 32–36)
MCV RBC AUTO: 95 FL (ref 82–98)
MONOCYTES # BLD AUTO: 0.9 K/UL (ref 0.3–1)
MONOCYTES NFR BLD: 9.1 % (ref 4–15)
NEUTROPHILS # BLD AUTO: 6.9 K/UL (ref 1.8–7.7)
NEUTROPHILS NFR BLD: 67.5 % (ref 38–73)
NRBC BLD-RTO: 0 /100 WBC
PLATELET # BLD AUTO: 226 K/UL (ref 150–450)
PMV BLD AUTO: 12.1 FL (ref 9.2–12.9)
POTASSIUM SERPL-SCNC: 4.5 MMOL/L (ref 3.5–5.1)
RBC # BLD AUTO: 5.24 M/UL (ref 4–5.4)
SODIUM SERPL-SCNC: 139 MMOL/L (ref 136–145)
WBC # BLD AUTO: 10.14 K/UL (ref 3.9–12.7)

## 2023-01-10 PROCEDURE — 80048 BASIC METABOLIC PNL TOTAL CA: CPT | Performed by: INTERNAL MEDICINE

## 2023-01-10 PROCEDURE — 85025 COMPLETE CBC W/AUTO DIFF WBC: CPT | Performed by: INTERNAL MEDICINE

## 2023-01-10 PROCEDURE — 36415 COLL VENOUS BLD VENIPUNCTURE: CPT | Mod: PO | Performed by: INTERNAL MEDICINE

## 2023-01-24 ENCOUNTER — OFFICE VISIT (OUTPATIENT)
Dept: FAMILY MEDICINE | Facility: CLINIC | Age: 68
End: 2023-01-24
Payer: MEDICARE

## 2023-01-24 VITALS
RESPIRATION RATE: 18 BRPM | TEMPERATURE: 98 F | DIASTOLIC BLOOD PRESSURE: 90 MMHG | HEIGHT: 60 IN | BODY MASS INDEX: 37.23 KG/M2 | OXYGEN SATURATION: 99 % | SYSTOLIC BLOOD PRESSURE: 123 MMHG | WEIGHT: 189.63 LBS | HEART RATE: 84 BPM

## 2023-01-24 DIAGNOSIS — F17.200 TOBACCO DEPENDENCE: ICD-10-CM

## 2023-01-24 DIAGNOSIS — J42 CHRONIC BRONCHITIS, UNSPECIFIED CHRONIC BRONCHITIS TYPE: Primary | ICD-10-CM

## 2023-01-24 DIAGNOSIS — E66.01 MORBID OBESITY: ICD-10-CM

## 2023-01-24 DIAGNOSIS — R73.01 IMPAIRED FASTING GLUCOSE: ICD-10-CM

## 2023-01-24 DIAGNOSIS — I10 HYPERTENSION, ESSENTIAL: ICD-10-CM

## 2023-01-24 DIAGNOSIS — I70.0 ATHEROSCLEROSIS OF AORTA: ICD-10-CM

## 2023-01-24 DIAGNOSIS — D69.2 OTHER NONTHROMBOCYTOPENIC PURPURA: ICD-10-CM

## 2023-01-24 PROCEDURE — 3288F PR FALLS RISK ASSESSMENT DOCUMENTED: ICD-10-PCS | Mod: CPTII,S$GLB,, | Performed by: INTERNAL MEDICINE

## 2023-01-24 PROCEDURE — 1101F PT FALLS ASSESS-DOCD LE1/YR: CPT | Mod: CPTII,S$GLB,, | Performed by: INTERNAL MEDICINE

## 2023-01-24 PROCEDURE — 1101F PR PT FALLS ASSESS DOC 0-1 FALLS W/OUT INJ PAST YR: ICD-10-PCS | Mod: CPTII,S$GLB,, | Performed by: INTERNAL MEDICINE

## 2023-01-24 PROCEDURE — 1160F RVW MEDS BY RX/DR IN RCRD: CPT | Mod: CPTII,S$GLB,, | Performed by: INTERNAL MEDICINE

## 2023-01-24 PROCEDURE — 1159F PR MEDICATION LIST DOCUMENTED IN MEDICAL RECORD: ICD-10-PCS | Mod: CPTII,S$GLB,, | Performed by: INTERNAL MEDICINE

## 2023-01-24 PROCEDURE — 99214 PR OFFICE/OUTPT VISIT, EST, LEVL IV, 30-39 MIN: ICD-10-PCS | Mod: S$GLB,,, | Performed by: INTERNAL MEDICINE

## 2023-01-24 PROCEDURE — 1126F AMNT PAIN NOTED NONE PRSNT: CPT | Mod: CPTII,S$GLB,, | Performed by: INTERNAL MEDICINE

## 2023-01-24 PROCEDURE — 1126F PR PAIN SEVERITY QUANTIFIED, NO PAIN PRESENT: ICD-10-PCS | Mod: CPTII,S$GLB,, | Performed by: INTERNAL MEDICINE

## 2023-01-24 PROCEDURE — 3074F SYST BP LT 130 MM HG: CPT | Mod: CPTII,S$GLB,, | Performed by: INTERNAL MEDICINE

## 2023-01-24 PROCEDURE — 3080F PR MOST RECENT DIASTOLIC BLOOD PRESSURE >= 90 MM HG: ICD-10-PCS | Mod: CPTII,S$GLB,, | Performed by: INTERNAL MEDICINE

## 2023-01-24 PROCEDURE — 3008F PR BODY MASS INDEX (BMI) DOCUMENTED: ICD-10-PCS | Mod: CPTII,S$GLB,, | Performed by: INTERNAL MEDICINE

## 2023-01-24 PROCEDURE — 3288F FALL RISK ASSESSMENT DOCD: CPT | Mod: CPTII,S$GLB,, | Performed by: INTERNAL MEDICINE

## 2023-01-24 PROCEDURE — 3080F DIAST BP >= 90 MM HG: CPT | Mod: CPTII,S$GLB,, | Performed by: INTERNAL MEDICINE

## 2023-01-24 PROCEDURE — 1159F MED LIST DOCD IN RCRD: CPT | Mod: CPTII,S$GLB,, | Performed by: INTERNAL MEDICINE

## 2023-01-24 PROCEDURE — 3074F PR MOST RECENT SYSTOLIC BLOOD PRESSURE < 130 MM HG: ICD-10-PCS | Mod: CPTII,S$GLB,, | Performed by: INTERNAL MEDICINE

## 2023-01-24 PROCEDURE — 1160F PR REVIEW ALL MEDS BY PRESCRIBER/CLIN PHARMACIST DOCUMENTED: ICD-10-PCS | Mod: CPTII,S$GLB,, | Performed by: INTERNAL MEDICINE

## 2023-01-24 PROCEDURE — 99999 PR PBB SHADOW E&M-EST. PATIENT-LVL IV: ICD-10-PCS | Mod: PBBFAC,,, | Performed by: INTERNAL MEDICINE

## 2023-01-24 PROCEDURE — 99999 PR PBB SHADOW E&M-EST. PATIENT-LVL IV: CPT | Mod: PBBFAC,,, | Performed by: INTERNAL MEDICINE

## 2023-01-24 PROCEDURE — 99214 OFFICE O/P EST MOD 30 MIN: CPT | Mod: S$GLB,,, | Performed by: INTERNAL MEDICINE

## 2023-01-24 PROCEDURE — 3008F BODY MASS INDEX DOCD: CPT | Mod: CPTII,S$GLB,, | Performed by: INTERNAL MEDICINE

## 2023-01-24 NOTE — PROGRESS NOTES
Subjective:       Patient ID: Chanel Esparza is a 67 y.o. female.    Chief Complaint: Follow-up (4 month f/u)    F/u chronic conditions    HPI: 66 y/o w/ HTN COPD tobacco dependence and obesity presents alone for scheduled follow up. Feels well no interest in quitting smoking she is walking for exercise. No LE swelling no orthopnea using laba/ics daily     Review of Systems   Constitutional:  Negative for activity change, appetite change, fatigue, fever and unexpected weight change.   HENT:  Negative for ear pain, rhinorrhea and sore throat.    Eyes:  Negative for discharge and visual disturbance.   Respiratory:  Negative for chest tightness, shortness of breath and wheezing.    Cardiovascular:  Negative for chest pain, palpitations and leg swelling.   Gastrointestinal:  Negative for abdominal pain, constipation and diarrhea.   Endocrine: Negative for cold intolerance and heat intolerance.   Genitourinary:  Negative for dysuria and hematuria.   Musculoskeletal:  Positive for arthralgias. Negative for joint swelling and neck stiffness.   Skin:  Negative for rash.   Neurological:  Negative for dizziness, syncope, weakness and headaches.   Psychiatric/Behavioral:  Negative for suicidal ideas.      Objective:     Vitals:    01/24/23 1054   BP: (!) 123/90   Pulse: 84   Resp: 18   Temp: 97.8 °F (36.6 °C)   TempSrc: Oral   SpO2: 99%   Weight: 86 kg (189 lb 9.5 oz)   Height: 5' (1.524 m)          Physical Exam  Constitutional:       General: She is not in acute distress.     Appearance: She is well-developed. She is obese.   HENT:      Head: Normocephalic and atraumatic.   Eyes:      Conjunctiva/sclera: Conjunctivae normal.   Cardiovascular:      Rate and Rhythm: Normal rate and regular rhythm.      Heart sounds: No murmur heard.    No friction rub. No gallop.   Pulmonary:      Effort: Pulmonary effort is normal. No respiratory distress.      Breath sounds: Normal breath sounds. No wheezing or rales.   Abdominal:       Palpations: Abdomen is soft.      Tenderness: There is no abdominal tenderness. There is no guarding or rebound.   Musculoskeletal:         General: No tenderness. Normal range of motion.      Cervical back: Normal range of motion.      Right lower leg: No edema.      Left lower leg: No edema.   Skin:     General: Skin is warm and dry.   Neurological:      Mental Status: She is alert and oriented to person, place, and time.      Cranial Nerves: No cranial nerve deficit.       Assessment and Plan   1. Other nonthrombocytopenic purpura  Stable hemoglobin no evidence of acut eblood loss on asa    2. Chronic bronchitis, unspecified chronic bronchitis type  Continue trelegy inhaler  - fluticasone-umeclidin-vilanter (TRELEGY ELLIPTA) 100-62.5-25 mcg DsDv; Inhale 1 puff into the lungs once daily.  Dispense: 180 each; Refill: 3    3. Morbid obesity  The patient is asked to make an attempt to improve diet and exercise patterns to aid in medical management of this problem.      4. Atherosclerosis of aorta  Recommend continued statin therapy    5. Tobacco dependence  She is contemplative on quitting    6. Impaired fasting glucose  A1c for screenign with next labs in four months  - Hemoglobin A1C; Future    7. Hypertension, essential  Continue thiaizide and acie electrolytes wNL   - CBC Auto Differential; Future  - Comprehensive Metabolic Panel; Future

## 2023-02-06 ENCOUNTER — PATIENT OUTREACH (OUTPATIENT)
Dept: ADMINISTRATIVE | Facility: HOSPITAL | Age: 68
End: 2023-02-06
Payer: MEDICARE

## 2023-03-14 ENCOUNTER — PATIENT OUTREACH (OUTPATIENT)
Dept: ADMINISTRATIVE | Facility: HOSPITAL | Age: 68
End: 2023-03-14
Payer: MEDICARE

## 2023-03-22 ENCOUNTER — PATIENT MESSAGE (OUTPATIENT)
Dept: FAMILY MEDICINE | Facility: CLINIC | Age: 68
End: 2023-03-22

## 2023-03-22 ENCOUNTER — TELEPHONE (OUTPATIENT)
Dept: FAMILY MEDICINE | Facility: CLINIC | Age: 68
End: 2023-03-22
Payer: MEDICARE

## 2023-03-22 ENCOUNTER — E-VISIT (OUTPATIENT)
Dept: FAMILY MEDICINE | Facility: CLINIC | Age: 68
End: 2023-03-22
Payer: MEDICARE

## 2023-03-22 DIAGNOSIS — J44.1 COPD WITH ACUTE EXACERBATION: Primary | ICD-10-CM

## 2023-03-22 PROCEDURE — 99213 PR OFFICE/OUTPT VISIT, EST, LEVL III, 20-29 MIN: ICD-10-PCS | Mod: 95,,, | Performed by: INTERNAL MEDICINE

## 2023-03-22 PROCEDURE — 99213 OFFICE O/P EST LOW 20 MIN: CPT | Mod: 95,,, | Performed by: INTERNAL MEDICINE

## 2023-03-22 RX ORDER — PREDNISONE 20 MG/1
40 TABLET ORAL DAILY
Qty: 10 TABLET | Refills: 0 | Status: SHIPPED | OUTPATIENT
Start: 2023-03-22 | End: 2023-03-27

## 2023-03-22 NOTE — PROGRESS NOTES
66 y/o w/ COPD and worsening non productive cough one month after covid inection. Will treat emperically for COPD exacerbation with prednisone 40mg daily x five days if no improvement will need clinic visit for exam

## 2023-03-22 NOTE — TELEPHONE ENCOUNTER
Patient declined seeing another provider and wanted this to be addressed at least by this week, she was agreeable with an evisit.

## 2023-03-22 NOTE — TELEPHONE ENCOUNTER
----- Message from Kiley Apodaca sent at 3/22/2023  8:27 AM CDT -----  Regarding: Sooner Appointment  .Type:  Sooner Appointment Request    Patient is requesting a sooner appointment.  Patient declined first available appointment listed as well as another facility and provider .  Patient will not accept being placed on the waitlist and is requesting a message be sent to doctor.    Name of Caller: Self    When is the first available appointment? July    Symptoms: bad cough and cant get rid of it , doesn't want it to lead to bronchitis    Would the patient rather a call back or a response via [facility]? call    Best Call Back Number: [phone]     Additional Information:

## 2023-03-23 ENCOUNTER — PATIENT MESSAGE (OUTPATIENT)
Dept: ADMINISTRATIVE | Facility: HOSPITAL | Age: 68
End: 2023-03-23
Payer: MEDICARE

## 2023-04-12 ENCOUNTER — PATIENT MESSAGE (OUTPATIENT)
Dept: ADMINISTRATIVE | Facility: HOSPITAL | Age: 68
End: 2023-04-12
Payer: MEDICARE

## 2023-05-15 ENCOUNTER — LAB VISIT (OUTPATIENT)
Dept: LAB | Facility: HOSPITAL | Age: 68
End: 2023-05-15
Attending: INTERNAL MEDICINE
Payer: MEDICARE

## 2023-05-15 ENCOUNTER — PATIENT MESSAGE (OUTPATIENT)
Dept: FAMILY MEDICINE | Facility: CLINIC | Age: 68
End: 2023-05-15
Payer: MEDICARE

## 2023-05-15 DIAGNOSIS — R73.01 IMPAIRED FASTING GLUCOSE: ICD-10-CM

## 2023-05-15 DIAGNOSIS — I10 HYPERTENSION, ESSENTIAL: ICD-10-CM

## 2023-05-15 LAB
ALBUMIN SERPL BCP-MCNC: 3.4 G/DL (ref 3.5–5.2)
ALP SERPL-CCNC: 58 U/L (ref 55–135)
ALT SERPL W/O P-5'-P-CCNC: 19 U/L (ref 10–44)
ANION GAP SERPL CALC-SCNC: 7 MMOL/L (ref 8–16)
AST SERPL-CCNC: 19 U/L (ref 10–40)
BASOPHILS # BLD AUTO: 0.05 K/UL (ref 0–0.2)
BASOPHILS NFR BLD: 0.6 % (ref 0–1.9)
BILIRUB SERPL-MCNC: 0.5 MG/DL (ref 0.1–1)
BUN SERPL-MCNC: 14 MG/DL (ref 8–23)
CALCIUM SERPL-MCNC: 9.8 MG/DL (ref 8.7–10.5)
CHLORIDE SERPL-SCNC: 102 MMOL/L (ref 95–110)
CO2 SERPL-SCNC: 31 MMOL/L (ref 23–29)
CREAT SERPL-MCNC: 0.9 MG/DL (ref 0.5–1.4)
DIFFERENTIAL METHOD: ABNORMAL
EOSINOPHIL # BLD AUTO: 0.3 K/UL (ref 0–0.5)
EOSINOPHIL NFR BLD: 3.4 % (ref 0–8)
ERYTHROCYTE [DISTWIDTH] IN BLOOD BY AUTOMATED COUNT: 13.8 % (ref 11.5–14.5)
EST. GFR  (NO RACE VARIABLE): >60 ML/MIN/1.73 M^2
ESTIMATED AVG GLUCOSE: 108 MG/DL (ref 68–131)
GLUCOSE SERPL-MCNC: 105 MG/DL (ref 70–110)
HBA1C MFR BLD: 5.4 % (ref 4–5.6)
HCT VFR BLD AUTO: 47.3 % (ref 37–48.5)
HGB BLD-MCNC: 14.9 G/DL (ref 12–16)
IMM GRANULOCYTES # BLD AUTO: 0.02 K/UL (ref 0–0.04)
IMM GRANULOCYTES NFR BLD AUTO: 0.3 % (ref 0–0.5)
LYMPHOCYTES # BLD AUTO: 1.4 K/UL (ref 1–4.8)
LYMPHOCYTES NFR BLD: 18 % (ref 18–48)
MCH RBC QN AUTO: 29.3 PG (ref 27–31)
MCHC RBC AUTO-ENTMCNC: 31.5 G/DL (ref 32–36)
MCV RBC AUTO: 93 FL (ref 82–98)
MONOCYTES # BLD AUTO: 0.8 K/UL (ref 0.3–1)
MONOCYTES NFR BLD: 9.7 % (ref 4–15)
NEUTROPHILS # BLD AUTO: 5.4 K/UL (ref 1.8–7.7)
NEUTROPHILS NFR BLD: 68 % (ref 38–73)
NRBC BLD-RTO: 0 /100 WBC
PLATELET # BLD AUTO: 214 K/UL (ref 150–450)
PMV BLD AUTO: 12.9 FL (ref 9.2–12.9)
POTASSIUM SERPL-SCNC: 4.5 MMOL/L (ref 3.5–5.1)
PROT SERPL-MCNC: 6.9 G/DL (ref 6–8.4)
RBC # BLD AUTO: 5.08 M/UL (ref 4–5.4)
SODIUM SERPL-SCNC: 140 MMOL/L (ref 136–145)
WBC # BLD AUTO: 7.91 K/UL (ref 3.9–12.7)

## 2023-05-15 PROCEDURE — 83036 HEMOGLOBIN GLYCOSYLATED A1C: CPT | Performed by: INTERNAL MEDICINE

## 2023-05-15 PROCEDURE — 85025 COMPLETE CBC W/AUTO DIFF WBC: CPT | Performed by: INTERNAL MEDICINE

## 2023-05-15 PROCEDURE — 80053 COMPREHEN METABOLIC PANEL: CPT | Performed by: INTERNAL MEDICINE

## 2023-05-15 PROCEDURE — 36415 COLL VENOUS BLD VENIPUNCTURE: CPT | Mod: PO | Performed by: INTERNAL MEDICINE

## 2023-05-31 ENCOUNTER — OFFICE VISIT (OUTPATIENT)
Dept: FAMILY MEDICINE | Facility: CLINIC | Age: 68
End: 2023-05-31
Payer: MEDICARE

## 2023-05-31 VITALS
TEMPERATURE: 98 F | OXYGEN SATURATION: 96 % | BODY MASS INDEX: 36.87 KG/M2 | HEIGHT: 60 IN | SYSTOLIC BLOOD PRESSURE: 136 MMHG | DIASTOLIC BLOOD PRESSURE: 68 MMHG | HEART RATE: 90 BPM | WEIGHT: 187.81 LBS

## 2023-05-31 DIAGNOSIS — R73.01 IMPAIRED FASTING GLUCOSE: ICD-10-CM

## 2023-05-31 DIAGNOSIS — J44.1 COPD WITH ACUTE EXACERBATION: ICD-10-CM

## 2023-05-31 DIAGNOSIS — J42 CHRONIC BRONCHITIS, UNSPECIFIED CHRONIC BRONCHITIS TYPE: Primary | ICD-10-CM

## 2023-05-31 DIAGNOSIS — F17.200 TOBACCO DEPENDENCE: ICD-10-CM

## 2023-05-31 DIAGNOSIS — I10 HYPERTENSION, ESSENTIAL: ICD-10-CM

## 2023-05-31 DIAGNOSIS — B35.9 TINEA: ICD-10-CM

## 2023-05-31 PROCEDURE — 3044F HG A1C LEVEL LT 7.0%: CPT | Mod: CPTII,S$GLB,, | Performed by: INTERNAL MEDICINE

## 2023-05-31 PROCEDURE — 99214 PR OFFICE/OUTPT VISIT, EST, LEVL IV, 30-39 MIN: ICD-10-PCS | Mod: S$GLB,,, | Performed by: INTERNAL MEDICINE

## 2023-05-31 PROCEDURE — 1159F MED LIST DOCD IN RCRD: CPT | Mod: CPTII,S$GLB,, | Performed by: INTERNAL MEDICINE

## 2023-05-31 PROCEDURE — 99999 PR PBB SHADOW E&M-EST. PATIENT-LVL IV: CPT | Mod: PBBFAC,,, | Performed by: INTERNAL MEDICINE

## 2023-05-31 PROCEDURE — 3044F PR MOST RECENT HEMOGLOBIN A1C LEVEL <7.0%: ICD-10-PCS | Mod: CPTII,S$GLB,, | Performed by: INTERNAL MEDICINE

## 2023-05-31 PROCEDURE — 3078F DIAST BP <80 MM HG: CPT | Mod: CPTII,S$GLB,, | Performed by: INTERNAL MEDICINE

## 2023-05-31 PROCEDURE — 99999 PR PBB SHADOW E&M-EST. PATIENT-LVL IV: ICD-10-PCS | Mod: PBBFAC,,, | Performed by: INTERNAL MEDICINE

## 2023-05-31 PROCEDURE — 4010F PR ACE/ARB THEARPY RXD/TAKEN: ICD-10-PCS | Mod: CPTII,S$GLB,, | Performed by: INTERNAL MEDICINE

## 2023-05-31 PROCEDURE — 3288F PR FALLS RISK ASSESSMENT DOCUMENTED: ICD-10-PCS | Mod: CPTII,S$GLB,, | Performed by: INTERNAL MEDICINE

## 2023-05-31 PROCEDURE — 99214 OFFICE O/P EST MOD 30 MIN: CPT | Mod: S$GLB,,, | Performed by: INTERNAL MEDICINE

## 2023-05-31 PROCEDURE — 1160F PR REVIEW ALL MEDS BY PRESCRIBER/CLIN PHARMACIST DOCUMENTED: ICD-10-PCS | Mod: CPTII,S$GLB,, | Performed by: INTERNAL MEDICINE

## 2023-05-31 PROCEDURE — 1126F AMNT PAIN NOTED NONE PRSNT: CPT | Mod: CPTII,S$GLB,, | Performed by: INTERNAL MEDICINE

## 2023-05-31 PROCEDURE — 3075F PR MOST RECENT SYSTOLIC BLOOD PRESS GE 130-139MM HG: ICD-10-PCS | Mod: CPTII,S$GLB,, | Performed by: INTERNAL MEDICINE

## 2023-05-31 PROCEDURE — 4010F ACE/ARB THERAPY RXD/TAKEN: CPT | Mod: CPTII,S$GLB,, | Performed by: INTERNAL MEDICINE

## 2023-05-31 PROCEDURE — 1101F PR PT FALLS ASSESS DOC 0-1 FALLS W/OUT INJ PAST YR: ICD-10-PCS | Mod: CPTII,S$GLB,, | Performed by: INTERNAL MEDICINE

## 2023-05-31 PROCEDURE — 3008F PR BODY MASS INDEX (BMI) DOCUMENTED: ICD-10-PCS | Mod: CPTII,S$GLB,, | Performed by: INTERNAL MEDICINE

## 2023-05-31 PROCEDURE — 1159F PR MEDICATION LIST DOCUMENTED IN MEDICAL RECORD: ICD-10-PCS | Mod: CPTII,S$GLB,, | Performed by: INTERNAL MEDICINE

## 2023-05-31 PROCEDURE — 1126F PR PAIN SEVERITY QUANTIFIED, NO PAIN PRESENT: ICD-10-PCS | Mod: CPTII,S$GLB,, | Performed by: INTERNAL MEDICINE

## 2023-05-31 PROCEDURE — 3008F BODY MASS INDEX DOCD: CPT | Mod: CPTII,S$GLB,, | Performed by: INTERNAL MEDICINE

## 2023-05-31 PROCEDURE — 1160F RVW MEDS BY RX/DR IN RCRD: CPT | Mod: CPTII,S$GLB,, | Performed by: INTERNAL MEDICINE

## 2023-05-31 PROCEDURE — 3075F SYST BP GE 130 - 139MM HG: CPT | Mod: CPTII,S$GLB,, | Performed by: INTERNAL MEDICINE

## 2023-05-31 PROCEDURE — 1101F PT FALLS ASSESS-DOCD LE1/YR: CPT | Mod: CPTII,S$GLB,, | Performed by: INTERNAL MEDICINE

## 2023-05-31 PROCEDURE — 3288F FALL RISK ASSESSMENT DOCD: CPT | Mod: CPTII,S$GLB,, | Performed by: INTERNAL MEDICINE

## 2023-05-31 PROCEDURE — 3078F PR MOST RECENT DIASTOLIC BLOOD PRESSURE < 80 MM HG: ICD-10-PCS | Mod: CPTII,S$GLB,, | Performed by: INTERNAL MEDICINE

## 2023-05-31 RX ORDER — CLOTRIMAZOLE 1 %
CREAM (GRAM) TOPICAL 2 TIMES DAILY
Qty: 60 G | Refills: 0 | Status: SHIPPED | OUTPATIENT
Start: 2023-05-31 | End: 2024-03-19

## 2023-05-31 RX ORDER — DOXYCYCLINE HYCLATE 100 MG
100 TABLET ORAL 2 TIMES DAILY
Qty: 14 TABLET | Refills: 0 | Status: SHIPPED | OUTPATIENT
Start: 2023-05-31 | End: 2023-09-07 | Stop reason: ALTCHOICE

## 2023-05-31 RX ORDER — NYSTATIN 100000 [USP'U]/G
POWDER TOPICAL DAILY
Qty: 60 G | Refills: 3 | Status: SHIPPED | OUTPATIENT
Start: 2023-05-31

## 2023-05-31 RX ORDER — PREDNISONE 20 MG/1
TABLET ORAL
Qty: 15 TABLET | Refills: 0 | Status: SHIPPED | OUTPATIENT
Start: 2023-05-31 | End: 2023-09-07 | Stop reason: ALTCHOICE

## 2023-05-31 NOTE — PROGRESS NOTES
Subjective:       Patient ID: Chanel Esparza is a 67 y.o. female.    Chief Complaint: Follow-up, Cough (X3 months), Fever (May 26th& 27th of 100 degrees ), and Rash (Jack underarms and Jack under breast x1-2 months)    Cough and rash    HPI: 66 y/o w/ COPD tobacco dependence HTN presents alone for follow up. Reports coughing x three months worse at start of day no improvement with inhalers feels more productive of clear sputum the last month reports temp up to 100 F three days ago no medications taken for fever. Also notes over last two weeks itching rash to bilateral axilla nd breast follds has been using OTC moisturizer withotu improvement     Cough  This is a chronic problem. The current episode started more than 1 month ago (x 3 months). The problem has been waxing and waning. The problem occurs constantly. The cough is Productive of sputum. Associated symptoms include postnasal drip and shortness of breath. Pertinent negatives include no chest pain, ear pain, fever, headaches, rash, rhinorrhea, sore throat or wheezing. Risk factors for lung disease include smoking/tobacco exposure. She has tried a beta-agonist inhaler for the symptoms. The treatment provided mild relief. Her past medical history is significant for COPD.   Rash  This is a new problem. The current episode started in the past 7 days. The problem has been gradually worsening since onset. The affected locations include the right axilla and left axilla (breast folds). The rash is characterized by itchiness, redness and scaling. She was exposed to nothing. Associated symptoms include coughing and shortness of breath. Pertinent negatives include no diarrhea, fatigue, fever, rhinorrhea or sore throat. Past treatments include nothing.   Review of Systems   Constitutional:  Negative for activity change, appetite change, fatigue, fever and unexpected weight change.   HENT:  Positive for postnasal drip. Negative for ear pain, rhinorrhea and sore throat.    Eyes:   Negative for discharge and visual disturbance.   Respiratory:  Positive for cough and shortness of breath. Negative for chest tightness and wheezing.    Cardiovascular:  Negative for chest pain, palpitations and leg swelling.   Gastrointestinal:  Negative for abdominal pain, constipation and diarrhea.   Endocrine: Negative for cold intolerance and heat intolerance.   Genitourinary:  Negative for dysuria and hematuria.   Musculoskeletal:  Negative for joint swelling and neck stiffness.   Skin:  Negative for rash.   Neurological:  Negative for dizziness, syncope, weakness and headaches.   Psychiatric/Behavioral:  Negative for suicidal ideas.      Objective:     Vitals:    05/31/23 0911 05/31/23 1007   BP: (!) 144/70 136/68   BP Location: Right arm Left arm   Patient Position: Sitting Sitting   BP Method: Large (Manual) Large (Manual)   Pulse: 90    Temp: 98.2 °F (36.8 °C)    TempSrc: Oral    SpO2: 96%    Weight: 85.2 kg (187 lb 13.3 oz)    Height: 5' (1.524 m)           Physical Exam  Constitutional:       Appearance: She is well-developed.   HENT:      Head: Normocephalic and atraumatic.   Eyes:      General: No scleral icterus.     Conjunctiva/sclera: Conjunctivae normal.   Cardiovascular:      Rate and Rhythm: Normal rate and regular rhythm.      Heart sounds: No murmur heard.    No friction rub. No gallop.   Pulmonary:      Effort: Pulmonary effort is normal. No respiratory distress.      Breath sounds: Wheezing present. No rales.      Comments: End expiratory wheezing bilaterally  Abdominal:      General: There is no distension.      Palpations: Abdomen is soft.      Tenderness: There is no abdominal tenderness. There is no guarding or rebound.   Musculoskeletal:         General: No tenderness. Normal range of motion.      Cervical back: Normal range of motion.      Right lower leg: No edema.      Left lower leg: No edema.   Skin:     General: Skin is warm and dry.      Comments: Right axilla shows erythematous  papules on scaled base without ulceration   Neurological:      Mental Status: She is alert and oriented to person, place, and time.      Cranial Nerves: No cranial nerve deficit.     Chest xray without consolidation or effusion  Assessment and Plan   1. Chronic bronchitis, unspecified chronic bronchitis type  See below  - CBC Auto Differential; Future  - Basic Metabolic Panel; Future    2. Tobacco dependence  She is not interested in assistance to quit smoking pointed out ongoing damage from continues cigarette smoking will worsen her underlying lung diseas3    3. Hypertension, essential  At goal cotninue current mediacitons  - CBC Auto Differential; Future  - Basic Metabolic Panel; Future    4. Tinea  Topical azole cream bid x two weeks then nystatin for prevention  - clotrimazole (LOTRIMIN) 1 % cream; Apply topically 2 (two) times daily. For 14 days  Dispense: 60 g; Refill: 0  - nystatin (MYCOSTATIN) powder; Apply topically once daily.  Dispense: 60 g; Refill: 3  - Hemoglobin A1C; Future    5. COPD with acute exacerbation  Steroid and doxycyline to contact clinic if no improvement after one week to consider advanced imaging  - X-Ray Chest PA And Lateral; Future  - doxycycline (VIBRA-TABS) 100 MG tablet; Take 1 tablet (100 mg total) by mouth 2 (two) times daily.  Dispense: 14 tablet; Refill: 0  - predniSONE (DELTASONE) 20 MG tablet; Two tabs (40mg) PO daily x five days then one tab (20mg) po daily x five days  Dispense: 15 tablet; Refill: 0    6. Impaired fasting glucose  Screen with a1c  - Hemoglobin A1C; Future

## 2023-06-06 DIAGNOSIS — I70.0 ATHEROSCLEROSIS OF AORTA: ICD-10-CM

## 2023-06-06 DIAGNOSIS — E78.2 MIXED HYPERLIPIDEMIA: ICD-10-CM

## 2023-06-06 RX ORDER — ATORVASTATIN CALCIUM 40 MG/1
TABLET, FILM COATED ORAL
Qty: 90 TABLET | Refills: 3 | OUTPATIENT
Start: 2023-06-06

## 2023-06-06 NOTE — TELEPHONE ENCOUNTER
Refill Decision Note   Chanel Esparza  is requesting a refill authorization.  Brief Assessment and Rationale for Refill:  Quick Discontinue     Medication Therapy Plan:  The original prescription was discontinued on 5/31/2023 by Warner Richey MD for the following reason: Patient no longer taking.      Comments:     Note composed:11:36 AM 06/06/2023             Appointments     Last Visit   5/31/2023 Warner Richey MD   Next Visit   9/7/2023 Warner Richey MD           Appointments     Last Visit   5/31/2023 Warner Richey MD   Next Visit   9/7/2023 Warner Richey MD

## 2023-06-06 NOTE — TELEPHONE ENCOUNTER
No care due was identified.  Health [facility] Embedded Care Due Messages. Reference number: [id number].   6/06/2023 10:16:03 AM CDT

## 2023-06-07 ENCOUNTER — PATIENT OUTREACH (OUTPATIENT)
Dept: ADMINISTRATIVE | Facility: HOSPITAL | Age: 68
End: 2023-06-07
Payer: MEDICARE

## 2023-06-07 NOTE — PROGRESS NOTES
Office visit scheduled for 09/07/23. Patient is showing as non-compliant on the Statin Therapy Measure,please renew if they are taking a statin or d/c if they are not taking or not eligible for statin use. Immunization's updated.

## 2023-07-03 DIAGNOSIS — J45.901 ACUTE EXACERBATION OF COPD WITH ASTHMA: ICD-10-CM

## 2023-07-03 DIAGNOSIS — J44.1 ACUTE EXACERBATION OF COPD WITH ASTHMA: ICD-10-CM

## 2023-07-03 RX ORDER — IPRATROPIUM BROMIDE AND ALBUTEROL SULFATE 2.5; .5 MG/3ML; MG/3ML
3 SOLUTION RESPIRATORY (INHALATION) EVERY 6 HOURS PRN
Qty: 60 ML | Refills: 5 | Status: SHIPPED | OUTPATIENT
Start: 2023-07-03 | End: 2024-07-02

## 2023-07-03 NOTE — TELEPHONE ENCOUNTER
Refill Routing Note   Medication(s) are not appropriate for processing by [facility] for the following reason(s):      No active prescription written by PCP:  on med list  Due for refill >6 months ago    [facility] action(s):  Defer Care Due:  None identified          Appointments  past 12m or future 3m with PCP    Date Provider   Last Visit   2023 Warner Richey MD   Next Visit   2023 Warner Richey MD   ED visits in past 90 days: 0        Note composed:4:40 PM 2023

## 2023-07-03 NOTE — TELEPHONE ENCOUNTER
No care due was identified.  [facility] Embedded Care Due Messages. Reference number: [id number].   7/03/2023 8:16:14 AM CDT

## 2023-07-17 DIAGNOSIS — J42 CHRONIC BRONCHITIS, UNSPECIFIED CHRONIC BRONCHITIS TYPE: ICD-10-CM

## 2023-07-17 NOTE — TELEPHONE ENCOUNTER
----- Message from Tianna Dumont sent at 7/17/2023  9:44 AM CDT -----  Regarding: self [phone]  Type: Patient Call Back    Who called: self     What is the request in detail: stated she was told they are recalling her inhaler and would like to talk to someone.     Can the clinic reply by [facility]? no    Would the patient rather a call back or a response via [facility]? Call back     Best call back number: [phone]

## 2023-07-17 NOTE — TELEPHONE ENCOUNTER
Rt pt call , asked if the albuterol (PROVENTIL/VENTOLIN HFA) 90 mcg/actuation inhaler is what she's referring too . Asked if pharmacy called her or the . Says neither neighbor received a letter . She wanted to confirm prior to to pharmacy for a refill . Explained this may be a recall not nescarrliy a discontinue     Called pharmacy to continue , pharmacist  said they hadn't heard of any recalls or discontinuation of the inhaler . Letter would need to be bought in .     Notified pt   LOV 5/31/2023  NOV 9/7/2023

## 2023-07-17 NOTE — TELEPHONE ENCOUNTER
No care due was identified.  [facility] Embedded Care Due Messages. Reference number: [id number].   7/17/2023 10:52:39 AM CDT

## 2023-07-18 RX ORDER — ALBUTEROL SULFATE 90 UG/1
AEROSOL, METERED RESPIRATORY (INHALATION)
Qty: 25.5 G | Refills: 3 | Status: SHIPPED | OUTPATIENT
Start: 2023-07-18

## 2023-07-18 NOTE — TELEPHONE ENCOUNTER
Refill Decision Note   Chanel Esparza  is requesting a refill authorization.  Brief Assessment and Rationale for Refill:  Approve     Medication Therapy Plan:         Alert overridden per protocol: Yes   Comments:     Note composed:8:18 AM 07/18/2023             Appointments     Last Visit   5/31/2023 Warner Richey MD   Next Visit   9/7/2023 Warner Richey MD

## 2023-08-30 ENCOUNTER — LAB VISIT (OUTPATIENT)
Dept: LAB | Facility: HOSPITAL | Age: 68
End: 2023-08-30
Attending: INTERNAL MEDICINE
Payer: MEDICARE

## 2023-08-30 DIAGNOSIS — R73.01 IMPAIRED FASTING GLUCOSE: ICD-10-CM

## 2023-08-30 DIAGNOSIS — J42 CHRONIC BRONCHITIS, UNSPECIFIED CHRONIC BRONCHITIS TYPE: ICD-10-CM

## 2023-08-30 DIAGNOSIS — I10 HYPERTENSION, ESSENTIAL: ICD-10-CM

## 2023-08-30 DIAGNOSIS — B35.9 TINEA: ICD-10-CM

## 2023-08-30 LAB
ANION GAP SERPL CALC-SCNC: 10 MMOL/L (ref 8–16)
BASOPHILS # BLD AUTO: 0.04 K/UL (ref 0–0.2)
BASOPHILS NFR BLD: 0.6 % (ref 0–1.9)
BUN SERPL-MCNC: 17 MG/DL (ref 8–23)
CALCIUM SERPL-MCNC: 9.4 MG/DL (ref 8.7–10.5)
CHLORIDE SERPL-SCNC: 105 MMOL/L (ref 95–110)
CO2 SERPL-SCNC: 26 MMOL/L (ref 23–29)
CREAT SERPL-MCNC: 0.9 MG/DL (ref 0.5–1.4)
DIFFERENTIAL METHOD: ABNORMAL
EOSINOPHIL # BLD AUTO: 0.2 K/UL (ref 0–0.5)
EOSINOPHIL NFR BLD: 3.3 % (ref 0–8)
ERYTHROCYTE [DISTWIDTH] IN BLOOD BY AUTOMATED COUNT: 13.1 % (ref 11.5–14.5)
EST. GFR  (NO RACE VARIABLE): >60 ML/MIN/1.73 M^2
ESTIMATED AVG GLUCOSE: 100 MG/DL (ref 68–131)
GLUCOSE SERPL-MCNC: 98 MG/DL (ref 70–110)
HBA1C MFR BLD: 5.1 % (ref 4–5.6)
HCT VFR BLD AUTO: 47.3 % (ref 37–48.5)
HGB BLD-MCNC: 15.1 G/DL (ref 12–16)
IMM GRANULOCYTES # BLD AUTO: 0.02 K/UL (ref 0–0.04)
IMM GRANULOCYTES NFR BLD AUTO: 0.3 % (ref 0–0.5)
LYMPHOCYTES # BLD AUTO: 1.4 K/UL (ref 1–4.8)
LYMPHOCYTES NFR BLD: 19 % (ref 18–48)
MCH RBC QN AUTO: 29.7 PG (ref 27–31)
MCHC RBC AUTO-ENTMCNC: 31.9 G/DL (ref 32–36)
MCV RBC AUTO: 93 FL (ref 82–98)
MONOCYTES # BLD AUTO: 0.7 K/UL (ref 0.3–1)
MONOCYTES NFR BLD: 9.7 % (ref 4–15)
NEUTROPHILS # BLD AUTO: 4.9 K/UL (ref 1.8–7.7)
NEUTROPHILS NFR BLD: 67.1 % (ref 38–73)
NRBC BLD-RTO: 0 /100 WBC
PLATELET # BLD AUTO: 191 K/UL (ref 150–450)
PMV BLD AUTO: 12.9 FL (ref 9.2–12.9)
POTASSIUM SERPL-SCNC: 4.1 MMOL/L (ref 3.5–5.1)
RBC # BLD AUTO: 5.08 M/UL (ref 4–5.4)
SODIUM SERPL-SCNC: 141 MMOL/L (ref 136–145)
WBC # BLD AUTO: 7.22 K/UL (ref 3.9–12.7)

## 2023-08-30 PROCEDURE — 85025 COMPLETE CBC W/AUTO DIFF WBC: CPT | Performed by: INTERNAL MEDICINE

## 2023-08-30 PROCEDURE — 36415 COLL VENOUS BLD VENIPUNCTURE: CPT | Mod: PO | Performed by: INTERNAL MEDICINE

## 2023-08-30 PROCEDURE — 80048 BASIC METABOLIC PNL TOTAL CA: CPT | Performed by: INTERNAL MEDICINE

## 2023-08-30 PROCEDURE — 83036 HEMOGLOBIN GLYCOSYLATED A1C: CPT | Performed by: INTERNAL MEDICINE

## 2023-09-06 ENCOUNTER — OFFICE VISIT (OUTPATIENT)
Dept: URGENT CARE | Facility: CLINIC | Age: 68
End: 2023-09-06
Payer: MEDICARE

## 2023-09-06 VITALS
HEART RATE: 95 BPM | BODY MASS INDEX: 36.71 KG/M2 | WEIGHT: 187 LBS | TEMPERATURE: 98 F | HEIGHT: 60 IN | RESPIRATION RATE: 18 BRPM | OXYGEN SATURATION: 96 % | DIASTOLIC BLOOD PRESSURE: 87 MMHG | SYSTOLIC BLOOD PRESSURE: 170 MMHG

## 2023-09-06 DIAGNOSIS — I10 ELEVATED BLOOD PRESSURE READING WITH DIAGNOSIS OF HYPERTENSION: ICD-10-CM

## 2023-09-06 DIAGNOSIS — R22.0 RIGHT FACIAL SWELLING: Primary | ICD-10-CM

## 2023-09-06 PROCEDURE — 99213 PR OFFICE/OUTPT VISIT, EST, LEVL III, 20-29 MIN: ICD-10-PCS | Mod: S$GLB,,, | Performed by: NURSE PRACTITIONER

## 2023-09-06 PROCEDURE — 99213 OFFICE O/P EST LOW 20 MIN: CPT | Mod: S$GLB,,, | Performed by: NURSE PRACTITIONER

## 2023-09-06 RX ORDER — AMOXICILLIN AND CLAVULANATE POTASSIUM 875; 125 MG/1; MG/1
1 TABLET, FILM COATED ORAL 2 TIMES DAILY
Qty: 14 TABLET | Refills: 0 | Status: SHIPPED | OUTPATIENT
Start: 2023-09-06 | End: 2023-09-14

## 2023-09-06 RX ORDER — PREDNISONE 20 MG/1
40 TABLET ORAL
Status: COMPLETED | OUTPATIENT
Start: 2023-09-06 | End: 2023-09-06

## 2023-09-06 RX ORDER — PREDNISONE 20 MG/1
20 TABLET ORAL DAILY
Qty: 5 TABLET | Refills: 0 | Status: SHIPPED | OUTPATIENT
Start: 2023-09-06 | End: 2023-09-11

## 2023-09-06 RX ADMIN — PREDNISONE 40 MG: 20 TABLET ORAL at 04:09

## 2023-09-06 NOTE — PROGRESS NOTES
Subjective:      Patient ID: Chanel Esparza is a 67 y.o. female.    Vitals:  height is 5' (1.524 m) and weight is 84.8 kg (187 lb). Her oral temperature is 98.1 °F (36.7 °C). Her blood pressure is 170/87 (abnormal) and her pulse is 95. Her respiration is 18 and oxygen saturation is 96%.     Chief Complaint: Otalgia (/)    67-year-old female with medical history as listed below presents to clinic for evaluation of right facial swelling for the last 4 days.  She reports pain to her right jaw all that radiates down the right side of her neck, causing headaches.  She reports taking Tylenol with minimal relief.  She denies any dental problems.  States wears dentures.  Denies fever.  She is awake and alert, answers questions appropriately, no acute distress noted on today's visit.    Otalgia   There is pain in the right ear. This is a new problem. Episode onset: 4 daays ago. The problem occurs constantly. The problem has been unchanged. There has been no fever. The pain is at a severity of 10/10. The pain is severe. Associated symptoms include headaches. Pertinent negatives include no vomiting. She has tried acetaminophen for the symptoms. The treatment provided no relief.       Constitution: Negative for activity change, appetite change, chills, sweating, fatigue and fever.   HENT:  Positive for ear pain and facial swelling.    Gastrointestinal:  Negative for nausea and vomiting.   Neurological:  Positive for headaches. Negative for dizziness.      Objective:     Physical Exam   Constitutional: She is oriented to person, place, and time. She does not appear ill. No distress.   HENT:   Head: Normocephalic and atraumatic.       Ears:   Right Ear: Tympanic membrane and external ear normal.   Left Ear: External ear normal.   Nose: Nose normal.   Mouth/Throat: No oropharyngeal exudate or posterior oropharyngeal erythema. Oropharynx is clear.   Eyes: Conjunctivae are normal. Right eye exhibits no discharge. Left eye exhibits no  discharge.   Cardiovascular: Normal rate.   Pulmonary/Chest: Effort normal. No respiratory distress.   Abdominal: Normal appearance.   Musculoskeletal: Normal range of motion.         General: Normal range of motion.   Neurological: She is alert and oriented to person, place, and time.   Skin: Skin is not diaphoretic.   Psychiatric: Mood and thought content normal.   Nursing note and vitals reviewed.      Assessment:     1. Right facial swelling    2. Elevated blood pressure reading with diagnosis of hypertension        Plan:       Right facial swelling  -     predniSONE tablet 40 mg  -     predniSONE (DELTASONE) 20 MG tablet; Take 1 tablet (20 mg total) by mouth once daily. for 5 days  Dispense: 5 tablet; Refill: 0  -     amoxicillin-clavulanate 875-125mg (AUGMENTIN) 875-125 mg per tablet; Take 1 tablet by mouth 2 (two) times daily.  Dispense: 14 tablet; Refill: 0    Elevated blood pressure reading with diagnosis of hypertension    Elevated Blood Pressure Reading  - Your blood pressure was elevated during your visit to the urgent care. It was not so high that immediate care was needed but it is recommended that you monitor your blood pressure over the next week or two to make sure that it is not staying elevated.    - Please have your blood pressure taken 2-3 times daily at different times of the day.  - Write all of those blood pressures down and record the time that they were taken.   - Keep all that information and take it with you to see your Primary Care Physician.   - According to ACEP guidelines, workup and treatment of hypertension in asymptomatic patient is not warranted:  (1) Initiating treatment for asymptomatic hypertension in the ED is not necessary when patients have follow-up.  (2) Rapidly lowering blood pressure in asymptomatic patients in the ED is unnecessary and may be harmful in some patients  (3) When ED treatment for asymptomatic hypertension is initiated, blood pressure management should  attempt to gradually lower blood pressure and should not be expected to be normalized during the initial ED visit.       - Suspect possible Parotitis, will start short oral course of prednisone, Augmentin for infection.  Follow with PCP as scheduled.  Pt is in agreement with plan.    Patient Instructions   - Follow up with your PCP or specialty clinic as directed in the next 1-2 weeks if not improved or as needed.  You can call [phone] to schedule an appointment with the appropriate provider.    - Go to the ER or seek medical attention immediately if you develop new or worsening symptoms.    - You must understand that you have received an Urgent Care treatment only and that you may be released before all of your medical problems are known or treated.   - You, the patient, will arrange for follow up care as instructed.   - If your condition worsens or fails to improve we recommend that you receive another evaluation at the ER immediately or contact your PCP to discuss your concerns or return here.

## 2023-09-06 NOTE — PATIENT INSTRUCTIONS
- Follow up with your PCP or specialty clinic as directed in the next 1-2 weeks if not improved or as needed.  You can call [phone] to schedule an appointment with the appropriate provider.    - Go to the ER or seek medical attention immediately if you develop new or worsening symptoms.    - You must understand that you have received an Urgent Care treatment only and that you may be released before all of your medical problems are known or treated.   - You, the patient, will arrange for follow up care as instructed.   - If your condition worsens or fails to improve we recommend that you receive another evaluation at the ER immediately or contact your PCP to discuss your concerns or return here.

## 2023-09-07 ENCOUNTER — LAB VISIT (OUTPATIENT)
Dept: LAB | Facility: HOSPITAL | Age: 68
End: 2023-09-07
Attending: INTERNAL MEDICINE
Payer: MEDICARE

## 2023-09-07 ENCOUNTER — TELEPHONE (OUTPATIENT)
Dept: FAMILY MEDICINE | Facility: CLINIC | Age: 68
End: 2023-09-07
Payer: MEDICARE

## 2023-09-07 ENCOUNTER — OFFICE VISIT (OUTPATIENT)
Dept: FAMILY MEDICINE | Facility: CLINIC | Age: 68
End: 2023-09-07
Payer: MEDICARE

## 2023-09-07 VITALS
DIASTOLIC BLOOD PRESSURE: 77 MMHG | WEIGHT: 181.44 LBS | HEIGHT: 60 IN | HEART RATE: 88 BPM | TEMPERATURE: 98 F | SYSTOLIC BLOOD PRESSURE: 119 MMHG | OXYGEN SATURATION: 96 % | RESPIRATION RATE: 18 BRPM | BODY MASS INDEX: 35.62 KG/M2

## 2023-09-07 DIAGNOSIS — J42 CHRONIC BRONCHITIS, UNSPECIFIED CHRONIC BRONCHITIS TYPE: ICD-10-CM

## 2023-09-07 DIAGNOSIS — R68.84 JAW PAIN: ICD-10-CM

## 2023-09-07 DIAGNOSIS — K11.20 PAROTIDITIS: Primary | ICD-10-CM

## 2023-09-07 DIAGNOSIS — Z78.9 STATIN INTOLERANCE: ICD-10-CM

## 2023-09-07 LAB — ERYTHROCYTE [SEDIMENTATION RATE] IN BLOOD BY WESTERGREN METHOD: 30 MM/HR (ref 0–20)

## 2023-09-07 PROCEDURE — 3288F FALL RISK ASSESSMENT DOCD: CPT | Mod: CPTII,S$GLB,, | Performed by: INTERNAL MEDICINE

## 2023-09-07 PROCEDURE — 3078F DIAST BP <80 MM HG: CPT | Mod: CPTII,S$GLB,, | Performed by: INTERNAL MEDICINE

## 2023-09-07 PROCEDURE — 1101F PR PT FALLS ASSESS DOC 0-1 FALLS W/OUT INJ PAST YR: ICD-10-PCS | Mod: CPTII,S$GLB,, | Performed by: INTERNAL MEDICINE

## 2023-09-07 PROCEDURE — 99214 OFFICE O/P EST MOD 30 MIN: CPT | Mod: S$GLB,,, | Performed by: INTERNAL MEDICINE

## 2023-09-07 PROCEDURE — 1101F PT FALLS ASSESS-DOCD LE1/YR: CPT | Mod: CPTII,S$GLB,, | Performed by: INTERNAL MEDICINE

## 2023-09-07 PROCEDURE — 1159F MED LIST DOCD IN RCRD: CPT | Mod: CPTII,S$GLB,, | Performed by: INTERNAL MEDICINE

## 2023-09-07 PROCEDURE — 4010F ACE/ARB THERAPY RXD/TAKEN: CPT | Mod: CPTII,S$GLB,, | Performed by: INTERNAL MEDICINE

## 2023-09-07 PROCEDURE — 36415 COLL VENOUS BLD VENIPUNCTURE: CPT | Mod: PN | Performed by: INTERNAL MEDICINE

## 2023-09-07 PROCEDURE — 1125F PR PAIN SEVERITY QUANTIFIED, PAIN PRESENT: ICD-10-PCS | Mod: CPTII,S$GLB,, | Performed by: INTERNAL MEDICINE

## 2023-09-07 PROCEDURE — 99999 PR PBB SHADOW E&M-EST. PATIENT-LVL IV: ICD-10-PCS | Mod: PBBFAC,,, | Performed by: INTERNAL MEDICINE

## 2023-09-07 PROCEDURE — 3078F PR MOST RECENT DIASTOLIC BLOOD PRESSURE < 80 MM HG: ICD-10-PCS | Mod: CPTII,S$GLB,, | Performed by: INTERNAL MEDICINE

## 2023-09-07 PROCEDURE — 99999 PR PBB SHADOW E&M-EST. PATIENT-LVL IV: CPT | Mod: PBBFAC,,, | Performed by: INTERNAL MEDICINE

## 2023-09-07 PROCEDURE — 3044F PR MOST RECENT HEMOGLOBIN A1C LEVEL <7.0%: ICD-10-PCS | Mod: CPTII,S$GLB,, | Performed by: INTERNAL MEDICINE

## 2023-09-07 PROCEDURE — 3008F PR BODY MASS INDEX (BMI) DOCUMENTED: ICD-10-PCS | Mod: CPTII,S$GLB,, | Performed by: INTERNAL MEDICINE

## 2023-09-07 PROCEDURE — 3288F PR FALLS RISK ASSESSMENT DOCUMENTED: ICD-10-PCS | Mod: CPTII,S$GLB,, | Performed by: INTERNAL MEDICINE

## 2023-09-07 PROCEDURE — 4010F PR ACE/ARB THEARPY RXD/TAKEN: ICD-10-PCS | Mod: CPTII,S$GLB,, | Performed by: INTERNAL MEDICINE

## 2023-09-07 PROCEDURE — 1125F AMNT PAIN NOTED PAIN PRSNT: CPT | Mod: CPTII,S$GLB,, | Performed by: INTERNAL MEDICINE

## 2023-09-07 PROCEDURE — 1160F RVW MEDS BY RX/DR IN RCRD: CPT | Mod: CPTII,S$GLB,, | Performed by: INTERNAL MEDICINE

## 2023-09-07 PROCEDURE — 1160F PR REVIEW ALL MEDS BY PRESCRIBER/CLIN PHARMACIST DOCUMENTED: ICD-10-PCS | Mod: CPTII,S$GLB,, | Performed by: INTERNAL MEDICINE

## 2023-09-07 PROCEDURE — 3008F BODY MASS INDEX DOCD: CPT | Mod: CPTII,S$GLB,, | Performed by: INTERNAL MEDICINE

## 2023-09-07 PROCEDURE — 85652 RBC SED RATE AUTOMATED: CPT | Performed by: INTERNAL MEDICINE

## 2023-09-07 PROCEDURE — 1159F PR MEDICATION LIST DOCUMENTED IN MEDICAL RECORD: ICD-10-PCS | Mod: CPTII,S$GLB,, | Performed by: INTERNAL MEDICINE

## 2023-09-07 PROCEDURE — 99214 PR OFFICE/OUTPT VISIT, EST, LEVL IV, 30-39 MIN: ICD-10-PCS | Mod: S$GLB,,, | Performed by: INTERNAL MEDICINE

## 2023-09-07 PROCEDURE — 3044F HG A1C LEVEL LT 7.0%: CPT | Mod: CPTII,S$GLB,, | Performed by: INTERNAL MEDICINE

## 2023-09-07 PROCEDURE — 3074F SYST BP LT 130 MM HG: CPT | Mod: CPTII,S$GLB,, | Performed by: INTERNAL MEDICINE

## 2023-09-07 PROCEDURE — 3074F PR MOST RECENT SYSTOLIC BLOOD PRESSURE < 130 MM HG: ICD-10-PCS | Mod: CPTII,S$GLB,, | Performed by: INTERNAL MEDICINE

## 2023-09-07 NOTE — TELEPHONE ENCOUNTER
Patient contacted and ID confirmed by name and   Esr not significantly elevated to suggest GCA manan treat for parotiditis with antibiotics if swlling does not subside would get CT w/ contrast of head and neck     ----- Message from Warner Richey MD sent at 2023  9:14 AM CDT -----  Regarding: call with sed rate result

## 2023-09-07 NOTE — PROGRESS NOTES
"Subjective:       Patient ID: Chanel Esparza is a 67 y.o. female.    Chief Complaint: Follow-up (Right foot swelling and right side of face swollen)    F/u chronic conditions discuss face swelling    HPI: 66 y/o w/ COPD HTN presents alone for scheduled follow up. For last five days has been experencing right facial swelling and pain pain to periauricular area no redness no drainage seen in urgent care yesterday prescribed augmentin and prednisone has not yet started this. She does not chew on right side due to ill fitting dentures no oral ulcers or bleeding no loss of vision or diplopia no shoulder pain no scalp tenderness no pain with overhead activities no dysphagia. Breathing "fine"       Review of Systems   Constitutional:  Negative for activity change, appetite change, fatigue, fever and unexpected weight change.   HENT:  Positive for dental problem. Negative for ear pain, rhinorrhea and sore throat.    Eyes:  Negative for discharge and visual disturbance.   Respiratory:  Negative for chest tightness, shortness of breath and wheezing.    Cardiovascular:  Negative for chest pain, palpitations and leg swelling.   Gastrointestinal:  Negative for abdominal pain, constipation and diarrhea.   Endocrine: Negative for cold intolerance and heat intolerance.   Genitourinary:  Negative for dysuria and hematuria.   Musculoskeletal:  Negative for joint swelling and neck stiffness.   Skin:  Negative for rash.   Neurological:  Negative for dizziness, syncope, weakness and headaches.   Psychiatric/Behavioral:  Negative for suicidal ideas.        Objective:     Vitals:    09/07/23 0900   BP: 119/77   Pulse: 88   Resp: 18   Temp: 97.7 °F (36.5 °C)   TempSrc: Oral   SpO2: 96%   Weight: 82.3 kg (181 lb 7 oz)   Height: 5' (1.524 m)          Physical Exam  Constitutional:       Appearance: She is well-developed.   HENT:      Head: Normocephalic and atraumatic.   Eyes:      General: No scleral icterus.     Conjunctiva/sclera: Conjunctivae " normal.   Cardiovascular:      Rate and Rhythm: Normal rate and regular rhythm.      Heart sounds: No murmur heard.     No friction rub. No gallop.   Pulmonary:      Effort: Pulmonary effort is normal.      Breath sounds: Normal breath sounds. No wheezing or rales.   Abdominal:      Palpations: Abdomen is soft.      Tenderness: There is no abdominal tenderness. There is no guarding or rebound.   Musculoskeletal:         General: No tenderness. Normal range of motion.      Cervical back: Normal range of motion.      Right lower leg: No edema.      Left lower leg: No edema.   Lymphadenopathy:      Cervical: No cervical adenopathy.   Skin:     General: Skin is warm and dry.   Neurological:      Mental Status: She is alert and oriented to person, place, and time.      Cranial Nerves: No cranial nerve deficit.      Comments: Symmetric face no ptosis EOMI OU         Assessment and Plan   1. Parotiditis  Short steroid course agree with antibiotics if swelling not improved consider CT of head and neck with contrast     2. Jaw pain  Check esr to evaluate for GCA  - Sedimentation rate; Future    3. Chronic bronchitis, unspecified chronic bronchitis type  Continue inhalers    4. Statin intolerance  She will not take statin due to prior history of myalgia

## 2023-09-11 ENCOUNTER — PATIENT OUTREACH (OUTPATIENT)
Dept: ADMINISTRATIVE | Facility: HOSPITAL | Age: 68
End: 2023-09-11
Payer: MEDICARE

## 2023-09-11 ENCOUNTER — HOSPITAL ENCOUNTER (OUTPATIENT)
Dept: RADIOLOGY | Facility: HOSPITAL | Age: 68
Discharge: HOME | End: 2023-09-11
Attending: INTERNAL MEDICINE
Payer: MEDICARE

## 2023-09-11 ENCOUNTER — PATIENT MESSAGE (OUTPATIENT)
Dept: FAMILY MEDICINE | Facility: CLINIC | Age: 68
End: 2023-09-11
Payer: MEDICARE

## 2023-09-11 DIAGNOSIS — R68.84 JAW PAIN, NON-TMJ: ICD-10-CM

## 2023-09-11 DIAGNOSIS — R68.84 JAW PAIN, NON-TMJ: Primary | ICD-10-CM

## 2023-09-11 PROCEDURE — 70470 CT HEAD WITH AND WITHOUT: ICD-10-PCS | Mod: 26,,, | Performed by: RADIOLOGY

## 2023-09-11 PROCEDURE — 70470 CT HEAD/BRAIN W/O & W/DYE: CPT | Mod: 26,,, | Performed by: RADIOLOGY

## 2023-09-11 PROCEDURE — 70470 CT HEAD/BRAIN W/O & W/DYE: CPT | Mod: TC

## 2023-09-11 PROCEDURE — 25500020 PHARM REV CODE 255: Performed by: INTERNAL MEDICINE

## 2023-09-11 RX ORDER — TIZANIDINE 4 MG/1
4 TABLET ORAL EVERY 6 HOURS PRN
Qty: 40 TABLET | Refills: 0 | Status: SHIPPED | OUTPATIENT
Start: 2023-09-11 | End: 2024-02-28 | Stop reason: ALTCHOICE

## 2023-09-11 RX ADMIN — IOHEXOL 75 ML: 350 INJECTION, SOLUTION INTRAVENOUS at 03:09

## 2023-09-12 ENCOUNTER — TELEPHONE (OUTPATIENT)
Dept: FAMILY MEDICINE | Facility: CLINIC | Age: 68
End: 2023-09-12
Payer: MEDICARE

## 2023-09-12 DIAGNOSIS — K11.8 MASS OF RIGHT PAROTID GLAND: Primary | ICD-10-CM

## 2023-09-12 NOTE — TELEPHONE ENCOUNTER
Patient contacted and ID confirmed by name and   Reviewed CT imaging showing two masses to right parotid gland with one area of necrosis needs ENT evaluation to discuss possible biopsy will send this urgently.

## 2023-09-14 ENCOUNTER — OFFICE VISIT (OUTPATIENT)
Dept: OTOLARYNGOLOGY | Facility: CLINIC | Age: 68
End: 2023-09-14
Payer: MEDICARE

## 2023-09-14 ENCOUNTER — ANESTHESIA EVENT (OUTPATIENT)
Dept: SURGERY | Facility: HOSPITAL | Age: 68
End: 2023-09-14
Payer: MEDICARE

## 2023-09-14 VITALS — HEIGHT: 60 IN | BODY MASS INDEX: 35.53 KG/M2 | WEIGHT: 181 LBS

## 2023-09-14 DIAGNOSIS — R07.0 THROAT PAIN IN ADULT: ICD-10-CM

## 2023-09-14 DIAGNOSIS — K11.8 MASS OF RIGHT PAROTID GLAND: Primary | ICD-10-CM

## 2023-09-14 DIAGNOSIS — K11.8 MASS OF RIGHT PAROTID GLAND: ICD-10-CM

## 2023-09-14 DIAGNOSIS — J39.2 OROPHARYNGEAL LESION: Primary | ICD-10-CM

## 2023-09-14 PROCEDURE — 1125F AMNT PAIN NOTED PAIN PRSNT: CPT | Mod: CPTII,S$GLB,, | Performed by: OTOLARYNGOLOGY

## 2023-09-14 PROCEDURE — 4010F PR ACE/ARB THEARPY RXD/TAKEN: ICD-10-PCS | Mod: CPTII,S$GLB,, | Performed by: OTOLARYNGOLOGY

## 2023-09-14 PROCEDURE — 1100F PR PT FALLS ASSESS DOC 2+ FALLS/FALL W/INJURY/YR: ICD-10-PCS | Mod: CPTII,S$GLB,, | Performed by: OTOLARYNGOLOGY

## 2023-09-14 PROCEDURE — 31575 DIAGNOSTIC LARYNGOSCOPY: CPT | Mod: S$GLB,,, | Performed by: OTOLARYNGOLOGY

## 2023-09-14 PROCEDURE — 3008F PR BODY MASS INDEX (BMI) DOCUMENTED: ICD-10-PCS | Mod: CPTII,S$GLB,, | Performed by: OTOLARYNGOLOGY

## 2023-09-14 PROCEDURE — 1100F PTFALLS ASSESS-DOCD GE2>/YR: CPT | Mod: CPTII,S$GLB,, | Performed by: OTOLARYNGOLOGY

## 2023-09-14 PROCEDURE — 3044F PR MOST RECENT HEMOGLOBIN A1C LEVEL <7.0%: ICD-10-PCS | Mod: CPTII,S$GLB,, | Performed by: OTOLARYNGOLOGY

## 2023-09-14 PROCEDURE — 3008F BODY MASS INDEX DOCD: CPT | Mod: CPTII,S$GLB,, | Performed by: OTOLARYNGOLOGY

## 2023-09-14 PROCEDURE — 3288F FALL RISK ASSESSMENT DOCD: CPT | Mod: CPTII,S$GLB,, | Performed by: OTOLARYNGOLOGY

## 2023-09-14 PROCEDURE — 4010F ACE/ARB THERAPY RXD/TAKEN: CPT | Mod: CPTII,S$GLB,, | Performed by: OTOLARYNGOLOGY

## 2023-09-14 PROCEDURE — 99205 OFFICE O/P NEW HI 60 MIN: CPT | Mod: 25,S$GLB,, | Performed by: OTOLARYNGOLOGY

## 2023-09-14 PROCEDURE — 1125F PR PAIN SEVERITY QUANTIFIED, PAIN PRESENT: ICD-10-PCS | Mod: CPTII,S$GLB,, | Performed by: OTOLARYNGOLOGY

## 2023-09-14 PROCEDURE — 31575 PR LARYNGOSCOPY, FLEXIBLE; DIAGNOSTIC: ICD-10-PCS | Mod: S$GLB,,, | Performed by: OTOLARYNGOLOGY

## 2023-09-14 PROCEDURE — 3044F HG A1C LEVEL LT 7.0%: CPT | Mod: CPTII,S$GLB,, | Performed by: OTOLARYNGOLOGY

## 2023-09-14 PROCEDURE — 99205 PR OFFICE/OUTPT VISIT, NEW, LEVL V, 60-74 MIN: ICD-10-PCS | Mod: 25,S$GLB,, | Performed by: OTOLARYNGOLOGY

## 2023-09-14 PROCEDURE — 3288F PR FALLS RISK ASSESSMENT DOCUMENTED: ICD-10-PCS | Mod: CPTII,S$GLB,, | Performed by: OTOLARYNGOLOGY

## 2023-09-14 RX ORDER — SODIUM CHLORIDE 0.9 % (FLUSH) 0.9 %
10 SYRINGE (ML) INJECTION
Status: DISCONTINUED | OUTPATIENT
Start: 2023-09-14 | End: 2023-09-26 | Stop reason: CLARIF

## 2023-09-14 RX ORDER — SODIUM CHLORIDE 0.9 % (FLUSH) 0.9 %
10 SYRINGE (ML) INJECTION
Status: CANCELLED | OUTPATIENT
Start: 2023-09-14

## 2023-09-14 RX ORDER — IBUPROFEN 800 MG/1
800 TABLET ORAL EVERY 6 HOURS PRN
Qty: 20 TABLET | Refills: 1 | Status: SHIPPED | OUTPATIENT
Start: 2023-09-14 | End: 2024-03-19

## 2023-09-14 RX ORDER — LIDOCAINE HYDROCHLORIDE 10 MG/ML
1 INJECTION, SOLUTION EPIDURAL; INFILTRATION; INTRACAUDAL; PERINEURAL ONCE
Status: CANCELLED | OUTPATIENT
Start: 2023-09-14 | End: 2023-09-14

## 2023-09-14 RX ORDER — CLINDAMYCIN HYDROCHLORIDE 300 MG/1
300 CAPSULE ORAL 3 TIMES DAILY
Qty: 30 CAPSULE | Refills: 0 | Status: SHIPPED | OUTPATIENT
Start: 2023-09-14 | End: 2023-09-24

## 2023-09-14 NOTE — PROGRESS NOTES
OTOLARYNGOLOGY CLINIC NOTE  Date:  2023     Chief complaint:  Chief Complaint   Patient presents with    Neck Swelling     Neck mass for 2 weeks as per patient with pain       History of Present Illness  Chanel Esparza is a 67 y.o. female  presenting today for a new evaluation and treatment of parotid  mass.     Noted a lump in the spring and then around labor day started hurting a lot more and more swelling took doxycyline  Throat pain for 2 days  No voice changes; hard to swallow ; pain is on right side of throat  Given augmentin and prednisone and pain still a 10/10    +smoker        Past Medical History  Past Medical History:   Diagnosis Date    Asthma     Emphysema of lung     Hypertension         Past Surgical History  Past Surgical History:   Procedure Laterality Date     SECTION      CHOLECYSTECTOMY      HYSTERECTOMY      CAPRICE        Medications  Current Outpatient Medications on File Prior to Visit   Medication Sig Dispense Refill    albuterol (PROVENTIL/VENTOLIN HFA) 90 mcg/actuation inhaler INHALE 2 PUFF INTO LUNGS EVERY 6 HOURS AS NEEDED FOR WHEEZING 25.5 g 3    albuterol-ipratropium (DUO-NEB) 2.5 mg-0.5 mg/3 mL nebulizer solution Take 3 mLs by nebulization every 6 (six) hours as needed for Wheezing or Shortness of Breath. Rescue 60 mL 5    amoxicillin-clavulanate 875-125mg (AUGMENTIN) 875-125 mg per tablet Take 1 tablet by mouth 2 (two) times daily. 14 tablet 0    aspirin (ECOTRIN) 81 MG EC tablet Take 81 mg by mouth once daily.      CETIRIZINE HCL (ZYRTEC ORAL) Take by mouth.      clotrimazole (LOTRIMIN) 1 % cream Apply topically 2 (two) times daily. For 14 days 60 g 0    fluticasone-umeclidin-vilanter (TRELEGY ELLIPTA) 100-62.5-25 mcg DsDv Inhale 1 puff into the lungs once daily. 180 each 3    latanoprost 0.005 % ophthalmic solution INT 1 GTT INTO OU HS  0    lisinopriL-hydrochlorothiazide (PRINZIDE,ZESTORETIC) 20-25 mg Tab Take 1 tablet by mouth once daily. 90 tablet 3    nystatin  (MYCOSTATIN) powder Apply topically once daily. (Patient not taking: Reported on 9/6/2023) 60 g 3    tiZANidine (ZANAFLEX) 4 MG tablet Take 1 tablet (4 mg total) by mouth every 6 (six) hours as needed (jaw pain). 40 tablet 0     No current facility-administered medications on file prior to visit.       Review of Systems  Review of Systems   Constitutional: Negative.    Eyes: Negative.    Respiratory: Negative.     Cardiovascular: Negative.    Gastrointestinal: Negative.    Genitourinary: Negative.    Musculoskeletal: Negative.    Skin: Negative.    Neurological:  Positive for headaches.   Psychiatric/Behavioral: Negative.        Answers submitted by the patient for this visit:  Review of Symptoms Questionnaire  (Submitted on 9/14/2023)  facial swelling: Yes  swollen glands: Yes  Social History   reports that she has been smoking cigarettes. She has never used smokeless tobacco. She reports that she does not drink alcohol and does not use drugs.     Family History  Family History   Problem Relation Age of Onset    Hypertension Mother         Physical Exam   There were no vitals filed for this visit. There is no height or weight on file to calculate BMI.            GENERAL: no acute distress.  HEAD: normocephalic.   EYES: lids and lashes normal. No scleral icterus  EARS: external ear without lesion, normal pinna shape and position.  External auditory canal with normal cerumen, tympanic membrane fully visible, no perforation , no retraction. No middle ear effusion. Ossicles intact.   NOSE: external nose without significant bony abnormality  ORAL CAVITY/OROPHARYNX: tongue midline and mobile. Symmetric palate rise. Uvula midline.   NECK: trachea midline. Large parotid mass of right side with skin erythema and ttp . No ballotable areas  LYMPH NODES:No cervical lymphadenopathy otherwise.  RESPIRATORY: no stridor, no stertor. Voice normal. Respirations nonlabored.  NEURO: alert, responds to questions appropriately.    PSYCH:mood appropriate      PROCEDURE NOTE  NAME OF PROCEDURE: Flexible Laryngoscopy, diagnostic  INDICATIONS: gag reflex precludes mirror exam, throat pain in adult   FINDINGS: Base of tongue asymmetry  on left vs mass; no overt ulceration but appeared to have mucosal changes -white/yellow color    Consent: After procedure was explained in detail and all questions answered, verbal consent was obtained for performing flexible laryngoscopy.  Anesthesia: topical 4% lidocaine and neosynephrine  Procedure: With patient in seated position, the scope was inserted into the bilateral nasal passageway and advanced atraumatically into the nasopharynx to examine the following structures:    Nasopharynx: no mass or lesion noted in nasopharynx.   Oropharynx: Base of tongue asymmetry  on left vs mass; no overt ulceration but appeared to have mucosal changes -white/yellow color  Hypopharynx: posterior pharyngeal wall without mass or lesion. No pooling of secretions. Pyriform sinuses visible without mass or lesion  Larynx: epiglottis normal without lesion. False vocal folds without edema/erythema/lesion. True vocal folds mobile and without lesion. no interarytenoid edema no erythema . Postcricoid region without edema no lesion   Subglottis: visualized portion of subglottis normal in appearance    After examination performed, the scope was removed atraumatically . The patient tolerated the procedure well. Photodocumentation obtained with representative images below, all images and/or videos uploaded in media section of epic.                    Imaging:  The patient does have any pertinent and/or recent imaging of the head and neck. CT head images and report reviewed. Incomplete imaging of neck but right parotid mass with both solid and hypoechoic/cystic component with slight inflammation of parotid fascia/subcutaneous tissue suggestive of possible cellulitis       Labs:  CBC  Recent Labs   Lab 01/10/23  0753 05/15/23  0754  08/30/23  0828   WBC 10.14 7.91 7.22   Hemoglobin 15.4 14.9 15.1   Hematocrit 49.6 H 47.3 47.3   MCV 95 93 93   Platelets 226 214 191     BMP  Recent Labs   Lab 01/10/23  0753 05/15/23  0754 08/30/23  0828   Glucose 105 105 98   Sodium 139 140 141   Potassium 4.5 4.5 4.1   Chloride 102 102 105   CO2 29 31 H 26   BUN 20 14 17   Creatinine 0.9 0.9 0.9   Calcium 10.4 9.8 9.4     COAGS        Assessment  1. Mass of right parotid gland  - Ambulatory referral/consult to ENT  - CT Soft Tissue Neck With Contrast; Future  - Vital signs; Standing  - Diet NPO; Standing  - Case Request Operating Room: EXCISION, PAROTID GLAND  - Place in Outpatient; Standing  - Place MACHELLE hose; Standing  - X-Ray Chest PA And Lateral; Standing  - Diet NPO  - US FNA Biopsy w/ Imaging 1st Lesion; Future    2. Oropharyngeal lesion    3. Throat pain in adult       Plan:  Discussed plan of care with patient in detail and all questions answered. Patient reported understanding of plan of care. I gave the patient the opportunity to ask questions and patient confirmed all questions answered to satisfaction.       Need ct neck to evaluate extent of tumor and evaluate cervical lymph nodes for surgical planning - possible infected warthin's tumor with associated node vs malignancy vs met  No response to augmentin will try clinda  Not on steroids currently  Recent onset throat pain- has asymmetry of base of tongue and appearance concerning for possible mass on LEFT approaching midline. Parotid disease on RIGHT; will eval further with dedicated ct neck and plan for DL biopsy   FNA of parotid mass ordered     Regarding parotid surgery  We discussed that the patient will need a drain postop to prevent sialocele and will need to be monitored overnight for 23 hours postoperatively. Risks/benefits/indications explained in detail including but not limited to laura's syndrome, facial nerve paralysis ( temporary and permanent), first bite syndrome, wound dehiscence, ear  numbness and need for further procedures. Recommend operative direct laryngoscopy with microscopy and biopsy for further evaluation. We discussed risks/benefits/alternatives/indications including but not limited to , gingival injury, dental injury, tongue laceration, tongue numbness, bleeding, pain , nondiagnostic biopsy and possible need for further treatment.   Dl biopsy and right parotidectomy tentatively booked for 10-3-23 ; FNA prior Stop aspirin 2 weeks before surgery     Do not want to delay surgery for FNA but if still not improving on abx may help to aspirate also if infected      F/u postop    I spent a total of 60 minutes on the day of the visit.  This includes face to face time and non-face to face time preparing to see the patient (eg, review of tests), obtaining and/or reviewing separately obtained history, documenting clinical information in the electronic or other health record, independently interpreting results and communicating results to the patient/family/caregiver, or care coordinator.    Please be aware that this note has been generated with the assistance of Carlos Alberto voice-to-text.  Please excuse any spelling or grammatical errors.

## 2023-09-18 ENCOUNTER — HOSPITAL ENCOUNTER (OUTPATIENT)
Dept: RADIOLOGY | Facility: HOSPITAL | Age: 68
Discharge: HOME | End: 2023-09-18
Attending: OTOLARYNGOLOGY
Payer: MEDICARE

## 2023-09-18 DIAGNOSIS — K11.8 MASS OF RIGHT PAROTID GLAND: ICD-10-CM

## 2023-09-18 PROCEDURE — 25500020 PHARM REV CODE 255: Performed by: OTOLARYNGOLOGY

## 2023-09-18 PROCEDURE — 70491 CT SOFT TISSUE NECK W/DYE: CPT | Mod: TC

## 2023-09-18 RX ADMIN — IOHEXOL 75 ML: 350 INJECTION, SOLUTION INTRAVENOUS at 04:09

## 2023-09-20 ENCOUNTER — PATIENT MESSAGE (OUTPATIENT)
Dept: SURGERY | Facility: HOSPITAL | Age: 68
End: 2023-09-20
Payer: MEDICARE

## 2023-09-23 ENCOUNTER — HOSPITAL ENCOUNTER (EMERGENCY)
Facility: HOSPITAL | Age: 68
Discharge: HOME | End: 2023-09-23
Attending: EMERGENCY MEDICINE
Payer: MEDICARE

## 2023-09-23 ENCOUNTER — PATIENT MESSAGE (OUTPATIENT)
Dept: SURGERY | Facility: HOSPITAL | Age: 68
End: 2023-09-23
Payer: MEDICARE

## 2023-09-23 VITALS
HEART RATE: 91 BPM | RESPIRATION RATE: 20 BRPM | DIASTOLIC BLOOD PRESSURE: 88 MMHG | OXYGEN SATURATION: 97 % | WEIGHT: 185 LBS | BODY MASS INDEX: 36.32 KG/M2 | TEMPERATURE: 97 F | HEIGHT: 60 IN | SYSTOLIC BLOOD PRESSURE: 193 MMHG

## 2023-09-23 DIAGNOSIS — D49.2 TUMOR OF SOFT TISSUE OF NECK: Primary | ICD-10-CM

## 2023-09-23 PROCEDURE — 99283 EMERGENCY DEPT VISIT LOW MDM: CPT

## 2023-09-23 RX ORDER — HYDROCODONE BITARTRATE AND ACETAMINOPHEN 7.5; 325 MG/1; MG/1
1 TABLET ORAL EVERY 6 HOURS PRN
Qty: 18 TABLET | Refills: 0 | Status: SHIPPED | OUTPATIENT
Start: 2023-09-23 | End: 2023-09-26 | Stop reason: CLARIF

## 2023-09-23 NOTE — DISCHARGE INSTRUCTIONS
Tylenol 1000 mg by mouth every 8 hours as needed for pain, or Tylenol with hydrocodone as directed.  You may continue ibuprofen.  Please follow-up with your ENT doctor this week.  Immediately if you get worse or if new problems develop.

## 2023-09-23 NOTE — ED TRIAGE NOTES
Pt has abscess to right neck that spontaneously opened up last night and began draining. Pt has surgery scheduled in about 10 days. Pt is in pain and uncomfortable. Pt has HTN. Denies chest pain, SOB, N/V/D.

## 2023-09-23 NOTE — ED PROVIDER NOTES
Encounter Date: 2023    SCRIBE #1 NOTE: I, Roverto Cueto, am scribing for, and in the presence of,  Sean Ly MD. I have scribed the following portions of the note - Other sections scribed: HPI, ROS.       History     Chief Complaint   Patient presents with    Abscess     Pt to ER with known abscess to right gland. Pt due to have surgery to gave gland removed but noticed today the area started draining. Pt concerned and requesting evaluation. Pt reports pain has decreased since draining. No fever noted. PT currently on abx      Chanel Esparza is a 68 y.o. female, with a past medical history of HTN, who presents to the ED with abscess. Patient Patient states that this morning at 5:30 am her abscess on the right side of her neck began to have pain and drainage. Patient endorses that she will be having her surgery for it on . Patient reports being on antibiotics for about 10 days. Patient reports having some diarrhea. No other exacerbating or alleviating factors.  Patient denies cough, shortness of breath, chest pain, fever, chills, abdominal pain, nausea, vomiting,  dysuria, headaches, congestion, sore throat, arm or leg trouble, eye pain, ear pain, rash, or other associated symptoms. Patient endorses that she smokes.         The history is provided by the patient. No  was used.     Review of patient's allergies indicates:  No Known Allergies  Past Medical History:   Diagnosis Date    Asthma     Emphysema of lung     Hypertension      Past Surgical History:   Procedure Laterality Date     SECTION      CHOLECYSTECTOMY      HYSTERECTOMY      CAPRICE     Family History   Problem Relation Age of Onset    Hypertension Mother      Social History     Tobacco Use    Smoking status: Every Day     Current packs/day: 0.00     Types: Cigarettes     Last attempt to quit: 3/5/2018     Years since quittin.5    Smokeless tobacco: Never   Substance Use Topics    Alcohol use: No      Alcohol/week: 0.0 standard drinks of alcohol    Drug use: No     Review of Systems   Constitutional:  Negative for chills, diaphoresis and fever.   HENT:  Negative for ear pain and sore throat.    Eyes:  Negative for pain.   Respiratory:  Negative for cough and shortness of breath.    Cardiovascular:  Negative for chest pain.   Gastrointestinal:  Negative for abdominal pain, diarrhea, nausea and vomiting.   Genitourinary:  Negative for dysuria.   Musculoskeletal:  Negative for back pain.        (-) Arm or leg trouble.    Skin:  Positive for wound. Negative for rash.   Neurological:  Negative for headaches.   Psychiatric/Behavioral:  Negative for confusion.        Physical Exam     Initial Vitals [09/23/23 0823]   BP Pulse Resp Temp SpO2   (!) 193/88 91 20 97.4 °F (36.3 °C) 97 %      MAP       --         Physical Exam    ED Course   Procedures  Labs Reviewed - No data to display       Imaging Results    None          Medications - No data to display  Medical Decision Making  Risk  Prescription drug management.            Scribe Attestation:   Scribe #1: I performed the above scribed service and the documentation accurately describes the services I performed. I attest to the accuracy of the note.                   Medical Decision Making:   History:   Old Medical Records: I decided to obtain old medical records.  Old Records Summarized: other records.    I, ***, personally performed the services described in this documentation. All medical record entries made by the scribe were at my direction and in my presence. I have reviewed the chart and agree that the record reflects my personal performance and is accurate and complete.    Clinical Impression:   Final diagnoses:  [D49.2] Tumor of soft tissue of neck (Primary)        ED Disposition Condition    Discharge Stable          ED Prescriptions       Medication Sig Dispense Start Date End Date Auth. Provider    HYDROcodone-acetaminophen (NORCO) 7.5-325 mg per tablet Take  1 tablet by mouth every 6 (six) hours as needed for Pain. 18 tablet 9/23/2023 -- Sean Ly MD          Follow-up Information       Follow up With Specialties Details Why Contact Info    Lyudmila Barrera MD Otolaryngology In 2 days Call Monday morning for an appointment [address]  [place] [id number] [phone]

## 2023-09-23 NOTE — ED PROVIDER NOTES
Encounter Date: 2023       History     Chief Complaint   Patient presents with    Abscess     Pt to ER with known abscess to right gland. Pt due to have surgery to gave gland removed but noticed today the area started draining. Pt concerned and requesting evaluation. Pt reports pain has decreased since draining. No fever noted. PT currently on abx      HPI  Review of patient's allergies indicates:  No Known Allergies  Past Medical History:   Diagnosis Date    Asthma     Emphysema of lung     Hypertension      Past Surgical History:   Procedure Laterality Date     SECTION      CHOLECYSTECTOMY      HYSTERECTOMY      CAPRICE     Family History   Problem Relation Age of Onset    Hypertension Mother      Social History     Tobacco Use    Smoking status: Every Day     Current packs/day: 0.00     Types: Cigarettes     Last attempt to quit: 3/5/2018     Years since quittin.5    Smokeless tobacco: Never   Substance Use Topics    Alcohol use: No     Alcohol/week: 0.0 standard drinks of alcohol    Drug use: No     Review of Systems    Physical Exam     Initial Vitals [23 0823]   BP Pulse Resp Temp SpO2   (!) 193/88 91 20 97.4 °F (36.3 °C) 97 %      MAP       --         Physical Exam  The patient was examined specifically for the following:   General:No significant distress, Good color, Warm and dry. Head and neck:Scalp atraumatic, Neck supple. Neurological:Appropriate conversation, Gross motor deficits. Eyes:Conjugate gaze, Clear corneas. ENT: No epistaxis. Cardiac: Regular rate and rhythm, Grossly normal heart tones. Pulmonary: Wheezing, Rales. Gastrointestinal: Abdominal tenderness, Abdominal distention. Musculoskeletal: Extremity deformity, Apparent pain with range of motion of the joints. Skin: Rash.   The findings on examination were normal except for the following:  The patient's blood pressure is 193/88.  The patient has some mild wheezing.  She is afebrile.  5 cm mass just inferior to the right ear.   There is a central skin defect that looks raises and scarred.  I feel no fluctuance.  There is no significant surrounding induration or erythema.  ED Course   Procedures  Labs Reviewed - No data to display       Imaging Results    None          Medications - No data to display  Medical Decision Making  Given the above this patient has a soft tissue tumor just inferior to the right ear.  She has pain.  She had some drainage last night.  She is on antibiotics.  She is followed by ENT, Dr. Barrera, she is not on narcotic pain medicine.  She is taking ibuprofen.  I will switch her to hydrocodone.  I will have her follow up with ENT on Monday.  There is no fever or signs of systemic infection.  The patient does have some borderline uncontrolled hypertension.  I believe she has COPD and that her wheezing is chronic.                            Clinical Impression:   Final diagnoses:  [D49.2] Tumor of soft tissue of neck (Primary)        ED Disposition Condition    Discharge Stable          ED Prescriptions       Medication Sig Dispense Start Date End Date Auth. Provider    HYDROcodone-acetaminophen (NORCO) 7.5-325 mg per tablet Take 1 tablet by mouth every 6 (six) hours as needed for Pain. 18 tablet 9/23/2023 -- Sean Ly MD          Follow-up Information       Follow up With Specialties Details Why Contact Info    Lyudmila Barrera MD Otolaryngology In 2 days Call Monday morning for an appointment [address] [place] [id number] [phone]               Sean Ly MD  09/23/23 7248

## 2023-09-25 ENCOUNTER — TELEPHONE (OUTPATIENT)
Dept: OTOLARYNGOLOGY | Facility: CLINIC | Age: 68
End: 2023-09-25
Payer: MEDICARE

## 2023-09-25 ENCOUNTER — PATIENT MESSAGE (OUTPATIENT)
Dept: SURGERY | Facility: HOSPITAL | Age: 68
End: 2023-09-25
Payer: MEDICARE

## 2023-09-25 NOTE — TELEPHONE ENCOUNTER
----- Message from Arnulfo Tovar sent at 9/25/2023  8:49 AM CDT -----  Regarding: pt called  Type: Patient Call Back         Who called: LONG REAVES [[id number]]          What is the request in detail: Pt states under her earlobe is draining, she is wondering if she should be seen before the procedure. Pt did attach a photo of it. Please call         Can the [facility] reply by [facility]? No         Would the patient rather a call back or a response via [facility]? Call back         Best call back number: Telephone Information:  Mobile          [phone]             Additional Information:        [phone]             Thank you.

## 2023-09-25 NOTE — TELEPHONE ENCOUNTER
Spoke with Dr Montoya, she viewed images. Looks as the tumor is trying to protrude through the skin as per Dr. Montoya, does not look like any infection. Advise patient not to touch it and can keep clean with dial soap. Will check the area 9/27 for 315 once dr montoya is out of surgery. Advised to keep us updated with any changes.

## 2023-09-26 ENCOUNTER — HOSPITAL ENCOUNTER (OUTPATIENT)
Dept: PREADMISSION TESTING | Facility: HOSPITAL | Age: 68
Discharge: HOME | End: 2023-09-26
Attending: OTOLARYNGOLOGY
Payer: MEDICARE

## 2023-09-26 ENCOUNTER — OFFICE VISIT (OUTPATIENT)
Dept: OTOLARYNGOLOGY | Facility: CLINIC | Age: 68
End: 2023-09-26
Payer: MEDICARE

## 2023-09-26 ENCOUNTER — HOSPITAL ENCOUNTER (OUTPATIENT)
Dept: RADIOLOGY | Facility: HOSPITAL | Age: 68
Discharge: HOME | End: 2023-09-26
Attending: OTOLARYNGOLOGY
Payer: MEDICARE

## 2023-09-26 VITALS
SYSTOLIC BLOOD PRESSURE: 206 MMHG | OXYGEN SATURATION: 93 % | BODY MASS INDEX: 35.84 KG/M2 | DIASTOLIC BLOOD PRESSURE: 91 MMHG | WEIGHT: 182.56 LBS | TEMPERATURE: 96 F | HEIGHT: 60 IN | RESPIRATION RATE: 18 BRPM

## 2023-09-26 VITALS
DIASTOLIC BLOOD PRESSURE: 86 MMHG | HEIGHT: 60 IN | WEIGHT: 182 LBS | BODY MASS INDEX: 35.73 KG/M2 | SYSTOLIC BLOOD PRESSURE: 188 MMHG

## 2023-09-26 VITALS — SYSTOLIC BLOOD PRESSURE: 187 MMHG | DIASTOLIC BLOOD PRESSURE: 89 MMHG

## 2023-09-26 DIAGNOSIS — J39.2 OROPHARYNGEAL LESION: ICD-10-CM

## 2023-09-26 DIAGNOSIS — K11.8 MASS OF RIGHT PAROTID GLAND: ICD-10-CM

## 2023-09-26 DIAGNOSIS — Z01.818 PREOP TESTING: Primary | ICD-10-CM

## 2023-09-26 DIAGNOSIS — K11.8 MASS OF RIGHT PAROTID GLAND: Primary | ICD-10-CM

## 2023-09-26 LAB
ALBUMIN SERPL BCP-MCNC: 3.3 G/DL (ref 3.5–5.2)
ALP SERPL-CCNC: 64 U/L (ref 55–135)
ALT SERPL W/O P-5'-P-CCNC: 34 U/L (ref 10–44)
ANION GAP SERPL CALC-SCNC: 13 MMOL/L (ref 8–16)
AST SERPL-CCNC: 22 U/L (ref 10–40)
BASOPHILS # BLD AUTO: 0.04 K/UL (ref 0–0.2)
BASOPHILS NFR BLD: 0.4 % (ref 0–1.9)
BILIRUB SERPL-MCNC: 0.8 MG/DL (ref 0.1–1)
BUN SERPL-MCNC: 14 MG/DL (ref 8–23)
CALCIUM SERPL-MCNC: 9.8 MG/DL (ref 8.7–10.5)
CHLORIDE SERPL-SCNC: 101 MMOL/L (ref 95–110)
CO2 SERPL-SCNC: 27 MMOL/L (ref 23–29)
CREAT SERPL-MCNC: 0.8 MG/DL (ref 0.5–1.4)
DIFFERENTIAL METHOD: ABNORMAL
EOSINOPHIL # BLD AUTO: 0.1 K/UL (ref 0–0.5)
EOSINOPHIL NFR BLD: 1 % (ref 0–8)
ERYTHROCYTE [DISTWIDTH] IN BLOOD BY AUTOMATED COUNT: 13.2 % (ref 11.5–14.5)
EST. GFR  (NO RACE VARIABLE): >60 ML/MIN/1.73 M^2
GLUCOSE SERPL-MCNC: 90 MG/DL (ref 70–110)
HCT VFR BLD AUTO: 43.6 % (ref 37–48.5)
HGB BLD-MCNC: 14.5 G/DL (ref 12–16)
IMM GRANULOCYTES # BLD AUTO: 0.04 K/UL (ref 0–0.04)
IMM GRANULOCYTES NFR BLD AUTO: 0.4 % (ref 0–0.5)
LYMPHOCYTES # BLD AUTO: 1.3 K/UL (ref 1–4.8)
LYMPHOCYTES NFR BLD: 12.6 % (ref 18–48)
MCH RBC QN AUTO: 29.9 PG (ref 27–31)
MCHC RBC AUTO-ENTMCNC: 33.3 G/DL (ref 32–36)
MCV RBC AUTO: 90 FL (ref 82–98)
MONOCYTES # BLD AUTO: 1 K/UL (ref 0.3–1)
MONOCYTES NFR BLD: 9 % (ref 4–15)
NEUTROPHILS # BLD AUTO: 8.1 K/UL (ref 1.8–7.7)
NEUTROPHILS NFR BLD: 76.6 % (ref 38–73)
NRBC BLD-RTO: 0 /100 WBC
PLATELET # BLD AUTO: 212 K/UL (ref 150–450)
PMV BLD AUTO: 11.9 FL (ref 9.2–12.9)
POTASSIUM SERPL-SCNC: 4.1 MMOL/L (ref 3.5–5.1)
PROT SERPL-MCNC: 7.5 G/DL (ref 6–8.4)
RBC # BLD AUTO: 4.85 M/UL (ref 4–5.4)
SODIUM SERPL-SCNC: 141 MMOL/L (ref 136–145)
WBC # BLD AUTO: 10.63 K/UL (ref 3.9–12.7)

## 2023-09-26 PROCEDURE — 93010 ELECTROCARDIOGRAM REPORT: CPT | Mod: ,,, | Performed by: INTERNAL MEDICINE

## 2023-09-26 PROCEDURE — 71046 X-RAY EXAM CHEST 2 VIEWS: CPT | Mod: 26,,, | Performed by: RADIOLOGY

## 2023-09-26 PROCEDURE — 99215 PR OFFICE/OUTPT VISIT, EST, LEVL V, 40-54 MIN: ICD-10-PCS | Mod: S$GLB,,, | Performed by: OTOLARYNGOLOGY

## 2023-09-26 PROCEDURE — 3008F PR BODY MASS INDEX (BMI) DOCUMENTED: ICD-10-PCS | Mod: CPTII,S$GLB,, | Performed by: OTOLARYNGOLOGY

## 2023-09-26 PROCEDURE — 3077F PR MOST RECENT SYSTOLIC BLOOD PRESSURE >= 140 MM HG: ICD-10-PCS | Mod: CPTII,S$GLB,, | Performed by: OTOLARYNGOLOGY

## 2023-09-26 PROCEDURE — 1126F AMNT PAIN NOTED NONE PRSNT: CPT | Mod: CPTII,S$GLB,, | Performed by: OTOLARYNGOLOGY

## 2023-09-26 PROCEDURE — 93010 EKG 12-LEAD: ICD-10-PCS | Mod: ,,, | Performed by: INTERNAL MEDICINE

## 2023-09-26 PROCEDURE — 4010F PR ACE/ARB THEARPY RXD/TAKEN: ICD-10-PCS | Mod: CPTII,S$GLB,, | Performed by: OTOLARYNGOLOGY

## 2023-09-26 PROCEDURE — 3008F BODY MASS INDEX DOCD: CPT | Mod: CPTII,S$GLB,, | Performed by: OTOLARYNGOLOGY

## 2023-09-26 PROCEDURE — 1101F PR PT FALLS ASSESS DOC 0-1 FALLS W/OUT INJ PAST YR: ICD-10-PCS | Mod: CPTII,S$GLB,, | Performed by: OTOLARYNGOLOGY

## 2023-09-26 PROCEDURE — 85025 COMPLETE CBC W/AUTO DIFF WBC: CPT | Performed by: OTOLARYNGOLOGY

## 2023-09-26 PROCEDURE — 71046 XR CHEST PA AND LATERAL PRE-OP: ICD-10-PCS | Mod: 26,,, | Performed by: RADIOLOGY

## 2023-09-26 PROCEDURE — 3079F DIAST BP 80-89 MM HG: CPT | Mod: CPTII,S$GLB,, | Performed by: OTOLARYNGOLOGY

## 2023-09-26 PROCEDURE — 1101F PT FALLS ASSESS-DOCD LE1/YR: CPT | Mod: CPTII,S$GLB,, | Performed by: OTOLARYNGOLOGY

## 2023-09-26 PROCEDURE — 71046 X-RAY EXAM CHEST 2 VIEWS: CPT | Mod: TC,FY

## 2023-09-26 PROCEDURE — 3288F PR FALLS RISK ASSESSMENT DOCUMENTED: ICD-10-PCS | Mod: CPTII,S$GLB,, | Performed by: OTOLARYNGOLOGY

## 2023-09-26 PROCEDURE — 3077F SYST BP >= 140 MM HG: CPT | Mod: CPTII,S$GLB,, | Performed by: OTOLARYNGOLOGY

## 2023-09-26 PROCEDURE — 80053 COMPREHEN METABOLIC PANEL: CPT | Performed by: OTOLARYNGOLOGY

## 2023-09-26 PROCEDURE — 3288F FALL RISK ASSESSMENT DOCD: CPT | Mod: CPTII,S$GLB,, | Performed by: OTOLARYNGOLOGY

## 2023-09-26 PROCEDURE — 3079F PR MOST RECENT DIASTOLIC BLOOD PRESSURE 80-89 MM HG: ICD-10-PCS | Mod: CPTII,S$GLB,, | Performed by: OTOLARYNGOLOGY

## 2023-09-26 PROCEDURE — 3044F HG A1C LEVEL LT 7.0%: CPT | Mod: CPTII,S$GLB,, | Performed by: OTOLARYNGOLOGY

## 2023-09-26 PROCEDURE — 93005 ELECTROCARDIOGRAM TRACING: CPT

## 2023-09-26 PROCEDURE — 4010F ACE/ARB THERAPY RXD/TAKEN: CPT | Mod: CPTII,S$GLB,, | Performed by: OTOLARYNGOLOGY

## 2023-09-26 PROCEDURE — 99215 OFFICE O/P EST HI 40 MIN: CPT | Mod: S$GLB,,, | Performed by: OTOLARYNGOLOGY

## 2023-09-26 PROCEDURE — 1126F PR PAIN SEVERITY QUANTIFIED, NO PAIN PRESENT: ICD-10-PCS | Mod: CPTII,S$GLB,, | Performed by: OTOLARYNGOLOGY

## 2023-09-26 PROCEDURE — 3044F PR MOST RECENT HEMOGLOBIN A1C LEVEL <7.0%: ICD-10-PCS | Mod: CPTII,S$GLB,, | Performed by: OTOLARYNGOLOGY

## 2023-09-26 RX ORDER — CLINDAMYCIN HYDROCHLORIDE 300 MG/1
300 CAPSULE ORAL 3 TIMES DAILY
Qty: 21 CAPSULE | Refills: 0 | Status: ON HOLD | OUTPATIENT
Start: 2023-09-26 | End: 2023-10-04 | Stop reason: HOSPADM

## 2023-09-26 NOTE — PROGRESS NOTES
OTOLARYNGOLOGY CLINIC NOTE  Date:  2023     Chief complaint:  Chief Complaint   Patient presents with    Neck Swelling     Right neck mass ozzing       History of Present Illness  Chanel Esparza is a 68 y.o. female  presenting today for a followup.    Completed antibiotic yesterday had been going down some on clinda compared to       Past Medical History  Past Medical History:   Diagnosis Date    Asthma     Emphysema of lung     Hypertension         Past Surgical History  Past Surgical History:   Procedure Laterality Date     SECTION      CHOLECYSTECTOMY      HYSTERECTOMY      CAPRICE        Medications  Current Outpatient Medications on File Prior to Visit   Medication Sig Dispense Refill    albuterol (PROVENTIL/VENTOLIN HFA) 90 mcg/actuation inhaler INHALE 2 PUFF INTO LUNGS EVERY 6 HOURS AS NEEDED FOR WHEEZING 25.5 g 3    albuterol-ipratropium (DUO-NEB) 2.5 mg-0.5 mg/3 mL nebulizer solution Take 3 mLs by nebulization every 6 (six) hours as needed for Wheezing or Shortness of Breath. Rescue 60 mL 5    aspirin (ECOTRIN) 81 MG EC tablet Take 81 mg by mouth once daily.      CETIRIZINE HCL (ZYRTEC ORAL) Take by mouth.      clotrimazole (LOTRIMIN) 1 % cream Apply topically 2 (two) times daily. For 14 days 60 g 0    fluticasone-umeclidin-vilanter (TRELEGY ELLIPTA) 100-62.5-25 mcg DsDv Inhale 1 puff into the lungs once daily. 180 each 3    ibuprofen (ADVIL,MOTRIN) 800 MG tablet Take 1 tablet (800 mg total) by mouth every 6 (six) hours as needed for Pain. 20 tablet 1    lisinopriL-hydrochlorothiazide (PRINZIDE,ZESTORETIC) 20-25 mg Tab Take 1 tablet by mouth once daily. 90 tablet 3    nystatin (MYCOSTATIN) powder Apply topically once daily. 60 g 3    tiZANidine (ZANAFLEX) 4 MG tablet Take 1 tablet (4 mg total) by mouth every 6 (six) hours as needed (jaw pain). 40 tablet 0    [DISCONTINUED] HYDROcodone-acetaminophen (NORCO) 7.5-325 mg per tablet Take 1 tablet by mouth every 6 (six) hours as needed for Pain. 18 tablet  0    [DISCONTINUED] latanoprost 0.005 % ophthalmic solution INT 1 GTT INTO OU HS  0     Current Facility-Administered Medications on File Prior to Visit   Medication Dose Route Frequency Provider Last Rate Last Admin    [DISCONTINUED] sodium chloride 0.9% flush 10 mL  10 mL Intravenous PRN Lyudmila Barrera MD           Review of Systems  Review of Systems   Constitutional: Negative.    HENT:  Positive for ear pain.    Eyes: Negative.    Respiratory: Negative.     Cardiovascular: Negative.    Gastrointestinal: Negative.    Genitourinary: Negative.    Musculoskeletal:  Positive for neck pain.   Skin: Negative.    Neurological:  Positive for headaches.   Psychiatric/Behavioral: Negative.          Social History   reports that she quit smoking about 5 years ago. Her smoking use included cigarettes. She has never used smokeless tobacco. She reports that she does not drink alcohol and does not use drugs.     Family History  Family History   Problem Relation Age of Onset    Hypertension Mother         Physical Exam   There were no vitals filed for this visit. There is no height or weight on file to calculate BMI.            GENERAL: no acute distress.  HEAD: normocephalic.   EYES: No scleral icterus  EARS: external ear without lesion, normal pinna shape and position.  External auditory canal with normal cerumen, tympanic membrane fully visible, no perforation , no retraction. No middle ear effusion. Ossicles intact.   NOSE: external nose without significant bony abnormality  ORAL CAVITY/OROPHARYNX: tongue mobile.   NECK: trachea midline. Parotid mass      LYMPH NODES:No cervical lymphadenopathy.  RESPIRATORY: no stridor, no stertor. Voice normal. Respirations nonlabored.  NEURO: alert, responds to questions appropriately.    PSYCH:mood appropriate      Imaging:  The patient does have any new imaging of the head and neck since last visit. Ct neck images and report personally reviewed.         Labs:  CBC  Recent Labs    Lab 05/15/23  0754 08/30/23  0828 09/26/23  1205   WBC 7.91 7.22 10.63   Hemoglobin 14.9 15.1 14.5   Hematocrit 47.3 47.3 43.6   MCV 93 93 90   Platelets 214 191 212     BMP  Recent Labs   Lab 05/15/23  0754 08/30/23  0828 09/26/23  1205   Glucose 105 98 90   Sodium 140 141 141   Potassium 4.5 4.1 4.1   Chloride 102 105 101   CO2 31 H 26 27   BUN 14 17 14   Creatinine 0.9 0.9 0.8   Calcium 9.8 9.4 9.8     COAGS        Assessment  1. Mass of right parotid gland    2. Oropharyngeal lesion       Plan:  Discussed plan of care with patient in detail and all questions answered. Patient reported understanding of plan of care. I gave the patient the opportunity to ask questions and patient confirmed all questions answered to satisfaction.     Area on neck from initial photo sent in concerning for tumor but I think was crusting. Today looks more like infection trying to break through skin. Need fna of deeper more solid portion and should still not needle through the open area in case of malignancy. Discussed with aptient and son that if cancer would need larger operation to resect area of skin with normal margin. Will need to resect some skin so that there is not spillage of contents. High liklihood of nerve weakness given proximity to skull base and recent inflammation infection, continue abx until surgery  May need to push back surgery date if dont have fna results   Son is EMT , here today from [place] with her    Reached out to anesthesia NP about recent ekg and if additional workup needed  Will follow up in [facility] Friday about that and if any results are back. Confirmed that they can message through my chart  F/u postop    I spent a total of 40 minutes on the day of the visit.  This includes face to face time and non-face to face time preparing to see the patient (eg, review of tests), obtaining and/or reviewing separately obtained history, documenting clinical information in the electronic or other health record,  independently interpreting results and communicating results to the patient/family/caregiver, or care coordinator.   Please be aware that this note has been generated with the assistance of MModal voice-to-text.  Please excuse any spelling or grammatical errors.

## 2023-09-26 NOTE — ANESTHESIA PREPROCEDURE EVALUATION
2023  Chanel Esparza is a 68 y.o., female scheduled for  EXCISION, PAROTID GLAND (Right: Face) on 10/3/2023.    Past Medical History:   Diagnosis Date    Asthma     Emphysema of lung     Hypertension        Past Surgical History:   Procedure Laterality Date     SECTION      CHOLECYSTECTOMY      HYSTERECTOMY      CAPRICE           Pre-op Assessment    I have reviewed the Patient Summary Reports.     I have reviewed the Nursing Notes. I have reviewed the NPO Status.   I have reviewed the Medications.     Review of Systems  Anesthesia Hx:  No problems with previous Anesthesia  Denies Family Hx of Anesthesia complications.   Denies Personal Hx of Anesthesia complications.   Social:  No Alcohol Use, Smoker    Hematology/Oncology:     Oncology Normal   Hematology Comments: Unprovoked PE ()   Cardiovascular:   Exercise tolerance: good Hypertension  Functional Capacity good / => 4 METS    Pulmonary:   COPD Asthma Denies Sleep Apnea.    Renal/:  Renal/ Normal     Hepatic/GI:  Hepatic/GI Normal    Musculoskeletal:  Musculoskeletal Normal    Neurological:  Neurology Normal    Endocrine:  Endocrine Normal    Dermatological:  Skin Normal    Psych:  Psychiatric Normal           Physical Exam  General: Well nourished, Cooperative, Alert and Oriented    Airway:  Mallampati: II   Mouth Opening: Normal  TM Distance: Normal  Tongue: Normal    Dental:  Intact    Chest/Lungs:  Normal Respiratory Rate    Heart:  Rate: Normal  Rhythm: Regular Rhythm        Anesthesia Plan  Type of Anesthesia, risks & benefits discussed:    Anesthesia Type: Gen ETT  Intra-op Monitoring Plan: Standard ASA Monitors  Induction:  IV  Informed Consent: Informed consent signed with the Patient and all parties understand the risks and agree with anesthesia plan.  All questions answered.   ASA Score: 3    Ready For Surgery From Anesthesia  Perspective.     .

## 2023-09-26 NOTE — H&P (VIEW-ONLY)
OTOLARYNGOLOGY CLINIC NOTE  Date:  2023     Chief complaint:  Chief Complaint   Patient presents with    Neck Swelling     Right neck mass ozzing       History of Present Illness  Chanel Esparza is a 68 y.o. female  presenting today for a followup.    Completed antibiotic yesterday had been going down some on clinda compared to       Past Medical History  Past Medical History:   Diagnosis Date    Asthma     Emphysema of lung     Hypertension         Past Surgical History  Past Surgical History:   Procedure Laterality Date     SECTION      CHOLECYSTECTOMY      HYSTERECTOMY      CAPRICE        Medications  Current Outpatient Medications on File Prior to Visit   Medication Sig Dispense Refill    albuterol (PROVENTIL/VENTOLIN HFA) 90 mcg/actuation inhaler INHALE 2 PUFF INTO LUNGS EVERY 6 HOURS AS NEEDED FOR WHEEZING 25.5 g 3    albuterol-ipratropium (DUO-NEB) 2.5 mg-0.5 mg/3 mL nebulizer solution Take 3 mLs by nebulization every 6 (six) hours as needed for Wheezing or Shortness of Breath. Rescue 60 mL 5    aspirin (ECOTRIN) 81 MG EC tablet Take 81 mg by mouth once daily.      CETIRIZINE HCL (ZYRTEC ORAL) Take by mouth.      clotrimazole (LOTRIMIN) 1 % cream Apply topically 2 (two) times daily. For 14 days 60 g 0    fluticasone-umeclidin-vilanter (TRELEGY ELLIPTA) 100-62.5-25 mcg DsDv Inhale 1 puff into the lungs once daily. 180 each 3    ibuprofen (ADVIL,MOTRIN) 800 MG tablet Take 1 tablet (800 mg total) by mouth every 6 (six) hours as needed for Pain. 20 tablet 1    lisinopriL-hydrochlorothiazide (PRINZIDE,ZESTORETIC) 20-25 mg Tab Take 1 tablet by mouth once daily. 90 tablet 3    nystatin (MYCOSTATIN) powder Apply topically once daily. 60 g 3    tiZANidine (ZANAFLEX) 4 MG tablet Take 1 tablet (4 mg total) by mouth every 6 (six) hours as needed (jaw pain). 40 tablet 0    [DISCONTINUED] HYDROcodone-acetaminophen (NORCO) 7.5-325 mg per tablet Take 1 tablet by mouth every 6 (six) hours as needed for Pain. 18 tablet  0    [DISCONTINUED] latanoprost 0.005 % ophthalmic solution INT 1 GTT INTO OU HS  0     Current Facility-Administered Medications on File Prior to Visit   Medication Dose Route Frequency Provider Last Rate Last Admin    [DISCONTINUED] sodium chloride 0.9% flush 10 mL  10 mL Intravenous PRN Lyudmila Barrera MD           Review of Systems  Review of Systems   Constitutional: Negative.    HENT:  Positive for ear pain.    Eyes: Negative.    Respiratory: Negative.     Cardiovascular: Negative.    Gastrointestinal: Negative.    Genitourinary: Negative.    Musculoskeletal:  Positive for neck pain.   Skin: Negative.    Neurological:  Positive for headaches.   Psychiatric/Behavioral: Negative.          Social History   reports that she quit smoking about 5 years ago. Her smoking use included cigarettes. She has never used smokeless tobacco. She reports that she does not drink alcohol and does not use drugs.     Family History  Family History   Problem Relation Age of Onset    Hypertension Mother         Physical Exam   There were no vitals filed for this visit. There is no height or weight on file to calculate BMI.            GENERAL: no acute distress.  HEAD: normocephalic.   EYES: No scleral icterus  EARS: external ear without lesion, normal pinna shape and position.  External auditory canal with normal cerumen, tympanic membrane fully visible, no perforation , no retraction. No middle ear effusion. Ossicles intact.   NOSE: external nose without significant bony abnormality  ORAL CAVITY/OROPHARYNX: tongue mobile.   NECK: trachea midline. Parotid mass      LYMPH NODES:No cervical lymphadenopathy.  RESPIRATORY: no stridor, no stertor. Voice normal. Respirations nonlabored.  NEURO: alert, responds to questions appropriately.    PSYCH:mood appropriate      Imaging:  The patient does have any new imaging of the head and neck since last visit. Ct neck images and report personally reviewed.         Labs:  CBC  Recent Labs    Lab 05/15/23  0754 08/30/23  0828 09/26/23  1205   WBC 7.91 7.22 10.63   Hemoglobin 14.9 15.1 14.5   Hematocrit 47.3 47.3 43.6   MCV 93 93 90   Platelets 214 191 212     BMP  Recent Labs   Lab 05/15/23  0754 08/30/23  0828 09/26/23  1205   Glucose 105 98 90   Sodium 140 141 141   Potassium 4.5 4.1 4.1   Chloride 102 105 101   CO2 31 H 26 27   BUN 14 17 14   Creatinine 0.9 0.9 0.8   Calcium 9.8 9.4 9.8     COAGS        Assessment  1. Mass of right parotid gland    2. Oropharyngeal lesion       Plan:  Discussed plan of care with patient in detail and all questions answered. Patient reported understanding of plan of care. I gave the patient the opportunity to ask questions and patient confirmed all questions answered to satisfaction.     Area on neck from initial photo sent in concerning for tumor but I think was crusting. Today looks more like infection trying to break through skin. Need fna of deeper more solid portion and should still not needle through the open area in case of malignancy. Discussed with aptient and son that if cancer would need larger operation to resect area of skin with normal margin. Will need to resect some skin so that there is not spillage of contents. High liklihood of nerve weakness given proximity to skull base and recent inflammation infection, continue abx until surgery  May need to push back surgery date if dont have fna results   Son is EMT , here today from [place] with her    Reached out to anesthesia NP about recent ekg and if additional workup needed  Will follow up in [facility] Friday about that and if any results are back. Confirmed that they can message through my chart  F/u postop    I spent a total of 40 minutes on the day of the visit.  This includes face to face time and non-face to face time preparing to see the patient (eg, review of tests), obtaining and/or reviewing separately obtained history, documenting clinical information in the electronic or other health record,  independently interpreting results and communicating results to the patient/family/caregiver, or care coordinator.   Please be aware that this note has been generated with the assistance of MModal voice-to-text.  Please excuse any spelling or grammatical errors.

## 2023-09-26 NOTE — DISCHARGE INSTRUCTIONS
Before 7 AM, enter through the Emergency Entrance..   After 7 AM enter through the Main Entrance.      Your procedure  is scheduled for _10/3/2023________.    Call [phone] between 2pm and 5pm on ___10/02/2023____to find out your arrival time for the day of surgery.    You may use the main entrance to the hospital on the [facility] side, or the entrance that is next to the [facility].    You may have one visitor.  No children allowed.     You will be going to the Same Day Surgery Unit on the 2nd floor of the hospital.    Important instructions:  Do not eat anything after midnight.  You may have plain water, non carbonated.  You may also have Gatorade or Powerade after midnight.    Stop all fluids 2 hours before your surgery.    It is okay to brush your teeth.  Do not have gum, candy or mints.    SEE MEDICATION SHEET.   TAKE MEDICATIONS AS DIRECTED WITH SIPS OF WATER.      Do not take any diabetic medication on the morning of surgery unless instructed to do so by your doctor or pre op nurse.    STOP taking Aspirin, Ibuprofen,  Advil, Motrin, Mobic(meloxicam), Aleve (naproxen), Fish oil, and Vitamin E for at least 7 days before your surgery.     You may take Tylenol if needed which is not a blood thinner.    Please shower the night before and the morning of your surgery.      Follow any Prep Instructions given by your surgeon.    Use Chlorhexidine soap as instructed by your pre op nurse.   Please place clean linens on your bed the night before surgery. Please wear fresh clean clothing after each shower.    No shaving of procedural area at least 4-5 days before surgery due to increased risk of skin irritation and/or possible infection.    Female patients may be asked for a urine specimen on the morning of the surgery.  Please check with your nurse before using the restroom.    Contact lenses and removable denture work may not be worn during your procedure.    You may wear deodorant only. If you are  having breast surgery, do not wear deodorant on the operative side.    Do not wear powder, body lotion, perfume/cologne or make-up.    Do not wear any jewelry or have any metal on your body.    You will be asked to remove any dentures or partials for the procedure.    If you are going home on the same day of surgery, you must arrange for a family member or a friend to drive you home.  Public transportation is prohibited.  You will not be able to drive home if you were given anesthesia or sedation.    Patients who want to have their Post-op prescriptions filled from our in-house [facility], bring a Credit/Debit Card or cash with you. A co-pay may be required.  The pharmacy closes at 5:30 pm.    Wear loose fitting clothes allowing for bandages.    Please leave money and valuables home.      You may bring your cell phone.    Call the doctor if fever or illness should occur before your surgery.    Call [phone] to contact us here if needed.                            CLOTHES ON DAY OF SURGERY    SHOULDER surgery:  you must have a very oversized shirt.  Very, Very large.  You will probably have a large sling on with your arm strapped to your chest.  You will not be able to put the arm of the operated shoulder into a sleeve.  You can put the arm of the un-operated shoulder into the sleeve, but the shirt will need to be draped over the operated shoulder.       ARM or HAND surgery:  make sure that your sleeves are large and loose enough to pass over large dressings or cast.      BREAST or UNDERARM surgery:  wear a loose, button down shirt so that you can dress without raising your arms over your head.    ABDOMINAL surgery:  wear loose, comfortable clothing.  Nothing tight around the abdomen.  NO JEANS    PENIS or SCROTAL surgery:  loose comfortable clothing.  Large sweat pants, pajama pants or a robe.  ABSOLUTELY NO JEANS      LEG or FOOT surgery:  wear large loose pants that are able to pass over any large dressings  or casts.  You could also wear loose shorts or a skirt.

## 2023-09-26 NOTE — PRE-PROCEDURE INSTRUCTIONS
Patient's BP elevated in pre-op.  Her BP did come down to 187/89.  Patient reports she is in pain and she has white coat syndrome. She states she does not check at home but recently got a BP cuff. Instructed patient to check BP at home in the morning and evening and keep a log to see trends. Patient verbalizes understanding.  Last BP in PCP office was 119/77

## 2023-09-27 ENCOUNTER — HOSPITAL ENCOUNTER (OUTPATIENT)
Dept: INTERVENTIONAL RADIOLOGY/VASCULAR | Facility: HOSPITAL | Age: 68
Discharge: HOME | End: 2023-09-27
Attending: OTOLARYNGOLOGY
Payer: MEDICARE

## 2023-09-27 VITALS
DIASTOLIC BLOOD PRESSURE: 58 MMHG | RESPIRATION RATE: 16 BRPM | HEART RATE: 82 BPM | SYSTOLIC BLOOD PRESSURE: 117 MMHG | OXYGEN SATURATION: 93 %

## 2023-09-27 DIAGNOSIS — K11.8 NODULE OF PAROTID GLAND: Primary | ICD-10-CM

## 2023-09-27 DIAGNOSIS — K11.8 MASS OF RIGHT PAROTID GLAND: ICD-10-CM

## 2023-09-27 PROCEDURE — 10005 FNA BX W/US GDN 1ST LES: CPT | Mod: ,,, | Performed by: RADIOLOGY

## 2023-09-27 PROCEDURE — 99203 OFFICE O/P NEW LOW 30 MIN: CPT | Mod: ,,, | Performed by: RADIOLOGY

## 2023-09-27 PROCEDURE — 88173 CYTOPATH EVAL FNA REPORT: CPT | Performed by: PATHOLOGY

## 2023-09-27 PROCEDURE — 25000003 PHARM REV CODE 250: Performed by: RADIOLOGY

## 2023-09-27 PROCEDURE — 27200940 IR US FINE NEEDLE ASPIRATION BIOPSY, FIRST LESION

## 2023-09-27 PROCEDURE — A4215 STERILE NEEDLE: HCPCS

## 2023-09-27 PROCEDURE — 88172 CYTP DX EVAL FNA 1ST EA SITE: CPT | Performed by: PATHOLOGY

## 2023-09-27 PROCEDURE — 10005 IR US FINE NEEDLE ASPIRATION BIOPSY, FIRST LESION: ICD-10-PCS | Mod: ,,, | Performed by: RADIOLOGY

## 2023-09-27 PROCEDURE — 88172 CYTP DX EVAL FNA 1ST EA SITE: CPT | Mod: 26,,, | Performed by: PATHOLOGY

## 2023-09-27 PROCEDURE — 10005 FNA BX W/US GDN 1ST LES: CPT

## 2023-09-27 PROCEDURE — 88173 CYTOPATH EVAL FNA REPORT: CPT | Mod: 26,,, | Performed by: PATHOLOGY

## 2023-09-27 PROCEDURE — 88173 PR  INTERPRETATION OF FNA SMEAR: ICD-10-PCS | Mod: 26,,, | Performed by: PATHOLOGY

## 2023-09-27 PROCEDURE — 88172 PR  EVALUATION OF FNA SMEAR TO DETERMINE ADEQUACY, FIRST EVAL: ICD-10-PCS | Mod: 26,,, | Performed by: PATHOLOGY

## 2023-09-27 PROCEDURE — 99203 PR OFFICE/OUTPT VISIT, NEW, LEVL III, 30-44 MIN: ICD-10-PCS | Mod: ,,, | Performed by: RADIOLOGY

## 2023-09-27 RX ORDER — LIDOCAINE HYDROCHLORIDE 10 MG/ML
INJECTION INFILTRATION; PERINEURAL
Status: COMPLETED | OUTPATIENT
Start: 2023-09-27 | End: 2023-09-27

## 2023-09-27 RX ADMIN — LIDOCAINE HYDROCHLORIDE 5 ML: 10 INJECTION, SOLUTION INFILTRATION; PERINEURAL at 01:09

## 2023-09-27 NOTE — DISCHARGE SUMMARY
Radiology Discharge Summary      Hospital Course: No complications    Admit Date: 9/27/2023  Discharge Date: 09/27/2023     Instructions Given to Patient: Yes    Diet: Resume prior diet    Activity: activity as tolerated    Description of Condition on Discharge: Stable    Vital Signs (Most Recent): Pulse: 82 (09/27/23 1407)  Resp: 16 (09/27/23 1407)  BP: (!) 117/58 (09/27/23 1407)  SpO2: (!) 93 % (09/27/23 1407)    Discharge Disposition: Home    Discharge Diagnosis:  68 y.o. female with pertinent PMHx of HTN, tobacco dependence, COPD and recent development of progressive swelling and pain of the right side of the face with imaging revealing two suspicious Rt parotid lesions including a 2.1 x 2.5 x 3.3-cm nodule in the deep lobe of the right parotid gland concerning for malignancy requiring image-guided tissue-sampling for diagnosis and treatment planning.    Patient is now s/p successful US-guided percutaneous 25-gauge FNA of the suspicious Rt parotid gland nodule with local anesthetic only. Patient tolerated the procedure well. No immediate post-procedural complications noted.     No post-op activity, diet or medication restrictions.    Thank you for considering IR for the care of your patient.     Juan Manuel Duran MD  Interventional Radiology

## 2023-09-27 NOTE — Clinical Note
Right: Neck.   Scrubbed with Chlorhexidine/Alcohol.    Hair: N/A.  Skin prep dry before draping.  Prepped by: Juan Manuel Duran MD 9/27/2023 1:40 PM.

## 2023-09-27 NOTE — BRIEF OP NOTE
Radiology Post-Procedure Note    Pre Op Diagnosis: 1. Suspicious Rt parotid nodules  Post Op Diagnosis: Same    Procedure: 1. US-guided percutaneous 25-gauge FNA of the suspicious Rt parotid gland nodule    Procedure performed by: Juan Manuel Duran MD    Written Informed Consent Obtained: Yes  Specimen Removed: YES, 25-G FNA x 3 passes  Estimated Blood Loss: none    Findings:   Successful US-guided percutaneous 25-gauge FNA of the suspicious Rt parotid gland nodule with local anesthetic only. Patient tolerated the procedure well. No immediate post-procedural complications noted.     No post-op activity, diet or medication restrictions.    Thank you for considering IR for the care of your patient.     Juan Manuel Duran MD  Interventional Radiology

## 2023-09-27 NOTE — H&P
Radiology History & Physical      SUBJECTIVE:     Chief Complaint: Suspicious Rt parotid nodules    History of Present Illness:  Chanel Esparza is a 68 y.o. female with pertinent PMHx of HTN, tobacco dependence, COPD and recent development of progressive swelling and pain of the right side of the face with imaging revealing two suspicious Rt parotid lesions including a 2.1 x 2.5 x 3.3-cm nodule in the deep lobe of the right parotid gland concerning for malignancy requiring image-guided tissue-sampling for diagnosis and treatment planning.    A new outpatient IR consult received for US-guided percutaneous 25-gauge FNA of the suspicious Rt parotid gland lesion.    Past Medical History:   Diagnosis Date    Asthma     Emphysema of lung     Hypertension      Past Surgical History:   Procedure Laterality Date     SECTION      CHOLECYSTECTOMY      HYSTERECTOMY      CAPRICE     Home Meds:   Prior to Admission medications    Medication Sig Start Date End Date Taking? Authorizing Provider   albuterol (PROVENTIL/VENTOLIN HFA) 90 mcg/actuation inhaler INHALE 2 PUFF INTO LUNGS EVERY 6 HOURS AS NEEDED FOR WHEEZING 23   Warner Richey MD   albuterol-ipratropium (DUO-NEB) 2.5 mg-0.5 mg/3 mL nebulizer solution Take 3 mLs by nebulization every 6 (six) hours as needed for Wheezing or Shortness of Breath. Rescue 7/3/23 7/2/24  Warner Richey MD   aspirin (ECOTRIN) 81 MG EC tablet Take 81 mg by mouth once daily.    Provider, Historical   CETIRIZINE HCL (ZYRTEC ORAL) Take by mouth.    Provider, Historical   clindamycin (CLEOCIN) 300 MG capsule Take 1 capsule (300 mg total) by mouth 3 (three) times daily. for 7 days 9/26/23 10/3/23  Lyudmila Barrera MD   clotrimazole (LOTRIMIN) 1 % cream Apply topically 2 (two) times daily. For 14 days 23   Warner Richey MD   fluticasone-umeclidin-vilanter (TRELEGY ELLIPTA) 100-62.5-25 mcg DsDv Inhale 1 puff into the lungs once daily. 23   Warner Richey MD   ibuprofen  "(ADVIL,MOTRIN) 800 MG tablet Take 1 tablet (800 mg total) by mouth every 6 (six) hours as needed for Pain. 9/14/23   Lyudmila Barrera MD   lisinopriL-hydrochlorothiazide (PRINZIDE,ZESTORETIC) 20-25 mg Tab Take 1 tablet by mouth once daily. 9/13/22   Warner Richey MD   nystatin (MYCOSTATIN) powder Apply topically once daily. 5/31/23   Warner Richey MD   tiZANidine (ZANAFLEX) 4 MG tablet Take 1 tablet (4 mg total) by mouth every 6 (six) hours as needed (jaw pain). 9/11/23   Warner Richey MD     Anticoagulants/Antiplatelets: no anticoagulation    Allergies: Review of patient's allergies indicates:  No Known Allergies    Sedation History:  no adverse reactions    Review of Systems:   Hematological: no known coagulopathies  Respiratory: no cough, shortness of breath, or wheezing  Cardiovascular: no chest pain or dyspnea on exertion  Gastrointestinal: no abdominal pain, change in bowel habits, or black or bloody stools  Genito-Urinary: no dysuria, trouble voiding, or hematuria  Musculoskeletal: negative  Neurological: no TIA or stroke symptoms       OBJECTIVE:     Vital Signs (Most Recent)       Physical Exam:  General: no acute distress  Mental Status: alert and oriented to person, place and time  HEENT: normocephalic, atraumatic. Purulent facial drainage via wound overlying Rt parotid region.  Chest: unlabored breathing  Heart: regular heart rate  Abdomen: nondistended  Extremity: moves all extremities    Laboratory  No results found for: "INR", "PT", "PTT"    Lab Results   Component Value Date    WBC 10.63 09/26/2023    HGB 14.5 09/26/2023    HCT 43.6 09/26/2023    MCV 90 09/26/2023     09/26/2023      Lab Results   Component Value Date    GLU 90 09/26/2023     09/26/2023    K 4.1 09/26/2023     09/26/2023    CO2 27 09/26/2023    BUN 14 09/26/2023    CREATININE 0.8 09/26/2023    CALCIUM 9.8 09/26/2023    ALT 34 09/26/2023    AST 22 09/26/2023    ALBUMIN 3.3 (L) 09/26/2023    BILITOT " 0.8 09/26/2023     ASSESSMENT/PLAN:     68 y.o. female with pertinent PMHx of HTN, tobacco dependence, COPD and recent development of progressive swelling and pain of the right side of the face with imaging revealing two suspicious Rt parotid lesions including a 2.1 x 2.5 x 3.3-cm nodule in the deep lobe of the right parotid gland concerning for malignancy requiring image-guided tissue-sampling for diagnosis and treatment planning.    Suspicious Rt parotid nodules - Will attempt US-guided percutaneous 25-gauge FNA of the suspicious Rt parotid gland lesion with local anesthetic only.    Risks (including, but not limited to, pain, bleeding, infection, damage to nearby structures, failure to obtain sufficient material for a diagnosis, the need for additional procedures, and death), benefits, and alternatives were discussed with the patient. All questions were answered to the best of my abilities. The patient wishes to proceed with the procedure. Written informed consent was obtained.    Thank you for considering IR for the care of your patient.     Juan Manuel Duran MD  Interventional Radiology

## 2023-09-29 ENCOUNTER — PATIENT MESSAGE (OUTPATIENT)
Dept: SURGERY | Facility: HOSPITAL | Age: 68
End: 2023-09-29
Payer: MEDICARE

## 2023-10-02 ENCOUNTER — TELEPHONE (OUTPATIENT)
Dept: SURGERY | Facility: HOSPITAL | Age: 68
End: 2023-10-02
Payer: MEDICARE

## 2023-10-02 ENCOUNTER — TELEPHONE (OUTPATIENT)
Dept: OTOLARYNGOLOGY | Facility: HOSPITAL | Age: 68
End: 2023-10-02
Payer: MEDICARE

## 2023-10-02 LAB
ADEQUACY: ABNORMAL
FINAL PATHOLOGIC DIAGNOSIS: ABNORMAL
Lab: ABNORMAL

## 2023-10-02 NOTE — TELEPHONE ENCOUNTER
Called and spoke to son and patient . Spoke with pathologist dr daniels as well prior to calling patient. Overall favor warthin tumor    Discussed again with patient and son that will remove small area of skin and take a frozen section margin to confirm but I also suspect warthin tumor. If frozen positive would need to take more skin and potential may not be able to close the entire incision line and may have to do delayed closure in future . Unable to rule out malignancy with cyto only. They understand and understand plan of care and no questions.

## 2023-10-03 ENCOUNTER — HOSPITAL ENCOUNTER (OUTPATIENT)
Facility: HOSPITAL | Age: 68
Discharge: HOME | End: 2023-10-04
Attending: OTOLARYNGOLOGY | Admitting: OTOLARYNGOLOGY
Payer: MEDICARE

## 2023-10-03 ENCOUNTER — ANESTHESIA (OUTPATIENT)
Dept: SURGERY | Facility: HOSPITAL | Age: 68
End: 2023-10-03
Payer: MEDICARE

## 2023-10-03 DIAGNOSIS — K11.8 MASS OF RIGHT PAROTID GLAND: ICD-10-CM

## 2023-10-03 PROCEDURE — D9220A PRA ANESTHESIA: Mod: ANES,,, | Performed by: ANESTHESIOLOGY

## 2023-10-03 PROCEDURE — 63600175 PHARM REV CODE 636 W HCPCS: Performed by: ANESTHESIOLOGY

## 2023-10-03 PROCEDURE — C1729 CATH, DRAINAGE: HCPCS | Performed by: OTOLARYNGOLOGY

## 2023-10-03 PROCEDURE — 88305 TISSUE EXAM BY PATHOLOGIST: ICD-10-PCS | Mod: 26,,, | Performed by: PATHOLOGY

## 2023-10-03 PROCEDURE — 42410 PR EXC PAROTID TUMOR/GLAND, LAT LOBE, W/O NERVE DISS: ICD-10-PCS | Mod: RT,,, | Performed by: OTOLARYNGOLOGY

## 2023-10-03 PROCEDURE — 27201423 OPTIME MED/SURG SUP & DEVICES STERILE SUPPLY: Performed by: OTOLARYNGOLOGY

## 2023-10-03 PROCEDURE — 88331 PATH CONSLTJ SURG 1 BLK 1SPC: CPT | Mod: 26,,, | Performed by: PATHOLOGY

## 2023-10-03 PROCEDURE — 31526 DX LARYNGOSCOPY W/OPER SCOPE: CPT | Mod: 51,,, | Performed by: OTOLARYNGOLOGY

## 2023-10-03 PROCEDURE — 88331 PR  PATH CONSULT IN SURG,W FRZ SEC: ICD-10-PCS | Mod: 26,,, | Performed by: PATHOLOGY

## 2023-10-03 PROCEDURE — D9220A PRA ANESTHESIA: ICD-10-PCS | Mod: ANES,,, | Performed by: ANESTHESIOLOGY

## 2023-10-03 PROCEDURE — 88307 PR  SURG PATH,LEVEL V: ICD-10-PCS | Mod: 26,,, | Performed by: PATHOLOGY

## 2023-10-03 PROCEDURE — 25000003 PHARM REV CODE 250: Performed by: OTOLARYNGOLOGY

## 2023-10-03 PROCEDURE — 25000003 PHARM REV CODE 250: Performed by: STUDENT IN AN ORGANIZED HEALTH CARE EDUCATION/TRAINING PROGRAM

## 2023-10-03 PROCEDURE — 42410 EXCISE PAROTID GLAND/LESION: CPT | Mod: RT,,, | Performed by: OTOLARYNGOLOGY

## 2023-10-03 PROCEDURE — 88305 TISSUE EXAM BY PATHOLOGIST: CPT | Mod: 26,,, | Performed by: PATHOLOGY

## 2023-10-03 PROCEDURE — 88307 TISSUE EXAM BY PATHOLOGIST: CPT | Performed by: PATHOLOGY

## 2023-10-03 PROCEDURE — 36000709 HC OR TIME LEV III EA ADD 15 MIN: Performed by: OTOLARYNGOLOGY

## 2023-10-03 PROCEDURE — 36000708 HC OR TIME LEV III 1ST 15 MIN: Performed by: OTOLARYNGOLOGY

## 2023-10-03 PROCEDURE — 25000003 PHARM REV CODE 250: Performed by: ANESTHESIOLOGY

## 2023-10-03 PROCEDURE — 88307 TISSUE EXAM BY PATHOLOGIST: CPT | Mod: 26,,, | Performed by: PATHOLOGY

## 2023-10-03 PROCEDURE — 37000008 HC ANESTHESIA 1ST 15 MINUTES: Performed by: OTOLARYNGOLOGY

## 2023-10-03 PROCEDURE — 88331 PATH CONSLTJ SURG 1 BLK 1SPC: CPT | Performed by: PATHOLOGY

## 2023-10-03 PROCEDURE — D9220A PRA ANESTHESIA: Mod: CRNA,,, | Performed by: STUDENT IN AN ORGANIZED HEALTH CARE EDUCATION/TRAINING PROGRAM

## 2023-10-03 PROCEDURE — 63600175 PHARM REV CODE 636 W HCPCS: Performed by: OTOLARYNGOLOGY

## 2023-10-03 PROCEDURE — 37000009 HC ANESTHESIA EA ADD 15 MINS: Performed by: OTOLARYNGOLOGY

## 2023-10-03 PROCEDURE — 71000033 HC RECOVERY, INTIAL HOUR: Performed by: OTOLARYNGOLOGY

## 2023-10-03 PROCEDURE — 31526 PR LARYNGOSCOPY,DIRECT,DX,OP MICROSCOP: ICD-10-PCS | Mod: 51,,, | Performed by: OTOLARYNGOLOGY

## 2023-10-03 PROCEDURE — 63600175 PHARM REV CODE 636 W HCPCS: Performed by: STUDENT IN AN ORGANIZED HEALTH CARE EDUCATION/TRAINING PROGRAM

## 2023-10-03 PROCEDURE — 88305 TISSUE EXAM BY PATHOLOGIST: CPT | Performed by: PATHOLOGY

## 2023-10-03 PROCEDURE — D9220A PRA ANESTHESIA: ICD-10-PCS | Mod: CRNA,,, | Performed by: STUDENT IN AN ORGANIZED HEALTH CARE EDUCATION/TRAINING PROGRAM

## 2023-10-03 PROCEDURE — 71000039 HC RECOVERY, EACH ADD'L HOUR: Performed by: OTOLARYNGOLOGY

## 2023-10-03 RX ORDER — PROPOFOL 10 MG/ML
VIAL (ML) INTRAVENOUS
Status: DISCONTINUED | OUTPATIENT
Start: 2023-10-03 | End: 2023-10-03

## 2023-10-03 RX ORDER — HYDROMORPHONE HYDROCHLORIDE 2 MG/ML
0.2 INJECTION, SOLUTION INTRAMUSCULAR; INTRAVENOUS; SUBCUTANEOUS EVERY 5 MIN PRN
Status: DISCONTINUED | OUTPATIENT
Start: 2023-10-03 | End: 2023-10-03 | Stop reason: HOSPADM

## 2023-10-03 RX ORDER — LIDOCAINE HYDROCHLORIDE 10 MG/ML
1 INJECTION, SOLUTION EPIDURAL; INFILTRATION; INTRACAUDAL; PERINEURAL ONCE
Status: DISCONTINUED | OUTPATIENT
Start: 2023-10-03 | End: 2023-10-03

## 2023-10-03 RX ORDER — SODIUM CHLORIDE, SODIUM LACTATE, POTASSIUM CHLORIDE, CALCIUM CHLORIDE 600; 310; 30; 20 MG/100ML; MG/100ML; MG/100ML; MG/100ML
INJECTION, SOLUTION INTRAVENOUS CONTINUOUS
Status: DISCONTINUED | OUTPATIENT
Start: 2023-10-03 | End: 2023-10-03

## 2023-10-03 RX ORDER — ACETAMINOPHEN 500 MG
1000 TABLET ORAL
Status: COMPLETED | OUTPATIENT
Start: 2023-10-03 | End: 2023-10-03

## 2023-10-03 RX ORDER — DEXAMETHASONE SODIUM PHOSPHATE 4 MG/ML
INJECTION, SOLUTION INTRA-ARTICULAR; INTRALESIONAL; INTRAMUSCULAR; INTRAVENOUS; SOFT TISSUE
Status: DISCONTINUED | OUTPATIENT
Start: 2023-10-03 | End: 2023-10-03

## 2023-10-03 RX ORDER — HYDRALAZINE HYDROCHLORIDE 20 MG/ML
10 INJECTION INTRAMUSCULAR; INTRAVENOUS EVERY 6 HOURS PRN
Status: DISCONTINUED | OUTPATIENT
Start: 2023-10-03 | End: 2023-10-04 | Stop reason: HOSPADM

## 2023-10-03 RX ORDER — CLINDAMYCIN HYDROCHLORIDE 150 MG/1
300 CAPSULE ORAL 3 TIMES DAILY
Status: DISCONTINUED | OUTPATIENT
Start: 2023-10-03 | End: 2023-10-04 | Stop reason: HOSPADM

## 2023-10-03 RX ORDER — BACITRACIN 500 [USP'U]/G
OINTMENT TOPICAL 3 TIMES DAILY
Status: DISCONTINUED | OUTPATIENT
Start: 2023-10-03 | End: 2023-10-04 | Stop reason: HOSPADM

## 2023-10-03 RX ORDER — IPRATROPIUM BROMIDE 0.5 MG/2.5ML
0.5 SOLUTION RESPIRATORY (INHALATION)
Status: DISCONTINUED | OUTPATIENT
Start: 2023-10-04 | End: 2023-10-04 | Stop reason: HOSPADM

## 2023-10-03 RX ORDER — CEFAZOLIN SODIUM 2 G/50ML
2 SOLUTION INTRAVENOUS
Status: COMPLETED | OUTPATIENT
Start: 2023-10-03 | End: 2023-10-03

## 2023-10-03 RX ORDER — IBUPROFEN 400 MG/1
800 TABLET ORAL EVERY 6 HOURS PRN
Status: DISCONTINUED | OUTPATIENT
Start: 2023-10-03 | End: 2023-10-04 | Stop reason: HOSPADM

## 2023-10-03 RX ORDER — MUPIROCIN 20 MG/G
OINTMENT TOPICAL
Status: DISCONTINUED | OUTPATIENT
Start: 2023-10-03 | End: 2023-10-03 | Stop reason: HOSPADM

## 2023-10-03 RX ORDER — FENTANYL CITRATE 50 UG/ML
INJECTION, SOLUTION INTRAMUSCULAR; INTRAVENOUS
Status: DISCONTINUED | OUTPATIENT
Start: 2023-10-03 | End: 2023-10-03

## 2023-10-03 RX ORDER — OXYMETAZOLINE HCL 0.05 %
SPRAY, NON-AEROSOL (ML) NASAL
Status: DISCONTINUED | OUTPATIENT
Start: 2023-10-03 | End: 2023-10-03 | Stop reason: HOSPADM

## 2023-10-03 RX ORDER — ONDANSETRON 2 MG/ML
4 INJECTION INTRAMUSCULAR; INTRAVENOUS EVERY 12 HOURS PRN
Status: DISCONTINUED | OUTPATIENT
Start: 2023-10-03 | End: 2023-10-04 | Stop reason: HOSPADM

## 2023-10-03 RX ORDER — ONDANSETRON 2 MG/ML
INJECTION INTRAMUSCULAR; INTRAVENOUS
Status: DISCONTINUED | OUTPATIENT
Start: 2023-10-03 | End: 2023-10-03

## 2023-10-03 RX ORDER — ROCURONIUM BROMIDE 10 MG/ML
INJECTION, SOLUTION INTRAVENOUS
Status: DISCONTINUED | OUTPATIENT
Start: 2023-10-03 | End: 2023-10-03

## 2023-10-03 RX ORDER — DIPHENHYDRAMINE HCL 25 MG
25 CAPSULE ORAL EVERY 6 HOURS PRN
Status: DISCONTINUED | OUTPATIENT
Start: 2023-10-03 | End: 2023-10-04 | Stop reason: HOSPADM

## 2023-10-03 RX ORDER — ACETAMINOPHEN 325 MG/1
650 TABLET ORAL EVERY 6 HOURS PRN
Status: DISCONTINUED | OUTPATIENT
Start: 2023-10-03 | End: 2023-10-04 | Stop reason: HOSPADM

## 2023-10-03 RX ORDER — LIDOCAINE HYDROCHLORIDE 20 MG/ML
INJECTION INTRAVENOUS
Status: DISCONTINUED | OUTPATIENT
Start: 2023-10-03 | End: 2023-10-03

## 2023-10-03 RX ORDER — EPHEDRINE SULFATE 50 MG/ML
INJECTION, SOLUTION INTRAVENOUS
Status: DISCONTINUED | OUTPATIENT
Start: 2023-10-03 | End: 2023-10-03

## 2023-10-03 RX ORDER — HYDROCHLOROTHIAZIDE 25 MG/1
25 TABLET ORAL DAILY
Status: DISCONTINUED | OUTPATIENT
Start: 2023-10-04 | End: 2023-10-04 | Stop reason: HOSPADM

## 2023-10-03 RX ORDER — PHENYLEPHRINE HYDROCHLORIDE 10 MG/ML
INJECTION INTRAVENOUS
Status: DISCONTINUED | OUTPATIENT
Start: 2023-10-03 | End: 2023-10-03

## 2023-10-03 RX ORDER — VASOPRESSIN 20 [USP'U]/ML
INJECTION, SOLUTION INTRAMUSCULAR; SUBCUTANEOUS
Status: DISCONTINUED | OUTPATIENT
Start: 2023-10-03 | End: 2023-10-03

## 2023-10-03 RX ORDER — HYDROCODONE BITARTRATE AND ACETAMINOPHEN 5; 325 MG/1; MG/1
1 TABLET ORAL EVERY 4 HOURS PRN
Status: DISCONTINUED | OUTPATIENT
Start: 2023-10-03 | End: 2023-10-04 | Stop reason: HOSPADM

## 2023-10-03 RX ORDER — SUCCINYLCHOLINE CHLORIDE 20 MG/ML
INJECTION INTRAMUSCULAR; INTRAVENOUS
Status: DISCONTINUED | OUTPATIENT
Start: 2023-10-03 | End: 2023-10-03

## 2023-10-03 RX ORDER — REMIFENTANIL HYDROCHLORIDE 1 MG/ML
INJECTION, POWDER, LYOPHILIZED, FOR SOLUTION INTRAVENOUS CONTINUOUS PRN
Status: DISCONTINUED | OUTPATIENT
Start: 2023-10-03 | End: 2023-10-03

## 2023-10-03 RX ORDER — IPRATROPIUM BROMIDE AND ALBUTEROL SULFATE 2.5; .5 MG/3ML; MG/3ML
3 SOLUTION RESPIRATORY (INHALATION) EVERY 6 HOURS PRN
Status: DISCONTINUED | OUTPATIENT
Start: 2023-10-03 | End: 2023-10-04 | Stop reason: HOSPADM

## 2023-10-03 RX ORDER — DEXTROSE MONOHYDRATE, SODIUM CHLORIDE, AND POTASSIUM CHLORIDE 50; 1.49; 9 G/1000ML; G/1000ML; G/1000ML
INJECTION, SOLUTION INTRAVENOUS CONTINUOUS
Status: DISCONTINUED | OUTPATIENT
Start: 2023-10-03 | End: 2023-10-04 | Stop reason: HOSPADM

## 2023-10-03 RX ORDER — TIZANIDINE 4 MG/1
4 TABLET ORAL EVERY 6 HOURS PRN
Status: DISCONTINUED | OUTPATIENT
Start: 2023-10-03 | End: 2023-10-04 | Stop reason: HOSPADM

## 2023-10-03 RX ORDER — BUDESONIDE 0.5 MG/2ML
0.5 INHALANT ORAL 2 TIMES DAILY
Status: DISCONTINUED | OUTPATIENT
Start: 2023-10-04 | End: 2023-10-04 | Stop reason: HOSPADM

## 2023-10-03 RX ORDER — LISINOPRIL 20 MG/1
20 TABLET ORAL DAILY
Status: DISCONTINUED | OUTPATIENT
Start: 2023-10-04 | End: 2023-10-04 | Stop reason: HOSPADM

## 2023-10-03 RX ORDER — ARFORMOTEROL TARTRATE 15 UG/2ML
15 SOLUTION RESPIRATORY (INHALATION) 2 TIMES DAILY
Status: DISCONTINUED | OUTPATIENT
Start: 2023-10-04 | End: 2023-10-04 | Stop reason: HOSPADM

## 2023-10-03 RX ORDER — SODIUM CHLORIDE 0.9 % (FLUSH) 0.9 %
10 SYRINGE (ML) INJECTION
Status: DISCONTINUED | OUTPATIENT
Start: 2023-10-03 | End: 2023-10-03 | Stop reason: HOSPADM

## 2023-10-03 RX ADMIN — ROCURONIUM BROMIDE 20 MG: 10 INJECTION, SOLUTION INTRAVENOUS at 08:10

## 2023-10-03 RX ADMIN — SODIUM CHLORIDE, POTASSIUM CHLORIDE, SODIUM LACTATE AND CALCIUM CHLORIDE: 600; 310; 30; 20 INJECTION, SOLUTION INTRAVENOUS at 12:10

## 2023-10-03 RX ADMIN — PHENYLEPHRINE HYDROCHLORIDE 300 MCG: 10 INJECTION INTRAVENOUS at 08:10

## 2023-10-03 RX ADMIN — VASOPRESSIN 1 UNITS: 20 INJECTION INTRAVENOUS at 08:10

## 2023-10-03 RX ADMIN — PHENYLEPHRINE HYDROCHLORIDE 60 MCG/MIN: 10 INJECTION INTRAVENOUS at 09:10

## 2023-10-03 RX ADMIN — EPHEDRINE SULFATE 15 MG: 50 INJECTION INTRAVENOUS at 09:10

## 2023-10-03 RX ADMIN — BACITRACIN: 500 OINTMENT TOPICAL at 09:10

## 2023-10-03 RX ADMIN — SUGAMMADEX 200 MG: 100 INJECTION, SOLUTION INTRAVENOUS at 08:10

## 2023-10-03 RX ADMIN — CLINDAMYCIN HYDROCHLORIDE 300 MG: 150 CAPSULE ORAL at 03:10

## 2023-10-03 RX ADMIN — HYDROCODONE BITARTRATE AND ACETAMINOPHEN 1 TABLET: 5; 325 TABLET ORAL at 11:10

## 2023-10-03 RX ADMIN — VASOPRESSIN 1 UNITS: 20 INJECTION INTRAVENOUS at 10:10

## 2023-10-03 RX ADMIN — LIDOCAINE HYDROCHLORIDE 80 MG: 20 INJECTION, SOLUTION INTRAVENOUS at 08:10

## 2023-10-03 RX ADMIN — REMIFENTANIL HYDROCHLORIDE 0.1 MCG/KG/MIN: 1 INJECTION, POWDER, LYOPHILIZED, FOR SOLUTION INTRAVENOUS at 08:10

## 2023-10-03 RX ADMIN — VASOPRESSIN 2 UNITS: 20 INJECTION INTRAVENOUS at 09:10

## 2023-10-03 RX ADMIN — PHENYLEPHRINE HYDROCHLORIDE 200 MCG: 10 INJECTION INTRAVENOUS at 09:10

## 2023-10-03 RX ADMIN — IBUPROFEN 800 MG: 400 TABLET ORAL at 03:10

## 2023-10-03 RX ADMIN — DEXAMETHASONE SODIUM PHOSPHATE 12 MG: 4 INJECTION, SOLUTION INTRAMUSCULAR; INTRAVENOUS at 08:10

## 2023-10-03 RX ADMIN — Medication 100 MG: at 08:10

## 2023-10-03 RX ADMIN — PHENYLEPHRINE HYDROCHLORIDE 200 MCG: 10 INJECTION INTRAVENOUS at 08:10

## 2023-10-03 RX ADMIN — CEFAZOLIN SODIUM 2 G: 2 SOLUTION INTRAVENOUS at 12:10

## 2023-10-03 RX ADMIN — EPHEDRINE SULFATE 10 MG: 50 INJECTION INTRAVENOUS at 09:10

## 2023-10-03 RX ADMIN — CLINDAMYCIN HYDROCHLORIDE 300 MG: 150 CAPSULE ORAL at 09:10

## 2023-10-03 RX ADMIN — VASOPRESSIN 3 UNITS: 20 INJECTION INTRAVENOUS at 09:10

## 2023-10-03 RX ADMIN — ONDANSETRON 4 MG: 2 INJECTION, SOLUTION INTRAMUSCULAR; INTRAVENOUS at 12:10

## 2023-10-03 RX ADMIN — SODIUM CHLORIDE, POTASSIUM CHLORIDE, SODIUM LACTATE AND CALCIUM CHLORIDE: 600; 310; 30; 20 INJECTION, SOLUTION INTRAVENOUS at 09:10

## 2023-10-03 RX ADMIN — DEXTROSE MONOHYDRATE, SODIUM CHLORIDE, AND POTASSIUM CHLORIDE: 50; 9; 1.49 INJECTION, SOLUTION INTRAVENOUS at 07:10

## 2023-10-03 RX ADMIN — GLYCOPYRROLATE 0.2 MG: 0.2 INJECTION, SOLUTION INTRAMUSCULAR; INTRAVITREAL at 08:10

## 2023-10-03 RX ADMIN — SODIUM CHLORIDE, POTASSIUM CHLORIDE, SODIUM LACTATE AND CALCIUM CHLORIDE: 600; 310; 30; 20 INJECTION, SOLUTION INTRAVENOUS at 07:10

## 2023-10-03 RX ADMIN — FENTANYL CITRATE 100 MCG: 50 INJECTION, SOLUTION INTRAMUSCULAR; INTRAVENOUS at 08:10

## 2023-10-03 RX ADMIN — ACETAMINOPHEN 650 MG: 325 TABLET ORAL at 07:10

## 2023-10-03 RX ADMIN — ACETAMINOPHEN 1000 MG: 500 TABLET ORAL at 07:10

## 2023-10-03 RX ADMIN — Medication 80 MG: at 08:10

## 2023-10-03 RX ADMIN — PHENYLEPHRINE HYDROCHLORIDE 100 MCG: 10 INJECTION INTRAVENOUS at 10:10

## 2023-10-03 RX ADMIN — SUCCINYLCHOLINE CHLORIDE 80 MG: 20 INJECTION, SOLUTION INTRAMUSCULAR; INTRAVENOUS at 08:10

## 2023-10-03 RX ADMIN — CEFAZOLIN SODIUM 2 G: 2 SOLUTION INTRAVENOUS at 08:10

## 2023-10-03 NOTE — BRIEF OP NOTE
[facility] - [place] - Surgery  Brief Operative Note    Surgery Date: 10/3/2023     Surgeon(s) and Role:     * Lyudmila Celaya MD - Primary    Assisting Surgeon: None    Pre-op Diagnosis:  Mass of right parotid gland [K11.8]    Post-op Diagnosis:  Post-Op Diagnosis Codes:     * Mass of right parotid gland [K11.8]     * Oropharyngeal lesion [J39.2]    Procedure(s) (LRB):  EXCISION, PAROTID GLAND (Right)  LARYNGOSCOPY, DIRECT (N/A)    Anesthesia: General    Operative Findings: direct laryngoscopy with no mass or lesion; abnormal area of base of tongue seen in clinic resolved, no abnormality to visualization nor palpation on direct laryngoscopy . large parotid mass involving the deep and superficial lobes   Facial nerve identified and preserved, all branches and pes stimulated well at end of case.   Mass abutting mastoid tip but able to dissect easily from the nerve. Nerve did require dissection distally to separate from the mass itself. Unable to preserve great auricular nerve    Estimated Blood Loss: 35 mL         Specimens:   Specimen (24h ago, onward)       Start     Ordered    10/03/23 1159  Specimen to Pathology, Surgery ENT  Once        Comments: Pre-op Diagnosis: Mass of right parotid gland [K11.8]Procedure(s):EXCISION, PAROTID GLANDLARYNGOSCOPY, DIRECT Number of specimens: 1Name of specimens: Right Parotid     References:    Click here for ordering Quick Tip   Question Answer Comment   Procedure Type: ENT    Specimen Class: Routine/Screening    Which provider would you like to cc? LYUDMILA CELAYA    Release to patient Immediate        10/03/23 1158    10/03/23 0927  Specimen to Pathology, Surgery ENT  Once        Comments: Pre-op Diagnosis: Mass of right parotid gland [K11.8]Procedure(s):EXCISION, PAROTID GLANDLARYNGOSCOPY, DIRECT Number of specimens: 2Name of specimens: Lower Skin Margin (Ink side medial), Upper Skin Margin (Ink side medial) For Frozen     References:    Click here for ordering Quick Tip    Question Answer Comment   Procedure Type: ENT    Specimen Class: Known or suspected malignancy    Which provider would you like to cc? PROSPER CELAYA    Release to patient Immediate        10/03/23 0936

## 2023-10-03 NOTE — TRANSFER OF CARE
Anesthesia Transfer of Care Note    Patient: Chanel Esparza    Procedure(s) Performed: Procedure(s) (LRB):  EXCISION, PAROTID GLAND (Right)  LARYNGOSCOPY, DIRECT (N/A)    Patient location: PACU    Anesthesia Type: general    Transport from OR: Transported from OR on room air with adequate spontaneous ventilation    Post pain: adequate analgesia    Post assessment: no apparent anesthetic complications    Post vital signs: stable    Level of consciousness: responds to stimulation    Nausea/Vomiting: no nausea/vomiting    Complications: none    Transfer of care protocol was followed      Last vitals:   Visit Vitals  /61 (BP Location: Right arm, Patient Position: Lying)   Pulse 106   Temp 36.2 °C (97.2 °F) (Axillary)   Resp 16   Wt 82.6 kg (182 lb)   SpO2 (!) 94%   Breastfeeding No   BMI 35.54 kg/m²

## 2023-10-03 NOTE — ANESTHESIA PROCEDURE NOTES
Intubation    Date/Time: 10/3/2023 8:06 AM    Performed by: Vish Roberto CRNA  Authorized by: Leyda Quintero MD    Intubation:     Induction:  Intravenous    Intubated:  Postinduction    Mask Ventilation:  Easy with oral airway    Attempts:  1    Attempted By:  CRNA    Method of Intubation:  Video laryngoscopy    Blade:  Ortega 3    Laryngeal View Grade: Grade I - full view of cords      Difficult Airway Encountered?: No      Complications:  None    Airway Device:  Oral endotracheal tube    Airway Device Size:  6.0    Style/Cuff Inflation:  Cuffed (inflated to minimal occlusive pressure)    Tube secured:  20    Secured at:  The lips    Placement Verified By:  Capnometry    Complicating Factors:  None    Findings Post-Intubation:  BS equal bilateral

## 2023-10-03 NOTE — ANESTHESIA POSTPROCEDURE EVALUATION
Anesthesia Post Evaluation    Patient: Chanel Esparza    Procedure(s) Performed: Procedure(s) (LRB):  EXCISION, PAROTID GLAND (Right)  LARYNGOSCOPY, DIRECT (N/A)    Final Anesthesia Type: general      Patient location during evaluation: PACU  Patient participation: Yes- Able to Participate  Level of consciousness: awake and alert  Post-procedure vital signs: reviewed and stable  Pain management: adequate  Airway patency: patent    PONV status at discharge: No PONV  Anesthetic complications: no      Cardiovascular status: blood pressure returned to baseline  Respiratory status: unassisted  Hydration status: euvolemic            Vitals Value Taken Time   /62 10/03/23 1416   Temp 36.2 °C (97.2 °F) 10/03/23 1303   Pulse 96 10/03/23 1426   Resp 21 10/03/23 1426   SpO2 92 % 10/03/23 1426   Vitals shown include unvalidated device data.      No case tracking events are documented in the log.      Pain/Hima Score: Pain Rating Prior to Med Admin: 0 (10/3/2023  1:30 PM)  Hima Score: 8 (10/3/2023  1:30 PM)

## 2023-10-03 NOTE — OP NOTE
[facility] Surgery  Surgery Department  Operative Note    SUMMARY     Date of Procedure: 10/3/2023     Procedure: Procedure(s) (LRB):  EXCISION, PAROTID GLAND (Right), total  LARYNGOSCOPY, DIRECT (N/A)     Surgeon(s) and Role:     * Lyudmila Barrera MD - Primary    Assisting Surgeon: None    Pre-Operative Diagnosis: Mass of right parotid gland [K11.8]    Post-Operative Diagnosis: Post-Op Diagnosis Codes:     * Mass of right parotid gland [K11.8]     * Oropharyngeal lesion [J39.2]    Anesthesia: General    Operative Findings (including complications, if any):direct laryngoscopy with no mass or lesion; abnormal area of base of tongue seen in clinic resolved, no abnormality to visualization nor palpation on direct laryngoscopy large parotid mass involving the deep and superficial lobes   Facial nerve identified and preserved, all branches and pes stimulated well at end of case.   Mass abutting mastoid tip but able to dissect easily from the nerve. Nerve did require dissection distally to separate from the mass itself. Unable to preserve great auricular nerve      Operative report in detail: The patient was identified in the holding area per routine. She was taken to the operating room and placed supine on the operating table. The patient was intubated transorally by the anesthesiology service, and an adequate level of general endotracheal anesthesia was achieved. The head of the patient's bed was rotated 90 degrees away from anesthesia. Her eyes were protected with tape, and a moist guaze was placed on the alveolar ridge. The patient was draped in the standard fashion for laryngoscopy, and a timeout was performed and the correct patient and procedure were identified.    The stas laryngoscope was inserted in the patient's oral cavity and advanced to visualize the posterior oropharynx, hypopharynx, and endolarynx, with the above findings noted.  Visualization was optimized with the 0 degree telescope.  Photodocumentation was obtained.    The scope and gauze were then removed. This concluded the direct laryngoscopy portion of the procedure.     Next the patient was prepped and draped for parotid  surgery in usual fashion.  The nerve integrity monitoring electrodes were placed for facial nerve monitoring using 4 channels. A modified Lee incision at the preauricular area extending into the neck in the natural skin crease was made using Bovie electrocautery.      Skin flaps were elevated in the appropriate plane to ensure adequate tissue thickness. When approaching the open skin area while raising the flap it became apparent the mass was intimately associated with the skin therefore an incision was made just anterior the drainage site and flap was raised over the mass allowing for skin to be remove en bloc with the mass to avoid spillage of tumor into the wound bed. A portion of skin was also take in circumferential fashion as superior and inferior border for frozen section to ensure no tumor involvement. After the flap was raised, The pretragal plane was opened and the tragal pointer was identified. Next,the tail of the parotid gland was lifted off the sternocleidomastoid muscle allowing exposure of the digastric muscle. The great auricular nerve was not able to be preserved due to its course along the mass going towards the area of skin that needed to be excised. Once the tragal pointer and digastric muscle and been identified, working between these two areas, careful and meticulous dissection was performed until the main trunk of the facial nerve was identified. With care, the nerve was followed forward and branch points were identified. The parotid mass was then carefully mobilized off of the main trunk of the facial nerve. This allowed identification of the pes and this was followed with care superiorly and inferiorly to mobilize the mass further. Dissection continued onto the distal branches of the nerve . The  mass was also going underneath the facial nerve towards the mastoid tip/skull base which was carefully dissected to allow for mobilization from this area. This also required ligation of the retromandibular vein. After completion of this deeper dissection,  full  mobilization of the superficial lobe of the parotid gland and associated mass (The mass was removed with a small cuff of normal parotid tissue) was able to be undertaken. There was also what appeared to be a lymph node associated with inferior portion of mass which was removed with the mass. After careful dissection to preserve the nerve, the tumor was removed. This was passed off of the field and sent to pathology for permanent evaluation. The main trunk and its branches were stimulated after removal of the parotid mass and were working appropriately. The operative site was then irrigated and 10 flat suction drain was placed. Drain was sutured to the skin with 3-0 nylon suture. The skin was closed in multiple layers using 4-0 and 3-0 vicryl suture for deep sutures and 5-0 fast gut for superficial sutures for the incision in the pretragal region . The remainder of the incision was closed with staples after undermining was performed in appropriate planes to allow for closure not under tension .   Bacitracin ointment applied to the closure. Anesthesia was reversed and patient extubated in stable condition. I was present and scrubbed for the entire procedure except for the last portion of the closure which I was immediately available. I performed all key portions of the procedure.     Significant Surgical Tasks Conducted by the Assistant(s), if Applicable: none    Estimated Blood Loss (EBL): 35 mL           Implants: * No implants in log *    Specimens:   Specimen (24h ago, onward)       Start     Ordered    10/03/23 1533  Specimen to Pathology, Surgery ENT  Once        Comments: Pre-op Diagnosis: Mass of right parotid gland [K11.8]Procedure(s):EXCISION,  PAROTID GLANDLARYNGOSCOPY, DIRECT Number of specimens: 1Name of specimens: Right Parotid     References:    Click here for ordering Quick Tip   Question Answer Comment   Procedure Type: ENT    Specimen Class: Routine/Screening    Which provider would you like to cc? PROSPER CELAYA    Release to patient Immediate        10/03/23 1158    10/03/23 0927  Specimen to Pathology, Surgery ENT  Once        Comments: Pre-op Diagnosis: Mass of right parotid gland [K11.8]Procedure(s):EXCISION, PAROTID GLANDLARYNGOSCOPY, DIRECT Number of specimens: 2Name of specimens: Lower Skin Margin (Ink side medial), Upper Skin Margin (Ink side medial) For Frozen     References:    Click here for ordering Quick Tip   Question Answer Comment   Procedure Type: ENT    Specimen Class: Known or suspected malignancy    Which provider would you like to cc? PROSPER CELAYA    Release to patient Immediate        10/03/23 0927                            Condition: Good    Disposition: PACU - hemodynamically stable.    Attestation: I was present and scrubbed for the entire procedure.

## 2023-10-03 NOTE — INTERVAL H&P NOTE
The patient has been examined and the H&P has been reviewed:    I concur with the findings and  changes have occurred since H&P was written.  FNA results reviewed-see separate note in epic from 10-2-23  Anesthesia/Surgery risks, benefits and alternative options discussed and understood by patient/family.          There are no hospital problems to display for this patient.

## 2023-10-04 VITALS
TEMPERATURE: 98 F | BODY MASS INDEX: 35.73 KG/M2 | HEIGHT: 60 IN | OXYGEN SATURATION: 92 % | HEART RATE: 93 BPM | SYSTOLIC BLOOD PRESSURE: 146 MMHG | DIASTOLIC BLOOD PRESSURE: 64 MMHG | WEIGHT: 182 LBS | RESPIRATION RATE: 18 BRPM

## 2023-10-04 PROCEDURE — 25000003 PHARM REV CODE 250: Performed by: OTOLARYNGOLOGY

## 2023-10-04 PROCEDURE — 25000242 PHARM REV CODE 250 ALT 637 W/ HCPCS: Performed by: OTOLARYNGOLOGY

## 2023-10-04 PROCEDURE — 25000003 PHARM REV CODE 250

## 2023-10-04 PROCEDURE — 94640 AIRWAY INHALATION TREATMENT: CPT

## 2023-10-04 PROCEDURE — 94761 N-INVAS EAR/PLS OXIMETRY MLT: CPT

## 2023-10-04 RX ORDER — IBUPROFEN 600 MG/1
600 TABLET ORAL EVERY 6 HOURS PRN
Qty: 30 TABLET | Refills: 0 | Status: SHIPPED | OUTPATIENT
Start: 2023-10-04 | End: 2024-02-28 | Stop reason: ALTCHOICE

## 2023-10-04 RX ORDER — DEXTROMETHORPHAN HYDROBROMIDE, GUAIFENESIN 5; 100 MG/5ML; MG/5ML
650 LIQUID ORAL EVERY 8 HOURS PRN
Qty: 30 TABLET | Refills: 1 | Status: SHIPPED | OUTPATIENT
Start: 2023-10-04 | End: 2024-02-28 | Stop reason: ALTCHOICE

## 2023-10-04 RX ADMIN — HYDROCHLOROTHIAZIDE 25 MG: 25 TABLET ORAL at 08:10

## 2023-10-04 RX ADMIN — CLINDAMYCIN HYDROCHLORIDE 300 MG: 150 CAPSULE ORAL at 08:10

## 2023-10-04 RX ADMIN — BACITRACIN: 500 OINTMENT TOPICAL at 08:10

## 2023-10-04 RX ADMIN — IPRATROPIUM BROMIDE 0.5 MG: 0.5 SOLUTION RESPIRATORY (INHALATION) at 07:10

## 2023-10-04 RX ADMIN — BUDESONIDE 0.5 MG: 0.5 INHALANT RESPIRATORY (INHALATION) at 07:10

## 2023-10-04 RX ADMIN — ARFORMOTEROL TARTRATE 15 MCG: 15 SOLUTION RESPIRATORY (INHALATION) at 07:10

## 2023-10-04 RX ADMIN — LISINOPRIL 20 MG: 20 TABLET ORAL at 08:10

## 2023-10-04 NOTE — NURSING
AVS virtually reviewed with patient and spouse in its entirety with emphasis on medications, follow-up appointments and reasons to return to the ED or contact the [facility]. Patient also encouraged to utilize their patient portal. Ease and convenience of use reiterated. Education complete and patient voiced understanding. All questions answered. Discharge teaching complete.

## 2023-10-04 NOTE — NURSING
Patient cleared for discharge by  and JESSEE Villa. Notified  patient stated she felt jittery after breathing treatment, VSS, patient denies dizziness, lightheadedness, and chest pain. Ok to d/c per .  AVS printed and reviewed with by Alva MEJIA RN. Patient given ointment and DELIO drain teaching. Patient per escorted off unit via wheelchair by nurse and family member.  Left arm PIV removed, catheter tip intact, pressure dressing placed. Patient

## 2023-10-04 NOTE — NURSING
PICC line dressing change done, maintained sterility during process. Hub changed, flushed and saline locked, blood return not present.

## 2023-10-04 NOTE — DISCHARGE INSTRUCTIONS
Ok to shower Do not stand with incision directly under shower head but allow soapy water to run over incision line to keep wound clean  Apply provided ointment to incision line twice a day for 5 days. After 5 days apply vaseline to incision line twice a day     If you have any problems or questions please call the following numbers :  Office hours:  Weekdays 8:00 am to 5:00 pm.  Please call [phone] and ask to speak with  my medical assistant, Della at the [facility].    After-hours & weekends:  Please call [phone] and ask to speak with the ENT resident doctor.    Please call IMMMEDIATELY if you have:  Temperature of 101° F or greater  Swelling of neck or foul smelling drainage  Chest swelling  Difficulty breathing  Pain not relieved by your prescribed pain medication      MEDICATIONS:  Most people need pain medication for after surgery. The best pain control comes from a combination of ACETAMINOPHEN (Tylenol) and IBUPROFEN (Motrin).  You can use any over-the-counter versions of acetaminophen and ibuprofen.  You should take 400 milligrams of ibuprofen every 6 hours and 650 milligrams of tylenol every 6 hours. These can be alternated so that you are taking something about every 3 hours while awake. If you need the narcotic pain medication, substitute this out for the acetaminophen. You should try to only use the narcotic medication if you are having trouble sleeping. This medication has a high addiction potential and we do not have a way to know who is at risk for getting addicted to narcotic pain medication. If you have been advised by another physician to not take either of these medications , do not take.    Some people have problems with bowel movements after surgery. If you have NOT had a bowel movement 3 days after surgery, go to your local pharmacy and purchase an over the counter stool softener such as COLACE. You can also ask the pharmacist for his or her recommendation. If you  still do not have a bowel movement after starting the softener, please call the office.     ACTIVITY:  It is important to walk around often while at home to keep your blood circulating and prevent blood clots.      RESTRICTED ACTIVITIES AFTER SURGERY:  AVOID all heavy lifting, straining or bending for 2 weeks.   AVOID semi-contact sports or vigorous exercising for 3-4 weeks.   Do NOT operate a motor vehicle or any type of heavy machinery within 24 hours of taking pain medication.  DO NOT smoke or be around smokers.

## 2023-10-04 NOTE — NURSING
Pt ambulated with standby assistance to bathroom. Pt reported 6/10 pain, but only requested tylenol; PRN medication was given. Staples intact. DELIO drain noted to bulb suction. Instructed pt to call for assistance.

## 2023-10-04 NOTE — NURSING
[facility]  Nurses Note -- 4 Eyes    Received pt awake on bed. With oxygen support at 2L NC for comfort, not in distress. With IV site on L forearm, ongoing D5 NSS with KCL 20 meq x 20ml/hr. With sutured intact on her right neck, with DELIO drain noted. With scab, dry skin noted on her back, mepilex applied. Pts need attended. Bed in low positon. Oriented x4. HOB elevated. Call light within reach. Will continue to monitor.  10/4/2023       Skin assessed on: Q Shift      [x] No Pressure Injuries Present    []Prevention Measures Documented    [] Yes LDA  for Pressure Injury Previously documented     [] Yes New Pressure Injury Discovered   [] LDA for New Pressure Injury Added      Attending RN:  Maame Fisher RN     Second RN:  Ting Gonzalez RN

## 2023-10-04 NOTE — PLAN OF CARE
Problem: Adult Inpatient Plan of Care  Goal: Plan of Care Review  Outcome: Ongoing, Progressing  Flowsheets (Taken 10/4/2023 0735)  Plan of Care Reviewed With: patient     Problem: Infection  Goal: Absence of Infection Signs and Symptoms  10/4/2023 0735 by Maame Fisher RN  Outcome: Ongoing, Progressing  10/4/2023 0731 by Maame Fisher RN  Outcome: Ongoing, Progressing     Problem: Skin Injury Risk Increased  Goal: Skin Health and Integrity  10/4/2023 0735 by Maame Fisher RN  Outcome: Ongoing, Progressing  10/4/2023 0731 by Maame Fisher RN  Outcome: Ongoing, Progressing     Problem: Impaired Wound Healing  Goal: Optimal Wound Healing  10/4/2023 0735 by Maame Fisher RN  Outcome: Ongoing, Progressing  10/4/2023 0731 by Maame Fisher RN  Outcome: Ongoing, Progressing

## 2023-10-04 NOTE — PLAN OF CARE
Problem: Adult Inpatient Plan of Care  Goal: Plan of Care Review  Outcome: Ongoing, Progressing  Goal: Patient-Specific Goal (Individualized)  Outcome: Ongoing, Progressing  Goal: Absence of Hospital-Acquired Illness or Injury  Outcome: Ongoing, Progressing  Goal: Optimal Comfort and Wellbeing  Outcome: Ongoing, Progressing  Goal: Readiness for Transition of Care  Outcome: Ongoing, Progressing     Problem: Infection  Goal: Absence of Infection Signs and Symptoms  Outcome: Ongoing, Progressing     Problem: Skin Injury Risk Increased  Goal: Skin Health and Integrity  Outcome: Ongoing, Progressing     Problem: Impaired Wound Healing  Goal: Optimal Wound Healing  Outcome: Ongoing, Progressing

## 2023-10-04 NOTE — PLAN OF CARE
Problem: Infection  Goal: Absence of Infection Signs and Symptoms  Outcome: Ongoing, Progressing     Problem: Skin Injury Risk Increased  Goal: Skin Health and Integrity  Outcome: Ongoing, Progressing     Problem: Impaired Wound Healing  Goal: Optimal Wound Healing  Outcome: Ongoing, Progressing

## 2023-10-04 NOTE — PLAN OF CARE
Problem: Adult Inpatient Plan of Care  Goal: Plan of Care Review  Outcome: Adequate for Care Transition  Goal: Patient-Specific Goal (Individualized)  Outcome: Adequate for Care Transition  Goal: Absence of Hospital-Acquired Illness or Injury  Outcome: Adequate for Care Transition  Goal: Optimal Comfort and Wellbeing  Outcome: Adequate for Care Transition  Goal: Readiness for Transition of Care  Outcome: Adequate for Care Transition     Problem: Infection  Goal: Absence of Infection Signs and Symptoms  Outcome: Adequate for Care Transition     Problem: Skin Injury Risk Increased  Goal: Skin Health and Integrity  Outcome: Adequate for Care Transition     Problem: Impaired Wound Healing  Goal: Optimal Wound Healing  Outcome: Adequate for Care Transition

## 2023-10-04 NOTE — NURSING
[facility], [facility]  Nurses Note -- 4 Eyes      10/4/2023       Skin assessed on: Q Shift      [x] No Pressure Injuries Present    [x]Prevention Measures Documented    [] Yes LDA  for Pressure Injury Previously documented     [] Yes New Pressure Injury Discovered   [] LDA for New Pressure Injury Added      Attending RN:  Giuliano Andrews RN     Second RN:  Maame CANNON RN

## 2023-10-04 NOTE — NURSING
[facility], [facility]  Nurses Note -- 4 Eyes      10/3/2023       Skin assessed on: Admit      [x] No Pressure Injuries Present    [x]Prevention Measures Documented    [] Yes LDA  for Pressure Injury Previously documented     [] Yes New Pressure Injury Discovered   [] LDA for New Pressure Injury Added    Blanchable redness  and scabs from eczema to buttocks.     Attending RN:  Sheree Teran RN     Second RN: THOMAS Maguire

## 2023-10-04 NOTE — PLAN OF CARE
10/04/23 0902   Final Note   Assessment Type Final Discharge Note   Anticipated Discharge Disposition Home   Hospital Resources/Appts/Education Provided Appointments scheduled and added to AVS   Post-Acute Status   Post-Acute Authorization Other   Other Status No Post-Acute Service Needs     Pts nurse Giuliano notified pt can d/c from CM standpoint

## 2023-10-06 LAB
FINAL PATHOLOGIC DIAGNOSIS: NORMAL
FROZEN SECTION DIAGNOSIS: NORMAL
FROZEN SECTION FOOTNOTE: NORMAL
GROSS: NORMAL
Lab: NORMAL

## 2023-10-11 NOTE — DISCHARGE SUMMARY
[facility]  Otorhinolaryngology-Head & Neck Surgery  Discharge Summary      Patient Name: Chanel Esparza  MRN: [id number]  Admission Date: 10/3/2023  Hospital Length of Stay: 0 days  Discharge Date and Time: 10/4/2023 11:37 AM  Attending Physician: No att. providers found   Discharging Provider: Lyudmila Barrera MD  Primary Care Provider: Warner Richey MD     HPI: admitted for 23 hr obs after surgery    Procedure(s) (LRB):  EXCISION, PAROTID GLAND (Right)  LARYNGOSCOPY, DIRECT (N/A)     Hospital Course: no events overnight. Did well postop . Tolerating diet, ambulating pain controlled    Consults:     Significant Diagnostic Studies:   Specimen (24h ago, onward)      None            Pending Diagnostic Studies:       Procedure Component Value Units Date/Time    Specimen to Pathology, Surgery ENT [[id number]] Collected: 10/03/23 0927    Order Status: Sent Lab Status: In process Updated: 10/03/23 0928    Specimen: Tissue           Final Active Diagnoses:    Diagnosis Date Noted POA    PRINCIPAL PROBLEM:  Mass of right parotid gland [K11.8] 09/27/2023 Yes      Problems Resolved During this Admission:      Discharged Condition: good    Disposition: Home or Self Care    Follow Up:    Patient Instructions:      Other restrictions (specify):   Order Comments: Do not lift over 5 pounds  No smoking     No dressing needed   Order Comments: Apply ointment provided to incision line twice a day for 5 days. After 5 days apply vaseline to incision line     Notify your health care provider if you experience any of the following:  severe uncontrolled pain     Notify your health care provider if you experience any of the following:  redness, tenderness, or signs of infection (pain, swelling, redness, odor or green/yellow discharge around incision site)     Medications:  Reconciled Home Medications:      Medication List        START taking these medications      acetaminophen 650 MG Tbsr  Commonly known as: TYLENOL  Take 1  tablet (650 mg total) by mouth every 8 (eight) hours as needed (pain).  Notes to patient: Last dose 10/3 at 7:03pm            CHANGE how you take these medications      * ibuprofen 800 MG tablet  Commonly known as: ADVIL,MOTRIN  Take 1 tablet (800 mg total) by mouth every 6 (six) hours as needed for Pain.  What changed: Another medication with the same name was added. Make sure you understand how and when to take each.  Notes to patient: Last dose 10/3 at 330pm     * ibuprofen 600 MG tablet  Commonly known as: ADVIL,MOTRIN  Take 1 tablet (600 mg total) by mouth every 6 (six) hours as needed for Pain.  What changed: You were already taking a medication with the same name, and this prescription was added. Make sure you understand how and when to take each.           * This list has 2 medication(s) that are the same as other medications prescribed for you. Read the directions carefully, and ask your doctor or other care provider to review them with you.                CONTINUE taking these medications      albuterol 90 mcg/actuation inhaler  Commonly known as: PROVENTIL/VENTOLIN HFA  INHALE 2 PUFF INTO LUNGS EVERY 6 HOURS AS NEEDED FOR WHEEZING     albuterol-ipratropium 2.5 mg-0.5 mg/3 mL nebulizer solution  Commonly known as: DUO-NEB  Take 3 mLs by nebulization every 6 (six) hours as needed for Wheezing or Shortness of Breath. Rescue     aspirin 81 MG EC tablet  Commonly known as: ECOTRIN  Take 81 mg by mouth once daily.     clotrimazole 1 % cream  Commonly known as: LOTRIMIN  Apply topically 2 (two) times daily. For 14 days     fluticasone-umeclidin-vilanter 100-62.5-25 mcg Dsdv  Commonly known as: TRELEGY ELLIPTA  Inhale 1 puff into the lungs once daily.     lisinopriL-hydrochlorothiazide 20-25 mg Tab  Commonly known as: PRINZIDE,ZESTORETIC  Take 1 tablet by mouth once daily.     nystatin powder  Commonly known as: MYCOSTATIN  Apply topically once daily.     tiZANidine 4 MG tablet  Commonly known as: ZANAFLEX  Take  1 tablet (4 mg total) by mouth every 6 (six) hours as needed (jaw pain).     ZYRTEC ORAL  Take by mouth.            STOP taking these medications      clindamycin 300 MG capsule  Commonly known as: CAESAR Barrera MD  Otorhinolaryngology-Head & Neck Surgery  [facility]

## 2023-10-12 ENCOUNTER — OFFICE VISIT (OUTPATIENT)
Dept: OTOLARYNGOLOGY | Facility: CLINIC | Age: 68
End: 2023-10-12
Payer: MEDICARE

## 2023-10-12 VITALS — BODY MASS INDEX: 35.54 KG/M2 | SYSTOLIC BLOOD PRESSURE: 180 MMHG | WEIGHT: 182 LBS | DIASTOLIC BLOOD PRESSURE: 87 MMHG

## 2023-10-12 DIAGNOSIS — D11.9 WARTHIN TUMOR: Primary | ICD-10-CM

## 2023-10-12 PROCEDURE — 3044F PR MOST RECENT HEMOGLOBIN A1C LEVEL <7.0%: ICD-10-PCS | Mod: CPTII,S$GLB,, | Performed by: OTOLARYNGOLOGY

## 2023-10-12 PROCEDURE — 1101F PT FALLS ASSESS-DOCD LE1/YR: CPT | Mod: CPTII,S$GLB,, | Performed by: OTOLARYNGOLOGY

## 2023-10-12 PROCEDURE — 3077F SYST BP >= 140 MM HG: CPT | Mod: CPTII,S$GLB,, | Performed by: OTOLARYNGOLOGY

## 2023-10-12 PROCEDURE — 99024 POSTOP FOLLOW-UP VISIT: CPT | Mod: S$GLB,,, | Performed by: OTOLARYNGOLOGY

## 2023-10-12 PROCEDURE — 3079F PR MOST RECENT DIASTOLIC BLOOD PRESSURE 80-89 MM HG: ICD-10-PCS | Mod: CPTII,S$GLB,, | Performed by: OTOLARYNGOLOGY

## 2023-10-12 PROCEDURE — 1126F PR PAIN SEVERITY QUANTIFIED, NO PAIN PRESENT: ICD-10-PCS | Mod: CPTII,S$GLB,, | Performed by: OTOLARYNGOLOGY

## 2023-10-12 PROCEDURE — 1159F PR MEDICATION LIST DOCUMENTED IN MEDICAL RECORD: ICD-10-PCS | Mod: CPTII,S$GLB,, | Performed by: OTOLARYNGOLOGY

## 2023-10-12 PROCEDURE — 4010F PR ACE/ARB THEARPY RXD/TAKEN: ICD-10-PCS | Mod: CPTII,S$GLB,, | Performed by: OTOLARYNGOLOGY

## 2023-10-12 PROCEDURE — 3079F DIAST BP 80-89 MM HG: CPT | Mod: CPTII,S$GLB,, | Performed by: OTOLARYNGOLOGY

## 2023-10-12 PROCEDURE — 3044F HG A1C LEVEL LT 7.0%: CPT | Mod: CPTII,S$GLB,, | Performed by: OTOLARYNGOLOGY

## 2023-10-12 PROCEDURE — 4010F ACE/ARB THERAPY RXD/TAKEN: CPT | Mod: CPTII,S$GLB,, | Performed by: OTOLARYNGOLOGY

## 2023-10-12 PROCEDURE — 3288F PR FALLS RISK ASSESSMENT DOCUMENTED: ICD-10-PCS | Mod: CPTII,S$GLB,, | Performed by: OTOLARYNGOLOGY

## 2023-10-12 PROCEDURE — 3288F FALL RISK ASSESSMENT DOCD: CPT | Mod: CPTII,S$GLB,, | Performed by: OTOLARYNGOLOGY

## 2023-10-12 PROCEDURE — 1126F AMNT PAIN NOTED NONE PRSNT: CPT | Mod: CPTII,S$GLB,, | Performed by: OTOLARYNGOLOGY

## 2023-10-12 PROCEDURE — 1159F MED LIST DOCD IN RCRD: CPT | Mod: CPTII,S$GLB,, | Performed by: OTOLARYNGOLOGY

## 2023-10-12 PROCEDURE — 3077F PR MOST RECENT SYSTOLIC BLOOD PRESSURE >= 140 MM HG: ICD-10-PCS | Mod: CPTII,S$GLB,, | Performed by: OTOLARYNGOLOGY

## 2023-10-12 PROCEDURE — 1101F PR PT FALLS ASSESS DOC 0-1 FALLS W/OUT INJ PAST YR: ICD-10-PCS | Mod: CPTII,S$GLB,, | Performed by: OTOLARYNGOLOGY

## 2023-10-12 PROCEDURE — 99024 PR POST-OP FOLLOW-UP VISIT: ICD-10-PCS | Mod: S$GLB,,, | Performed by: OTOLARYNGOLOGY

## 2023-10-12 NOTE — PROGRESS NOTES
ENT POSTOP CLINIC NOTE    SUBJECTIVE:   pt here for postop visit s/p right parotidectomy on 10-3 -23.   Has not been smoking    OBJECTIVE:  General: no acute distress  Head: normocephalic  Face: normal facial motion HB 1/6  Neck: modified med incision healing well- slight dusky area postuaricular less than 0.5 cm. Crusting cleaned with peroxide   Respiratory: nonlabored , no stridor or stertor      Pathology  Right neck mass, lower skin margin, excision:   Benign skin and underlying subcutaneous tissue   No evidence of malignancy     2. Right neck mass, upper skin margin, excision:   Benign skin with underlying subcutaneous tissue with solar elastosis and chronic inflammation   No evidence of malignancy     3. Right parotid, resection:   Warthin tumors (papillary cystadenoma lymphomatosum), multifocal (3.5 cm and 1.8 cm in size by gross dimension), completely excised   Portion of ulcerated skin with underlying subcutaneous abscess with necrosis   One intraparotid lymph node   No evidence of malignancy   IMPRESSION:  s/p right parotidectomy on 10-3 -23.     PLAN:  Doing well postop. All questions answered.   Follow up 2 weeks to recheck incision to ensure no breakdown . Sooner if issue  Reviewed wound care instructions.

## 2023-10-27 ENCOUNTER — OFFICE VISIT (OUTPATIENT)
Dept: OTOLARYNGOLOGY | Facility: CLINIC | Age: 68
End: 2023-10-27
Payer: MEDICARE

## 2023-10-27 DIAGNOSIS — D11.9 WARTHIN TUMOR: Primary | ICD-10-CM

## 2023-10-27 PROCEDURE — 99024 PR POST-OP FOLLOW-UP VISIT: ICD-10-PCS | Mod: S$GLB,,, | Performed by: OTOLARYNGOLOGY

## 2023-10-27 PROCEDURE — 99024 POSTOP FOLLOW-UP VISIT: CPT | Mod: S$GLB,,, | Performed by: OTOLARYNGOLOGY

## 2023-10-27 PROCEDURE — 4010F ACE/ARB THERAPY RXD/TAKEN: CPT | Mod: CPTII,S$GLB,, | Performed by: OTOLARYNGOLOGY

## 2023-10-27 PROCEDURE — 4010F PR ACE/ARB THEARPY RXD/TAKEN: ICD-10-PCS | Mod: CPTII,S$GLB,, | Performed by: OTOLARYNGOLOGY

## 2023-10-27 PROCEDURE — 3044F PR MOST RECENT HEMOGLOBIN A1C LEVEL <7.0%: ICD-10-PCS | Mod: CPTII,S$GLB,, | Performed by: OTOLARYNGOLOGY

## 2023-10-27 PROCEDURE — 3044F HG A1C LEVEL LT 7.0%: CPT | Mod: CPTII,S$GLB,, | Performed by: OTOLARYNGOLOGY

## 2023-10-27 NOTE — PROGRESS NOTES
ENT POSTOP CLINIC NOTE     SUBJECTIVE:   pt here for postop visit s/p right parotidectomy on 10-3 -23. Here today with son who was at prior apptmt as well   Has not been smoking still. Here for second postop visit     OBJECTIVE:  General: no acute distress  Head: normocephalic  Face: normal facial motion HB 1/6  Neck: modified med incision healing well still with crusts along incision line but improving. Dusky skin resolved   Respiratory: nonlabored , no stridor or stertor    IMPRESSION:  s/p right parotidectomy on 10-3 -23.      PLAN:  Will do one additional close check of incision given smoking history and other risks but overall healing really well. Notify me for sooner apptmt if issue.

## 2023-11-10 ENCOUNTER — OFFICE VISIT (OUTPATIENT)
Dept: OTOLARYNGOLOGY | Facility: CLINIC | Age: 68
End: 2023-11-10
Payer: MEDICARE

## 2023-11-10 VITALS — HEIGHT: 60 IN | BODY MASS INDEX: 35.73 KG/M2 | WEIGHT: 182 LBS

## 2023-11-10 DIAGNOSIS — D11.9 WARTHIN TUMOR: Primary | ICD-10-CM

## 2023-11-10 PROCEDURE — 3288F FALL RISK ASSESSMENT DOCD: CPT | Mod: CPTII,S$GLB,, | Performed by: OTOLARYNGOLOGY

## 2023-11-10 PROCEDURE — 1159F PR MEDICATION LIST DOCUMENTED IN MEDICAL RECORD: ICD-10-PCS | Mod: CPTII,S$GLB,, | Performed by: OTOLARYNGOLOGY

## 2023-11-10 PROCEDURE — 3044F PR MOST RECENT HEMOGLOBIN A1C LEVEL <7.0%: ICD-10-PCS | Mod: CPTII,S$GLB,, | Performed by: OTOLARYNGOLOGY

## 2023-11-10 PROCEDURE — 99024 PR POST-OP FOLLOW-UP VISIT: ICD-10-PCS | Mod: S$GLB,,, | Performed by: OTOLARYNGOLOGY

## 2023-11-10 PROCEDURE — 1101F PR PT FALLS ASSESS DOC 0-1 FALLS W/OUT INJ PAST YR: ICD-10-PCS | Mod: CPTII,S$GLB,, | Performed by: OTOLARYNGOLOGY

## 2023-11-10 PROCEDURE — 1126F PR PAIN SEVERITY QUANTIFIED, NO PAIN PRESENT: ICD-10-PCS | Mod: CPTII,S$GLB,, | Performed by: OTOLARYNGOLOGY

## 2023-11-10 PROCEDURE — 99024 POSTOP FOLLOW-UP VISIT: CPT | Mod: S$GLB,,, | Performed by: OTOLARYNGOLOGY

## 2023-11-10 PROCEDURE — 3044F HG A1C LEVEL LT 7.0%: CPT | Mod: CPTII,S$GLB,, | Performed by: OTOLARYNGOLOGY

## 2023-11-10 PROCEDURE — 4010F ACE/ARB THERAPY RXD/TAKEN: CPT | Mod: CPTII,S$GLB,, | Performed by: OTOLARYNGOLOGY

## 2023-11-10 PROCEDURE — 4010F PR ACE/ARB THEARPY RXD/TAKEN: ICD-10-PCS | Mod: CPTII,S$GLB,, | Performed by: OTOLARYNGOLOGY

## 2023-11-10 PROCEDURE — 1159F MED LIST DOCD IN RCRD: CPT | Mod: CPTII,S$GLB,, | Performed by: OTOLARYNGOLOGY

## 2023-11-10 PROCEDURE — 1101F PT FALLS ASSESS-DOCD LE1/YR: CPT | Mod: CPTII,S$GLB,, | Performed by: OTOLARYNGOLOGY

## 2023-11-10 PROCEDURE — 1126F AMNT PAIN NOTED NONE PRSNT: CPT | Mod: CPTII,S$GLB,, | Performed by: OTOLARYNGOLOGY

## 2023-11-10 PROCEDURE — 3288F PR FALLS RISK ASSESSMENT DOCUMENTED: ICD-10-PCS | Mod: CPTII,S$GLB,, | Performed by: OTOLARYNGOLOGY

## 2023-11-10 NOTE — PROGRESS NOTES
Postauricular incision healing well. No dusky areas. Very small crust less than 1 cm  Face movement normal    Advised to try to get denture refit once all of swelling goes down etc but should check with dentist now and see if anything can do to alter the current denture she has to prevent rubbing,.  F/u 2-3 months sooner if issue  Continue current wound care until skin under crusted area fully healed

## 2023-11-20 ENCOUNTER — PATIENT OUTREACH (OUTPATIENT)
Dept: ADMINISTRATIVE | Facility: HOSPITAL | Age: 68
End: 2023-11-20
Payer: MEDICARE

## 2023-12-01 DIAGNOSIS — I10 ESSENTIAL HYPERTENSION: ICD-10-CM

## 2023-12-01 RX ORDER — LISINOPRIL AND HYDROCHLOROTHIAZIDE 20; 25 MG/1; MG/1
1 TABLET ORAL
Qty: 90 TABLET | Refills: 3 | Status: SHIPPED | OUTPATIENT
Start: 2023-12-01

## 2023-12-01 NOTE — TELEPHONE ENCOUNTER
No care due was identified.  [facility] Embedded Care Due Messages. Reference number: [id number].   12/01/2023 4:27:56 PM CST

## 2023-12-18 ENCOUNTER — PATIENT MESSAGE (OUTPATIENT)
Dept: ADMINISTRATIVE | Facility: HOSPITAL | Age: 68
End: 2023-12-18
Payer: MEDICARE

## 2023-12-18 ENCOUNTER — PATIENT OUTREACH (OUTPATIENT)
Dept: ADMINISTRATIVE | Facility: HOSPITAL | Age: 68
End: 2023-12-18
Payer: MEDICARE

## 2023-12-18 NOTE — PROGRESS NOTES
LVM for pt to call back and schedule a nurse BP visit, visit with PCP or give a home BP reading. Gap report updated. Immunization's updated/triggered.

## 2024-02-14 ENCOUNTER — OFFICE VISIT (OUTPATIENT)
Dept: OTOLARYNGOLOGY | Facility: CLINIC | Age: 69
End: 2024-02-14
Payer: MEDICARE

## 2024-02-14 VITALS — BODY MASS INDEX: 35.73 KG/M2 | HEIGHT: 60 IN | WEIGHT: 182 LBS

## 2024-02-14 DIAGNOSIS — H61.21 IMPACTED CERUMEN OF RIGHT EAR: ICD-10-CM

## 2024-02-14 DIAGNOSIS — D11.9 WARTHIN TUMOR: Primary | ICD-10-CM

## 2024-02-14 PROCEDURE — 99213 OFFICE O/P EST LOW 20 MIN: CPT | Mod: 25,S$GLB,, | Performed by: OTOLARYNGOLOGY

## 2024-02-14 PROCEDURE — 69210 REMOVE IMPACTED EAR WAX UNI: CPT | Mod: S$GLB,,, | Performed by: OTOLARYNGOLOGY

## 2024-02-14 NOTE — PROGRESS NOTES
OTOLARYNGOLOGY CLINIC NOTE  Date:  2024     Chief complaint:  Chief Complaint   Patient presents with    Follow-up     4 mo f/u s/p Right parotidectomy       History of Present Illness  Chanel Esparza is a 68 y.o. female  presenting today for a followup.  No otorrhea but ear canal feels dry ; ear also feels full uses pen cap to clean ear  Had questionable abnormality on base of tongue and this was examined with direct laryngoscopy and abnormality resolved ( had come up with acute symptoms when she first presented) has not had throat pain any more. She had dl at time of parotid surgery 10-3-23. 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 Final path warthins       No throat pain today, no swallowing issues. Still with some numbness but improving. Ear canal has been itching    I last saw the patient on  - . Below text is copied from  note on that date describing history of present illness at that time :      Past Medical History  Past Medical History:   Diagnosis Date    Asthma     Emphysema of lung     Hypertension         Past Surgical History  Past Surgical History:   Procedure Laterality Date     SECTION      CHOLECYSTECTOMY      DIRECT LARYNGOSCOPY N/A 10/3/2023    Procedure: LARYNGOSCOPY, DIRECT;  Surgeon: Lyudmila Barrera MD;  Location: [facility] OR;  Service: ENT;  Laterality: N/A;    EXCISION OF PAROTID GLAND Right 10/3/2023    Procedure: EXCISION, PAROTID GLAND;  Surgeon: Lyudmila Barrera MD;  Location: [facility] OR;  Service: ENT;  Laterality: Right;  NEUROMONITORING CAITY ANGELO [phone]    RN PREOP 2023---CONSENTS INCOMPLETE    HYSTERECTOMY      CAPRICE        Medications  Current Outpatient Medications on File Prior to Visit   Medication Sig Dispense Refill    albuterol (PROVENTIL/VENTOLIN HFA) 90 mcg/actuation inhaler INHALE 2 PUFF INTO LUNGS EVERY 6 HOURS AS NEEDED FOR WHEEZING 25.5 g 3    albuterol-ipratropium (DUO-NEB) 2.5 mg-0.5 mg/3 mL nebulizer solution Take 3 mLs by  nebulization every 6 (six) hours as needed for Wheezing or Shortness of Breath. Rescue 60 mL 5    aspirin (ECOTRIN) 81 MG EC tablet Take 81 mg by mouth once daily.      CETIRIZINE HCL (ZYRTEC ORAL) Take by mouth.      clotrimazole (LOTRIMIN) 1 % cream Apply topically 2 (two) times daily. For 14 days 60 g 0    fluticasone-umeclidin-vilanter (TRELEGY ELLIPTA) 100-62.5-25 mcg DsDv Inhale 1 puff into the lungs once daily. 180 each 3    ibuprofen (ADVIL,MOTRIN) 800 MG tablet Take 1 tablet (800 mg total) by mouth every 6 (six) hours as needed for Pain. 20 tablet 1    lisinopriL-hydrochlorothiazide (PRINZIDE,ZESTORETIC) 20-25 mg Tab TAKE 1 TABLET BY MOUTH EVERY DAY 90 tablet 3    nystatin (MYCOSTATIN) powder Apply topically once daily. 60 g 3    acetaminophen (TYLENOL) 650 MG TbSR Take 1 tablet (650 mg total) by mouth every 8 (eight) hours as needed (pain). 30 tablet 1    ibuprofen (ADVIL,MOTRIN) 600 MG tablet Take 1 tablet (600 mg total) by mouth every 6 (six) hours as needed for Pain. 30 tablet 0    tiZANidine (ZANAFLEX) 4 MG tablet Take 1 tablet (4 mg total) by mouth every 6 (six) hours as needed (jaw pain). 40 tablet 0     No current facility-administered medications on file prior to visit.       Review of Systems  Review of Systems   Constitutional: Negative.    HENT: Negative.     Eyes: Negative.    Respiratory: Negative.     Cardiovascular: Negative.    Gastrointestinal: Negative.    Genitourinary: Negative.    Musculoskeletal: Negative.    Skin: Negative.    Neurological: Negative.    Psychiatric/Behavioral: Negative.        Answers submitted by the patient for this visit:  Review of Symptoms Questionnaire  (Submitted on 2/14/2024)  Seasonal Allergies?: Yes      Social History   reports that she has been smoking cigarettes. She has never used smokeless tobacco. She reports that she does not drink alcohol and does not use drugs.     Family History  Family History   Problem Relation Age of Onset    Hypertension  Mother         Physical Exam   There were no vitals filed for this visit. Body mass index is 35.54 kg/m².  Weight: 82.6 kg (182 lb)   Height: 5' (152.4 cm)     GENERAL: no acute distress.  HEAD: normocephalic.   EYES: No scleral icterus  EARS: external ear without lesion, normal pinna shape and position.  Left External auditory canal with normal cerumen, tympanic membrane fully visible, no perforation , no retraction. No middle ear effusion. Ossicles intact. Right cerumen impaction   NOSE: external nose without significant bony abnormality  ORAL CAVITY/OROPHARYNX: tongue mobile.   NECK: trachea midline. Modified med well healed with hypertrophic scar postauricular.   LYMPH NODES:No cervical lymphadenopathy.  RESPIRATORY: no stridor, no stertor. Respirations nonlabored.  NEURO: alert, responds to questions appropriately.    PSYCH:mood appropriate    Procedure Note   Procedure performed: microscopic examination of ears with cerumen disimpaction    Indication for procedure: unilateral cerumen impaction     Description of procedure:  After verbal consent was obtained, the patient was positioned in semi recumbent position and speculum was placed in the right ear and microscope brought into the field.  The microscope was positioned and magnification adjusted for appropriate visualization. Otologic instruments including various size otologic suctions and curette were used to remove the cerumen from the right external auditory canals under visualization with the operating microscope. After cleaning, visualization was again performed and at various levels of higher magnification to optimize views of the ear canal, tympanic membrane, ossicles and middle ear space. Findings as indicated below. All portions of the procedure and examination by otomicroscopy were tolerated well without complication.     Findings:   Complete cerumen impaction removed entirely revealing normal external auditory canal; tympanic membrane without  bulging, retraction, or perforation; no evidence of middle ear fluid or effusion.         Imaging:  The patient does not have any new imaging of the head and neck since last visit.     Labs:  CBC  Recent Labs   Lab 05/15/23  0754 08/30/23  0828 09/26/23  1205   WBC 7.91 7.22 10.63   Hemoglobin 14.9 15.1 14.5   Hematocrit 47.3 47.3 43.6   MCV 93 93 90   Platelets 214 191 212     BMP  Recent Labs   Lab 05/15/23  0754 08/30/23  0828 09/26/23  1205   Glucose 105 98 90   Sodium 140 141 141   Potassium 4.5 4.1 4.1   Chloride 102 105 101   CO2 31 H 26 27   BUN 14 17 14   Creatinine 0.9 0.9 0.8   Calcium 9.8 9.4 9.8     COAGS        Assessment  1. Warthin tumor    2. Impacted cerumen of right ear       Plan:  Discussed plan of care with patient in detail and all questions answered. Patient reported understanding of plan of care. I gave the patient the opportunity to ask questions and patient confirmed all questions answered to satisfaction.     Will do one additional check per preference , if no issues can f/u prn  Notify if throat pain recurs  Healing as expected  No contralateral parotid mass on imaging done prior, no recurrence or new mass on exam today   Reviewed again about expectations postop   Counseled on cerumen impactions and not to use instrumentation in ear  If still with itching can try dermotic but does not have otitis externa appearance and suspect from nerve so not sure if would help but could try. She does feel better now that wax cleaned.     F/u 4-6 months sooner if issue    I spent a total of 20 minutes on the day of the visit.  This includes face to face time and non-face to face time preparing to see the patient (eg, review of tests), obtaining and/or reviewing separately obtained history, documenting clinical information in the electronic or other health record, independently interpreting results and communicating results to the patient/family/caregiver, or care coordinator.   Please be aware that  this note has been generated with the assistance of [employer] voice-to-text.  Please excuse any spelling or grammatical errors.

## 2024-02-28 ENCOUNTER — LAB VISIT (OUTPATIENT)
Dept: LAB | Facility: HOSPITAL | Age: 69
End: 2024-02-28
Attending: INTERNAL MEDICINE
Payer: MEDICARE

## 2024-02-28 ENCOUNTER — OFFICE VISIT (OUTPATIENT)
Dept: FAMILY MEDICINE | Facility: CLINIC | Age: 69
End: 2024-02-28
Payer: MEDICARE

## 2024-02-28 VITALS
TEMPERATURE: 98 F | HEIGHT: 60 IN | BODY MASS INDEX: 36.14 KG/M2 | OXYGEN SATURATION: 97 % | DIASTOLIC BLOOD PRESSURE: 64 MMHG | HEART RATE: 118 BPM | SYSTOLIC BLOOD PRESSURE: 126 MMHG | WEIGHT: 184.06 LBS

## 2024-02-28 DIAGNOSIS — I70.0 AORTIC ATHEROSCLEROSIS: ICD-10-CM

## 2024-02-28 DIAGNOSIS — I10 ESSENTIAL HYPERTENSION: ICD-10-CM

## 2024-02-28 DIAGNOSIS — J41.0 SIMPLE CHRONIC BRONCHITIS: ICD-10-CM

## 2024-02-28 DIAGNOSIS — R00.2 PALPITATIONS: ICD-10-CM

## 2024-02-28 DIAGNOSIS — E66.01 MORBID OBESITY: ICD-10-CM

## 2024-02-28 DIAGNOSIS — I10 ESSENTIAL HYPERTENSION: Primary | ICD-10-CM

## 2024-02-28 PROBLEM — D69.2 OTHER NONTHROMBOCYTOPENIC PURPURA: Status: RESOLVED | Noted: 2023-01-24 | Resolved: 2024-02-28

## 2024-02-28 LAB
ALBUMIN SERPL BCP-MCNC: 3.7 G/DL (ref 3.5–5.2)
ALP SERPL-CCNC: 62 U/L (ref 55–135)
ALT SERPL W/O P-5'-P-CCNC: 27 U/L (ref 10–44)
ANION GAP SERPL CALC-SCNC: 9 MMOL/L (ref 8–16)
AST SERPL-CCNC: 28 U/L (ref 10–40)
BASOPHILS # BLD AUTO: 0.07 K/UL (ref 0–0.2)
BASOPHILS NFR BLD: 0.7 % (ref 0–1.9)
BILIRUB SERPL-MCNC: 0.6 MG/DL (ref 0.1–1)
BUN SERPL-MCNC: 35 MG/DL (ref 8–23)
CALCIUM SERPL-MCNC: 9.7 MG/DL (ref 8.7–10.5)
CHLORIDE SERPL-SCNC: 103 MMOL/L (ref 95–110)
CO2 SERPL-SCNC: 28 MMOL/L (ref 23–29)
CREAT SERPL-MCNC: 1.4 MG/DL (ref 0.5–1.4)
DIFFERENTIAL METHOD BLD: ABNORMAL
EOSINOPHIL # BLD AUTO: 0.2 K/UL (ref 0–0.5)
EOSINOPHIL NFR BLD: 1.7 % (ref 0–8)
ERYTHROCYTE [DISTWIDTH] IN BLOOD BY AUTOMATED COUNT: 13.8 % (ref 11.5–14.5)
EST. GFR  (NO RACE VARIABLE): 40 ML/MIN/1.73 M^2
GLUCOSE SERPL-MCNC: 98 MG/DL (ref 70–110)
HCT VFR BLD AUTO: 47.2 % (ref 37–48.5)
HGB BLD-MCNC: 15.3 G/DL (ref 12–16)
IMM GRANULOCYTES # BLD AUTO: 0.04 K/UL (ref 0–0.04)
IMM GRANULOCYTES NFR BLD AUTO: 0.4 % (ref 0–0.5)
LYMPHOCYTES # BLD AUTO: 1.9 K/UL (ref 1–4.8)
LYMPHOCYTES NFR BLD: 17.7 % (ref 18–48)
MCH RBC QN AUTO: 28 PG (ref 27–31)
MCHC RBC AUTO-ENTMCNC: 32.4 G/DL (ref 32–36)
MCV RBC AUTO: 86 FL (ref 82–98)
MONOCYTES # BLD AUTO: 1.2 K/UL (ref 0.3–1)
MONOCYTES NFR BLD: 11.2 % (ref 4–15)
NEUTROPHILS # BLD AUTO: 7.3 K/UL (ref 1.8–7.7)
NEUTROPHILS NFR BLD: 68.3 % (ref 38–73)
NRBC BLD-RTO: 0 /100 WBC
PLATELET # BLD AUTO: 216 K/UL (ref 150–450)
PMV BLD AUTO: 12.1 FL (ref 9.2–12.9)
POTASSIUM SERPL-SCNC: 4.1 MMOL/L (ref 3.5–5.1)
PROT SERPL-MCNC: 7.8 G/DL (ref 6–8.4)
RBC # BLD AUTO: 5.46 M/UL (ref 4–5.4)
SODIUM SERPL-SCNC: 140 MMOL/L (ref 136–145)
WBC # BLD AUTO: 10.66 K/UL (ref 3.9–12.7)

## 2024-02-28 PROCEDURE — 85025 COMPLETE CBC W/AUTO DIFF WBC: CPT | Performed by: INTERNAL MEDICINE

## 2024-02-28 PROCEDURE — 36415 COLL VENOUS BLD VENIPUNCTURE: CPT | Mod: PN | Performed by: INTERNAL MEDICINE

## 2024-02-28 PROCEDURE — 99214 OFFICE O/P EST MOD 30 MIN: CPT | Mod: S$GLB,,, | Performed by: INTERNAL MEDICINE

## 2024-02-28 PROCEDURE — 80053 COMPREHEN METABOLIC PANEL: CPT | Performed by: INTERNAL MEDICINE

## 2024-02-28 PROCEDURE — 99999 PR PBB SHADOW E&M-EST. PATIENT-LVL IV: CPT | Mod: PBBFAC,,, | Performed by: INTERNAL MEDICINE

## 2024-02-28 NOTE — PROGRESS NOTES
"Subjective:       Patient ID: Chanel Esparza is a 68 y.o. female.    Chief Complaint: Follow-up and Abnormal ECG    F/u chronic conditions    HPI: 69 y/o w/ HTN COPD tobacco dependence presents with son fro scheduled followu p. In Oct 2023 had excision of right parotid gland with ENT has recovered from this breathign "fine" using trelegy inhaler daily no cough son reports she will occasionally has periods of "dizziness" no association with position change she attributes this to being nervous. No syncope no LE swelling no orthopnea or PND      Review of Systems   Constitutional:  Negative for activity change, appetite change, fatigue, fever and unexpected weight change.   HENT:  Negative for ear pain, rhinorrhea and sore throat.    Eyes:  Negative for discharge and visual disturbance.   Respiratory:  Negative for chest tightness, shortness of breath and wheezing.    Cardiovascular:  Negative for chest pain, palpitations and leg swelling.   Gastrointestinal:  Negative for abdominal pain, constipation and diarrhea.   Endocrine: Negative for cold intolerance and heat intolerance.   Genitourinary:  Negative for dysuria and hematuria.   Musculoskeletal:  Negative for joint swelling and neck stiffness.   Skin:  Negative for rash.   Neurological:  Negative for dizziness, syncope, weakness and headaches.   Psychiatric/Behavioral:  Negative for suicidal ideas.        Objective:     Vitals:    02/28/24 1352   BP: 126/64   BP Location: Right arm   Patient Position: Sitting   BP Method: X-Large (Manual)   Pulse: (!) 118   Temp: 97.9 °F (36.6 °C)   TempSrc: Oral   SpO2: 97%   Weight: 83.5 kg (184 lb 1.4 oz)   Height: 5' (1.524 m)          Physical Exam  Constitutional:       Appearance: She is well-developed.   HENT:      Head: Normocephalic and atraumatic.   Eyes:      Conjunctiva/sclera: Conjunctivae normal.   Cardiovascular:      Rate and Rhythm: Normal rate and regular rhythm.      Heart sounds: Murmur heard.      No friction rub. " No gallop.      Comments: Rate in 90s and regularlly lsb III/VI holosystolic murmur no radiation no change with deep inspiration  Pulmonary:      Effort: Pulmonary effort is normal.      Breath sounds: Normal breath sounds. No wheezing or rales.   Abdominal:      Palpations: Abdomen is soft.      Tenderness: There is no abdominal tenderness. There is no guarding or rebound.   Musculoskeletal:         General: No tenderness. Normal range of motion.      Cervical back: Normal range of motion.   Skin:     General: Skin is warm and dry.   Neurological:      Mental Status: She is alert and oriented to person, place, and time.      Cranial Nerves: No cranial nerve deficit.         Assessment and Plan   1. Essential hypertension  Bp at goal labs to monitor for end organ dysfunction  - CBC Auto Differential; Future  - Comprehensive Metabolic Panel; Future  - Echo; Future    2. Simple chronic bronchitis  Continue trelegy inhaler    3. Morbid obesity  The patient is asked to make an attempt to improve diet and exercise patterns to aid in medical management of this problem.      4. Aortic atherosclerosis  She is intolerant of all statins resume low dose asa    5. Palpitations  With systolic murmur suspicous for AS check echocardiogram  - Echo; Future

## 2024-03-06 ENCOUNTER — HOSPITAL ENCOUNTER (OUTPATIENT)
Dept: CARDIOLOGY | Facility: HOSPITAL | Age: 69
Discharge: HOME | End: 2024-03-06
Attending: INTERNAL MEDICINE
Payer: MEDICARE

## 2024-03-06 DIAGNOSIS — J42 CHRONIC BRONCHITIS, UNSPECIFIED CHRONIC BRONCHITIS TYPE: ICD-10-CM

## 2024-03-06 DIAGNOSIS — R00.2 PALPITATIONS: ICD-10-CM

## 2024-03-06 DIAGNOSIS — I10 ESSENTIAL HYPERTENSION: ICD-10-CM

## 2024-03-06 LAB
ASCENDING AORTA: 2.85 CM
AV INDEX (PROSTH): 0.3
AV MEAN GRADIENT: 24 MMHG
AV PEAK GRADIENT: 39 MMHG
AV REGURGITATION PRESSURE HALF TIME: 387.76 MS
AV VALVE AREA BY VELOCITY RATIO: 1.02 CM²
AV VALVE AREA: 0.98 CM²
AV VELOCITY RATIO: 0.32
CV ECHO LV RWT: 0.51 CM
DOP CALC AO PEAK VEL: 3.14 M/S
DOP CALC AO VTI: 79.4 CM
DOP CALC LVOT AREA: 3.2 CM2
DOP CALC LVOT DIAMETER: 2.03 CM
DOP CALC LVOT PEAK VEL: 0.99 M/S
DOP CALC LVOT STROKE VOLUME: 77.64 CM3
DOP CALC MV VTI: 29.3 CM
DOP CALCLVOT PEAK VEL VTI: 24 CM
E WAVE DECELERATION TIME: 331.74 MSEC
E/A RATIO: 0.78
E/E' RATIO: 21.43 M/S
ECHO LV POSTERIOR WALL: 1.2 CM (ref 0.6–1.1)
FRACTIONAL SHORTENING: 29 % (ref 28–44)
INTERVENTRICULAR SEPTUM: 1.22 CM (ref 0.6–1.1)
IVC DIAMETER: 1.02 CM
IVRT: 179.83 MSEC
LA MINOR: 4.7 CM
LA WIDTH: 4.7 CM
LEFT ATRIUM SIZE: 3.71 CM
LEFT INTERNAL DIMENSION IN SYSTOLE: 3.33 CM (ref 2.1–4)
LEFT VENTRICLE DIASTOLIC VOLUME: 101.68 ML
LEFT VENTRICLE SYSTOLIC VOLUME: 45.2 ML
LEFT VENTRICULAR INTERNAL DIMENSION IN DIASTOLE: 4.69 CM (ref 3.5–6)
LEFT VENTRICULAR MASS: 213.81 G
LV LATERAL E/E' RATIO: 18.75 M/S
LV SEPTAL E/E' RATIO: 25 M/S
LVOT MG: 2.43 MMHG
LVOT MV: 0.74 CM/S
MV MEAN GRADIENT: 2 MMHG
MV PEAK A VEL: 0.96 M/S
MV PEAK E VEL: 0.75 M/S
MV PEAK GRADIENT: 5 MMHG
MV STENOSIS PRESSURE HALF TIME: 96.21 MS
MV VALVE AREA BY CONTINUITY EQUATION: 2.65 CM2
MV VALVE AREA P 1/2 METHOD: 2.29 CM2
PISA AR MAX VEL: 4.01 M/S
PULM VEIN S/D RATIO: 1.89
PV PEAK D VEL: 0.36 M/S
PV PEAK GRADIENT: 3 MMHG
PV PEAK S VEL: 0.68 M/S
PV PEAK VELOCITY: 0.81 M/S
RA MAJOR: 3.88 CM
RA PRESSURE ESTIMATED: 3 MMHG
RA WIDTH: 2.99 CM
RIGHT VENTRICULAR END-DIASTOLIC DIMENSION: 2.85 CM
SINUS: 2.99 CM
STJ: 2.14 CM
TDI LATERAL: 0.04 M/S
TDI SEPTAL: 0.03 M/S
TDI: 0.04 M/S
TRICUSPID ANNULAR PLANE SYSTOLIC EXCURSION: 1.85 CM

## 2024-03-06 PROCEDURE — 93306 TTE W/DOPPLER COMPLETE: CPT | Mod: 26,,, | Performed by: INTERNAL MEDICINE

## 2024-03-06 PROCEDURE — 93306 TTE W/DOPPLER COMPLETE: CPT

## 2024-03-06 NOTE — TELEPHONE ENCOUNTER
No care due was identified.  [facility] Embedded Care Due Messages. Reference number: [id number].   3/06/2024 1:34:50 PM CST

## 2024-03-07 ENCOUNTER — PATIENT MESSAGE (OUTPATIENT)
Dept: FAMILY MEDICINE | Facility: CLINIC | Age: 69
End: 2024-03-07
Payer: MEDICARE

## 2024-03-07 DIAGNOSIS — I35.0 NONRHEUMATIC AORTIC VALVE STENOSIS: Primary | ICD-10-CM

## 2024-03-07 NOTE — TELEPHONE ENCOUNTER
Refill Routing Note   Medication(s) are not appropriate for processing by [facility] for the following reason(s):        Required vitals abnormal    [facility] action(s):  Defer               Appointments  past 12m or future 3m with PCP    Date Provider   Last Visit   2/28/2024 Warner Richey MD   Next Visit   6/27/2024 Warner Richey MD   ED visits in past 90 days: 0        Note composed:7:08 PM 03/06/2024

## 2024-03-07 NOTE — TELEPHONE ENCOUNTER
Echo with moderate AS referral to cards for recommendation on monitoring and need for possible valve replacement

## 2024-03-19 ENCOUNTER — OFFICE VISIT (OUTPATIENT)
Dept: CARDIOLOGY | Facility: CLINIC | Age: 69
End: 2024-03-19
Payer: MEDICARE

## 2024-03-19 VITALS
SYSTOLIC BLOOD PRESSURE: 134 MMHG | RESPIRATION RATE: 18 BRPM | BODY MASS INDEX: 36.02 KG/M2 | DIASTOLIC BLOOD PRESSURE: 72 MMHG | HEART RATE: 95 BPM | WEIGHT: 183.44 LBS | OXYGEN SATURATION: 96 % | HEIGHT: 60 IN

## 2024-03-19 DIAGNOSIS — R07.2 PRECORDIAL PAIN: ICD-10-CM

## 2024-03-19 DIAGNOSIS — Z72.0 TOBACCO ABUSE: ICD-10-CM

## 2024-03-19 DIAGNOSIS — I35.0 NONRHEUMATIC AORTIC VALVE STENOSIS: Primary | ICD-10-CM

## 2024-03-19 DIAGNOSIS — E78.2 MIXED HYPERLIPIDEMIA: ICD-10-CM

## 2024-03-19 DIAGNOSIS — E66.9 NON MORBID OBESITY, UNSPECIFIED OBESITY TYPE: ICD-10-CM

## 2024-03-19 DIAGNOSIS — R06.09 DOE (DYSPNEA ON EXERTION): ICD-10-CM

## 2024-03-19 DIAGNOSIS — I10 HYPERTENSION, UNSPECIFIED TYPE: ICD-10-CM

## 2024-03-19 DIAGNOSIS — Z78.9 PATIENT UNABLE TO EXERCISE: ICD-10-CM

## 2024-03-19 DIAGNOSIS — R94.31 ABNORMAL EKG: ICD-10-CM

## 2024-03-19 DIAGNOSIS — I10 ESSENTIAL HYPERTENSION: ICD-10-CM

## 2024-03-19 DIAGNOSIS — R09.89 LEFT CAROTID BRUIT: ICD-10-CM

## 2024-03-19 PROCEDURE — 99999 PR PBB SHADOW E&M-EST. PATIENT-LVL IV: CPT | Mod: PBBFAC,,, | Performed by: INTERNAL MEDICINE

## 2024-03-19 PROCEDURE — 93000 ELECTROCARDIOGRAM COMPLETE: CPT | Mod: S$GLB,,, | Performed by: INTERNAL MEDICINE

## 2024-03-19 PROCEDURE — 99205 OFFICE O/P NEW HI 60 MIN: CPT | Mod: S$GLB,,, | Performed by: INTERNAL MEDICINE

## 2024-03-19 RX ORDER — ATORVASTATIN CALCIUM 20 MG/1
20 TABLET, FILM COATED ORAL NIGHTLY
Qty: 90 TABLET | Refills: 3 | Status: SHIPPED | OUTPATIENT
Start: 2024-03-19

## 2024-03-19 NOTE — PROGRESS NOTES
CARDIOVASCULAR CONSULTATION    REASON FOR CONSULT:   Chanel Esparza is a 68 y.o. female who presents for AS.    PCP/req: Kaiden  HISTORY OF PRESENT ILLNESS:   The patient is a very pleasant 68-year-old woman referred by her primary care physician for evaluation of aortic stenosis.  She is accompanied by her son who has a paramedic.  She reports progressive dyspnea on exertion and possible anginal symptoms with exertion.  She tells me she is unable to climb a flight of stairs.  She denies any palpitations or syncope.  There has been no PND, orthopnea, or lower extremity edema.  She denies any melena, hematuria, or claudication symptoms.  Recent echocardiogram notes moderately severe aortic stenosis with preserved LV function.  She previously was on atorvastatin 40 mg daily but was intolerant of this.    Family history appears to be negative for premature CAD or sudden cardiac death amongst first-degree relatives.      The patient is a smoker.  She denies alcohol excess.    CARDIOVASCULAR HISTORY:   AS, mod-sev (echo 3/2024)    PAST MEDICAL HISTORY:     Past Medical History:   Diagnosis Date    Asthma     Emphysema of lung     Hypertension        PAST SURGICAL HISTORY:     Past Surgical History:   Procedure Laterality Date     SECTION      CHOLECYSTECTOMY      DIRECT LARYNGOSCOPY N/A 10/03/2023    Procedure: LARYNGOSCOPY, DIRECT;  Surgeon: Lyudmila Barrera MD;  Location: [facility] OR;  Service: ENT;  Laterality: N/A;    EXCISION OF PAROTID GLAND Right 10/03/2023    Procedure: EXCISION, PAROTID GLAND;  Surgeon: Lyudmila Barrera MD;  Location: [facility] OR;  Service: ENT;  Laterality: Right;  NEUROMONITORING CAITY ANGELO [phone]    RN PREOP 2023---CONSENTS INCOMPLETE    EYE SURGERY      Cataract Surgery    HYSTERECTOMY      CAPRICE    TONSILLECTOMY      TUBAL LIGATION  1973       ALLERGIES AND MEDICATION:   Review of patient's allergies indicates:  No Known Allergies     Medication List            Accurate  as of 2024  2:59 PM. If you have any questions, ask your nurse or doctor.                CONTINUE taking these medications      albuterol 90 mcg/actuation inhaler  Commonly known as: PROVENTIL/VENTOLIN HFA  INHALE 2 PUFF INTO LUNGS EVERY 6 HOURS AS NEEDED FOR WHEEZING     albuterol-ipratropium 2.5 mg-0.5 mg/3 mL nebulizer solution  Commonly known as: DUO-NEB  Take 3 mLs by nebulization every 6 (six) hours as needed for Wheezing or Shortness of Breath. Rescue     aspirin 81 MG EC tablet  Commonly known as: ECOTRIN     fluticasone-umeclidin-vilanter 100-62.5-25 mcg Dsdv  Commonly known as: TRELEGY ELLIPTA  Inhale 1 puff into the lungs once daily.     lisinopriL-hydrochlorothiazide 20-25 mg Tab  Commonly known as: PRINZIDE,ZESTORETIC  TAKE 1 TABLET BY MOUTH EVERY DAY     nystatin powder  Commonly known as: MYCOSTATIN  Apply topically once daily.     ZYRTEC ORAL            STOP taking these medications      clotrimazole 1 % cream  Commonly known as: LOTRIMIN  Stopped by: Sherman Alvarado MD     ibuprofen 800 MG tablet  Commonly known as: ADVIL,MOTRIN  Stopped by: Sherman Alvarado MD              SOCIAL HISTORY:     Social History     Socioeconomic History    Marital status:    Tobacco Use    Smoking status: Some Days     Current packs/day: 0.00     Average packs/day: 1 pack/day for 55.0 years (55.0 ttl pk-yrs)     Types: Cigarettes     Start date: 1968     Last attempt to quit: 3/5/2018     Years since quittin.0    Smokeless tobacco: Never   Substance and Sexual Activity    Alcohol use: No    Drug use: No    Sexual activity: Not Currently     Social Determinants of Health     Financial Resource Strain: High Risk (2024)    Overall Financial Resource Strain (CARDIA)     Difficulty of Paying Living Expenses: Very hard   Food Insecurity: Food Insecurity Present (2024)    Hunger Vital Sign     Worried About Running Out of Food in the Last Year: Often true     Ran Out of Food in the Last  Year: Often true   Transportation Needs: No Transportation Needs (2/14/2024)    PRAPARE - Transportation     Lack of Transportation (Medical): No     Lack of Transportation (Non-Medical): No   Physical Activity: Inactive (2/14/2024)    Exercise Vital Sign     Days of Exercise per Week: 0 days     Minutes of Exercise per Session: 0 min   Stress: No Stress Concern Present (2/14/2024)    [employer] [place] [employer] - Occupational Stress Questionnaire     Feeling of Stress : Only a little   Social Connections: Unknown (2/14/2024)    Social Connection and Isolation Panel [NHANES]     Frequency of Communication with Friends and Family: More than three times a week     Frequency of Social Gatherings with Friends and Family: Twice a week     Active Member of Clubs or Organizations: Yes     Attends Club or Organization Meetings: More than 4 times per year     Marital Status:    Housing Stability: Low Risk  (2/14/2024)    Housing Stability Vital Sign     Unable to Pay for Housing in the Last Year: No     Number of Places Lived in the Last Year: 1     Unstable Housing in the Last Year: No       FAMILY HISTORY:     Family History   Problem Relation Age of Onset    Hypertension Mother     Diabetes Sister     Diabetes Brother        REVIEW OF SYSTEMS:   Review of Systems   Constitutional:  Negative for chills, diaphoresis and fever.   HENT:  Negative for nosebleeds.    Eyes:  Negative for blurred vision, double vision and photophobia.   Respiratory:  Positive for shortness of breath. Negative for hemoptysis and wheezing.    Cardiovascular:  Positive for chest pain. Negative for palpitations, orthopnea, claudication, leg swelling and PND.   Gastrointestinal:  Negative for abdominal pain, blood in stool, heartburn, melena, nausea and vomiting.   Genitourinary:  Negative for flank pain and hematuria.   Musculoskeletal:  Negative for falls, myalgias and neck pain.   Skin:  Negative for rash.   Neurological:   Negative for dizziness, seizures, loss of consciousness, weakness and headaches.   Endo/Heme/Allergies:  Negative for polydipsia. Does not bruise/bleed easily.   Psychiatric/Behavioral:  Negative for depression and memory loss. The patient is not nervous/anxious.        PHYSICAL EXAM:     Vitals:    03/19/24 1444   BP: 134/72   Pulse: 95   Resp: 18    Body mass index is 35.82 kg/m².  Weight: 83.2 kg (183 lb 6.8 oz)   Height: 5' (152.4 cm)     Physical Exam  Vitals reviewed.   Constitutional:       General: She is not in acute distress.     Appearance: She is well-developed. She is obese. She is not ill-appearing, toxic-appearing or diaphoretic.   HENT:      Head: Normocephalic and atraumatic.   Eyes:      General: No scleral icterus.     Extraocular Movements: Extraocular movements intact.      Conjunctiva/sclera: Conjunctivae normal.      Pupils: Pupils are equal, round, and reactive to light.   Neck:      Thyroid: No thyromegaly.      Vascular: Normal carotid pulses. Carotid bruit present. No JVD.      Trachea: Trachea normal.   Cardiovascular:      Rate and Rhythm: Normal rate and regular rhythm.      Pulses:           Carotid pulses are 2+ on the right side and 2+ on the left side with bruit.     Heart sounds: S1 normal and S2 normal. Heart sounds are distant. Murmur heard.      Systolic murmur is present with a grade of 2/6.      No friction rub. No gallop.   Pulmonary:      Effort: Pulmonary effort is normal. No respiratory distress.      Breath sounds: Normal breath sounds. No stridor. No wheezing, rhonchi or rales.   Chest:      Chest wall: No tenderness.   Abdominal:      General: There is no distension.      Palpations: Abdomen is soft.   Musculoskeletal:         General: No swelling or tenderness. Normal range of motion.      Cervical back: Normal range of motion and neck supple. No edema or rigidity.      Right lower leg: No edema.      Left lower leg: No edema.   Feet:      Right foot:      Skin  integrity: No ulcer.      Left foot:      Skin integrity: No ulcer.   Skin:     General: Skin is dry.      Coloration: Skin is not jaundiced.      Comments: Feet cool/purple discoloration   Neurological:      General: No focal deficit present.      Mental Status: She is alert and oriented to person, place, and time.      Cranial Nerves: No cranial nerve deficit.   Psychiatric:         Mood and Affect: Mood normal.         Speech: Speech normal.         Behavior: Behavior normal. Behavior is cooperative.         DATA:   EKG: (personally reviewed tracing(s))  3/19/24 SR 91, RBBB, diff ST depression ?isch, more prom than 9/26/23    Laboratory:  CBC:  Recent Labs   Lab 08/30/23  0828 09/26/23  1205 02/28/24  1439   WBC 7.22 10.63 10.66   Hemoglobin 15.1 14.5 15.3   Hematocrit 47.3 43.6 47.2   Platelets 191 212 216       CHEMISTRIES:  Recent Labs   Lab 07/21/21  1410 08/24/21  0833 12/30/21  0915 04/28/22  0811 09/06/22  0754 01/10/23  0753 05/15/23  0754 08/30/23  0828 09/26/23  1205 02/28/24  1439   Glucose 102 103 103 103   < > 105 105 98 90 98   Sodium 141 138 135 L 139   < > 139 140 141 141 140   Potassium 3.5 3.7 4.1 3.8   < > 4.5 4.5 4.1 4.1 4.1   BUN 16 11 14 16   < > 20 14 17 14 35 H   Creatinine 1.1 0.8 0.9 1.0   < > 0.9 0.9 0.9 0.8 1.4   eGFR if non African American 52 A >60.0 >60.0 58.0 A  --   --   --   --   --   --    eGFR  --   --   --   --    < > >60.0 >60.0 >60.0 >60 40 A   Calcium 10.2 9.7 9.5 9.6   < > 10.4 9.8 9.4 9.8 9.7    < > = values in this interval not displayed.       CARDIAC BIOMARKERS:        COAGS:        LIPIDS/LFTS:  Recent Labs   Lab 04/28/22  0811 09/06/22  0754 05/15/23  0754 09/26/23  1205 02/28/24  1439   Cholesterol 218 H 136  --   --   --    Triglycerides 114 66  --   --   --    HDL 54 57  --   --   --    LDL Cholesterol 141.2 65.8  --   --   --    Non-HDL Cholesterol 164 79  --   --   --    AST 20 35 19 22 28   ALT 20 33 19 34 27       Cardiovascular Testing:  Echo 3/6/24    Left  Ventricle: The left ventricle is normal in size. Mildly increased wall thickness. There is normal systolic function with a visually estimated ejection fraction of 55 - 60%. Grade I diastolic dysfunction.    Right Ventricle: Normal right ventricular cavity size. Systolic function is normal.    Left Atrium: Left atrium is mildly dilated.    Right Atrium: Right atrium is mildly dilated.    Aortic Valve: There is moderate to severe stenosis. Aortic valve area by VTI is 0.98 cm². Aortic valve peak velocity is 3.14 m/s. Mean gradient is 24 mmHg. The dimensionless index is 0.30. There is mild aortic regurgitation.    Mitral Valve: There is mild to moderate regurgitation.    Tricuspid Valve: There is mild regurgitation.    IVC/SVC: Normal venous pressure at 3 mmHg.      ASSESSMENT:   # WATTS, ?anginal sxs  # AS, mod-sev (echo 3/2024)  # HTN, controlled  # HLP, intol of atorva 40mg  # L car bruit vs transmitted murmur  # abnl EKG  # tob abuse, ongoing  # BMI 36    PLAN:   Cont med rx  Cont ASA 81mg qd  Restart atorva 20mg qhs  Quit smoking  Lakshmi MPi (pt unable to exercise)  Carotid US  RTC 1 month (Apr 2024).  ?needs cath.  Consider pulm eval if above is unremarkable    Sherman Alvarado MD, FACC

## 2024-03-20 ENCOUNTER — TELEPHONE (OUTPATIENT)
Dept: CARDIOLOGY | Facility: CLINIC | Age: 69
End: 2024-03-20
Payer: MEDICARE

## 2024-03-20 ENCOUNTER — PATIENT OUTREACH (OUTPATIENT)
Dept: ADMINISTRATIVE | Facility: HOSPITAL | Age: 69
End: 2024-03-20
Payer: MEDICARE

## 2024-03-20 LAB
OHS QRS DURATION: 122 MS
OHS QTC CALCULATION: 467 MS

## 2024-03-20 NOTE — TELEPHONE ENCOUNTER
Called patient and informed her of the appointment for her kayla stress test and her ultrasound and follow up appointment with Dr. Alvarado. Patient verbalized understanding.

## 2024-03-20 NOTE — TELEPHONE ENCOUNTER
----- Message from Arnulfo Tovar sent at 3/20/2024  9:47 AM CDT -----  Type: Patient Call Back         Who called: [facility] [[id number]]         What is the request in detail: Pt returned call about changing appt date.         Can the clinic reply by [facility]? No         Would the patient rather a call back or a response via [facility]? Call back         Best call back number: Telephone Information:  mobile [phone]             Additional Information:         Thank you.

## 2024-04-12 ENCOUNTER — PATIENT MESSAGE (OUTPATIENT)
Dept: OTOLARYNGOLOGY | Facility: CLINIC | Age: 69
End: 2024-04-12
Payer: MEDICARE

## 2024-04-12 ENCOUNTER — E-VISIT (OUTPATIENT)
Dept: FAMILY MEDICINE | Facility: CLINIC | Age: 69
End: 2024-04-12
Payer: MEDICARE

## 2024-04-12 ENCOUNTER — HOSPITAL ENCOUNTER (OUTPATIENT)
Dept: CARDIOLOGY | Facility: HOSPITAL | Age: 69
Discharge: HOME | DRG: 189 | End: 2024-04-12
Attending: INTERNAL MEDICINE
Payer: MEDICARE

## 2024-04-12 ENCOUNTER — PATIENT MESSAGE (OUTPATIENT)
Dept: FAMILY MEDICINE | Facility: CLINIC | Age: 69
End: 2024-04-12

## 2024-04-12 ENCOUNTER — HOSPITAL ENCOUNTER (OUTPATIENT)
Dept: RADIOLOGY | Facility: HOSPITAL | Age: 69
Discharge: HOME | DRG: 189 | End: 2024-04-12
Attending: INTERNAL MEDICINE
Payer: MEDICARE

## 2024-04-12 DIAGNOSIS — R06.09 DOE (DYSPNEA ON EXERTION): ICD-10-CM

## 2024-04-12 DIAGNOSIS — R09.89 LEFT CAROTID BRUIT: ICD-10-CM

## 2024-04-12 DIAGNOSIS — R94.31 ABNORMAL EKG: ICD-10-CM

## 2024-04-12 DIAGNOSIS — Z78.9 PATIENT UNABLE TO EXERCISE: ICD-10-CM

## 2024-04-12 DIAGNOSIS — R07.2 PRECORDIAL PAIN: ICD-10-CM

## 2024-04-12 DIAGNOSIS — J44.1 COPD WITH ACUTE EXACERBATION: Primary | ICD-10-CM

## 2024-04-12 LAB
CV STRESS BASE HR: 96 BPM
DIASTOLIC BLOOD PRESSURE: 79 MMHG
LEFT CBA DIAS: 10 CM/S
LEFT CBA SYS: 63 CM/S
LEFT CCA DIST DIAS: 16 CM/S
LEFT CCA DIST SYS: 90 CM/S
LEFT CCA MID DIAS: 17 CM/S
LEFT CCA MID SYS: 91 CM/S
LEFT CCA PROX DIAS: 17 CM/S
LEFT CCA PROX SYS: 97 CM/S
LEFT ECA DIAS: 0 CM/S
LEFT ECA SYS: 97 CM/S
LEFT ICA DIST DIAS: 15 CM/S
LEFT ICA DIST SYS: 71 CM/S
LEFT ICA MID DIAS: 35 CM/S
LEFT ICA MID SYS: 108 CM/S
LEFT ICA PROX DIAS: 23 CM/S
LEFT ICA PROX SYS: 91 CM/S
LEFT VERTEBRAL DIAS: 13 CM/S
LEFT VERTEBRAL SYS: 33 CM/S
NUC STRESS EJECTION FRACTION: 61 %
OHS CV CAROTID RIGHT ICA EDV HIGHEST: 26
OHS CV CAROTID ULTRASOUND LEFT ICA/CCA RATIO: 1.2
OHS CV CAROTID ULTRASOUND RIGHT ICA/CCA RATIO: 1.28
OHS CV CPX 85 PERCENT MAX PREDICTED HEART RATE MALE: 128
OHS CV CPX MAX PREDICTED HEART RATE: 150
OHS CV CPX PATIENT IS FEMALE: 1
OHS CV CPX PATIENT IS MALE: 0
OHS CV CPX PEAK DIASTOLIC BLOOD PRESSURE: 73 MMHG
OHS CV CPX PEAK HEAR RATE: 111 BPM
OHS CV CPX PEAK RATE PRESSURE PRODUCT: NORMAL
OHS CV CPX PEAK SYSTOLIC BLOOD PRESSURE: 105 MMHG
OHS CV CPX PERCENT MAX PREDICTED HEART RATE ACHIEVED: 77
OHS CV CPX RATE PRESSURE PRODUCT PRESENTING: NORMAL
OHS CV PV CAROTID LEFT HIGHEST CCA: 97
OHS CV PV CAROTID LEFT HIGHEST ICA: 108
OHS CV PV CAROTID RIGHT HIGHEST CCA: 94
OHS CV PV CAROTID RIGHT HIGHEST ICA: 119
OHS CV US CAROTID LEFT HIGHEST EDV: 35
RIGHT CBA DIAS: 22 CM/S
RIGHT CBA SYS: 99 CM/S
RIGHT CCA DIST DIAS: 14 CM/S
RIGHT CCA DIST SYS: 93 CM/S
RIGHT CCA MID DIAS: 20 CM/S
RIGHT CCA MID SYS: 90 CM/S
RIGHT CCA PROX DIAS: 14 CM/S
RIGHT CCA PROX SYS: 94 CM/S
RIGHT ECA DIAS: 1 CM/S
RIGHT ECA SYS: 98 CM/S
RIGHT ICA DIST DIAS: 17 CM/S
RIGHT ICA DIST SYS: 67 CM/S
RIGHT ICA MID DIAS: 26 CM/S
RIGHT ICA MID SYS: 119 CM/S
RIGHT ICA PROX DIAS: 17 CM/S
RIGHT ICA PROX SYS: 89 CM/S
RIGHT VERTEBRAL DIAS: 14 CM/S
RIGHT VERTEBRAL SYS: 71 CM/S
SYSTOLIC BLOOD PRESSURE: 112 MMHG

## 2024-04-12 PROCEDURE — 93880 EXTRACRANIAL BILAT STUDY: CPT

## 2024-04-12 PROCEDURE — A9502 TC99M TETROFOSMIN: HCPCS | Performed by: INTERNAL MEDICINE

## 2024-04-12 PROCEDURE — 93017 CV STRESS TEST TRACING ONLY: CPT

## 2024-04-12 PROCEDURE — 78452 HT MUSCLE IMAGE SPECT MULT: CPT

## 2024-04-12 PROCEDURE — 93016 CV STRESS TEST SUPVJ ONLY: CPT | Mod: ,,, | Performed by: INTERNAL MEDICINE

## 2024-04-12 PROCEDURE — 93880 EXTRACRANIAL BILAT STUDY: CPT | Mod: 26,,, | Performed by: INTERNAL MEDICINE

## 2024-04-12 PROCEDURE — 93018 CV STRESS TEST I&R ONLY: CPT | Mod: ,,, | Performed by: INTERNAL MEDICINE

## 2024-04-12 PROCEDURE — 63600175 PHARM REV CODE 636 W HCPCS: Performed by: INTERNAL MEDICINE

## 2024-04-12 PROCEDURE — 99421 OL DIG E/M SVC 5-10 MIN: CPT | Mod: ,,, | Performed by: INTERNAL MEDICINE

## 2024-04-12 PROCEDURE — 78452 HT MUSCLE IMAGE SPECT MULT: CPT | Mod: 26,,, | Performed by: INTERNAL MEDICINE

## 2024-04-12 RX ORDER — DOXYCYCLINE HYCLATE 100 MG
100 TABLET ORAL 2 TIMES DAILY
Qty: 14 TABLET | Refills: 0 | Status: ON HOLD | OUTPATIENT
Start: 2024-04-12 | End: 2024-04-19

## 2024-04-12 RX ORDER — PREDNISONE 20 MG/1
40 TABLET ORAL DAILY
Qty: 10 TABLET | Refills: 0 | Status: ON HOLD | OUTPATIENT
Start: 2024-04-12 | End: 2024-04-19 | Stop reason: HOSPADM

## 2024-04-12 RX ORDER — REGADENOSON 0.08 MG/ML
0.4 INJECTION, SOLUTION INTRAVENOUS ONCE
Status: COMPLETED | OUTPATIENT
Start: 2024-04-12 | End: 2024-04-12

## 2024-04-12 RX ADMIN — TETROFOSMIN 10.2 MILLICURIE: 1.38 INJECTION, POWDER, LYOPHILIZED, FOR SOLUTION INTRAVENOUS at 07:04

## 2024-04-12 RX ADMIN — TETROFOSMIN 30.6 MILLICURIE: 1.38 INJECTION, POWDER, LYOPHILIZED, FOR SOLUTION INTRAVENOUS at 09:04

## 2024-04-12 RX ADMIN — REGADENOSON 0.4 MG: 0.08 INJECTION, SOLUTION INTRAVENOUS at 09:04

## 2024-04-12 NOTE — PROGRESS NOTES
Patient with COPD (current smoker) with increasing cough and sputum production x one week short prednisone (5 days 40mg) and coverage with tetracyline (doxycyline 100mg BID) for acute COPD exacerbation

## 2024-04-15 ENCOUNTER — PATIENT OUTREACH (OUTPATIENT)
Dept: ADMINISTRATIVE | Facility: OTHER | Age: 69
End: 2024-04-15
Payer: MEDICARE

## 2024-04-15 ENCOUNTER — HOSPITAL ENCOUNTER (INPATIENT)
Facility: HOSPITAL | Age: 69
LOS: 4 days | Discharge: HOME HEALTH | DRG: 189 | End: 2024-04-19
Attending: EMERGENCY MEDICINE | Admitting: STUDENT IN AN ORGANIZED HEALTH CARE EDUCATION/TRAINING PROGRAM
Payer: MEDICARE

## 2024-04-15 DIAGNOSIS — J44.9 CHRONIC OBSTRUCTIVE PULMONARY DISEASE, UNSPECIFIED COPD TYPE: ICD-10-CM

## 2024-04-15 DIAGNOSIS — J44.1 COPD EXACERBATION: ICD-10-CM

## 2024-04-15 DIAGNOSIS — I10 PRIMARY HYPERTENSION: ICD-10-CM

## 2024-04-15 DIAGNOSIS — I35.0 SEVERE AORTIC STENOSIS: ICD-10-CM

## 2024-04-15 DIAGNOSIS — J96.02 ACUTE RESPIRATORY FAILURE WITH HYPOXIA AND HYPERCAPNIA: ICD-10-CM

## 2024-04-15 DIAGNOSIS — J43.9 PULMONARY EMPHYSEMA, UNSPECIFIED EMPHYSEMA TYPE: ICD-10-CM

## 2024-04-15 DIAGNOSIS — J44.1 COPD WITH ACUTE EXACERBATION: Primary | ICD-10-CM

## 2024-04-15 DIAGNOSIS — R53.81 DEBILITY: ICD-10-CM

## 2024-04-15 DIAGNOSIS — J96.01 ACUTE RESPIRATORY FAILURE WITH HYPOXIA AND HYPERCAPNIA: ICD-10-CM

## 2024-04-15 DIAGNOSIS — E87.6 HYPOKALEMIA: ICD-10-CM

## 2024-04-15 DIAGNOSIS — D72.829 LEUKOCYTOSIS, UNSPECIFIED TYPE: ICD-10-CM

## 2024-04-15 DIAGNOSIS — E66.01 MORBID OBESITY: ICD-10-CM

## 2024-04-15 DIAGNOSIS — R06.00 DYSPNEA: ICD-10-CM

## 2024-04-15 DIAGNOSIS — R06.02 SHORTNESS OF BREATH: ICD-10-CM

## 2024-04-15 DIAGNOSIS — R06.03 ACUTE RESPIRATORY DISTRESS: ICD-10-CM

## 2024-04-15 DIAGNOSIS — R06.2 WHEEZING: ICD-10-CM

## 2024-04-15 PROBLEM — Z71.89 ADVANCED CARE PLANNING/COUNSELING DISCUSSION: Status: ACTIVE | Noted: 2024-04-15

## 2024-04-15 PROBLEM — R79.89 ELEVATED BRAIN NATRIURETIC PEPTIDE (BNP) LEVEL: Status: ACTIVE | Noted: 2024-04-15

## 2024-04-15 LAB
ALBUMIN SERPL BCP-MCNC: 3.7 G/DL (ref 3.5–5.2)
ALLENS TEST: ABNORMAL
ALP SERPL-CCNC: 59 U/L (ref 55–135)
ALT SERPL W/O P-5'-P-CCNC: 27 U/L (ref 10–44)
ANION GAP SERPL CALC-SCNC: 10 MMOL/L (ref 8–16)
AST SERPL-CCNC: 26 U/L (ref 10–40)
BASOPHILS # BLD AUTO: 0.01 K/UL (ref 0–0.2)
BASOPHILS NFR BLD: 0.1 % (ref 0–1.9)
BILIRUB SERPL-MCNC: 0.7 MG/DL (ref 0.1–1)
BNP SERPL-MCNC: 525 PG/ML (ref 0–99)
BUN SERPL-MCNC: 32 MG/DL (ref 8–23)
CALCIUM SERPL-MCNC: 10.1 MG/DL (ref 8.7–10.5)
CHLORIDE SERPL-SCNC: 102 MMOL/L (ref 95–110)
CO2 SERPL-SCNC: 26 MMOL/L (ref 23–29)
CREAT SERPL-MCNC: 1.1 MG/DL (ref 0.5–1.4)
CTP QC/QA: YES
CTP QC/QA: YES
DELSYS: ABNORMAL
DIFFERENTIAL METHOD BLD: ABNORMAL
EOSINOPHIL # BLD AUTO: 0 K/UL (ref 0–0.5)
EOSINOPHIL NFR BLD: 0.1 % (ref 0–8)
EP: 5
ERYTHROCYTE [DISTWIDTH] IN BLOOD BY AUTOMATED COUNT: 14.1 % (ref 11.5–14.5)
ERYTHROCYTE [SEDIMENTATION RATE] IN BLOOD BY WESTERGREN METHOD: 18 MM/H
EST. GFR  (NO RACE VARIABLE): 54 ML/MIN/1.73 M^2
FIO2: 30
GLUCOSE SERPL-MCNC: 141 MG/DL (ref 70–110)
HCO3 UR-SCNC: 27.2 MMOL/L (ref 24–28)
HCO3 UR-SCNC: 29.5 MMOL/L (ref 24–28)
HCO3 UR-SCNC: 30 MMOL/L (ref 24–28)
HCT VFR BLD AUTO: 50.4 % (ref 37–48.5)
HGB BLD-MCNC: 16.3 G/DL (ref 12–16)
IMM GRANULOCYTES # BLD AUTO: 0.05 K/UL (ref 0–0.04)
IMM GRANULOCYTES NFR BLD AUTO: 0.6 % (ref 0–0.5)
IP: 10
LACTATE SERPL-SCNC: 2.6 MMOL/L (ref 0.5–2.2)
LYMPHOCYTES # BLD AUTO: 0.7 K/UL (ref 1–4.8)
LYMPHOCYTES NFR BLD: 7.7 % (ref 18–48)
MAGNESIUM SERPL-MCNC: 1.8 MG/DL (ref 1.6–2.6)
MCH RBC QN AUTO: 28.1 PG (ref 27–31)
MCHC RBC AUTO-ENTMCNC: 32.3 G/DL (ref 32–36)
MCV RBC AUTO: 87 FL (ref 82–98)
MODE: ABNORMAL
MONOCYTES # BLD AUTO: 0.2 K/UL (ref 0.3–1)
MONOCYTES NFR BLD: 2.4 % (ref 4–15)
NEUTROPHILS # BLD AUTO: 8.1 K/UL (ref 1.8–7.7)
NEUTROPHILS NFR BLD: 89.1 % (ref 38–73)
NRBC BLD-RTO: 0 /100 WBC
OHS QRS DURATION: 120 MS
OHS QTC CALCULATION: 495 MS
PCO2 BLDA: 52.1 MMHG (ref 35–45)
PCO2 BLDA: 53.2 MMHG (ref 35–45)
PCO2 BLDA: 61.6 MMHG (ref 35–45)
PH SMN: 7.29 [PH] (ref 7.35–7.45)
PH SMN: 7.33 [PH] (ref 7.35–7.45)
PH SMN: 7.35 [PH] (ref 7.35–7.45)
PLATELET # BLD AUTO: 178 K/UL (ref 150–450)
PMV BLD AUTO: 12 FL (ref 9.2–12.9)
PO2 BLDA: 28 MMHG (ref 40–60)
PO2 BLDA: 41 MMHG (ref 40–60)
PO2 BLDA: 53 MMHG (ref 40–60)
POC BE: 0 MMOL/L
POC BE: 1 MMOL/L
POC BE: 3 MMOL/L
POC MOLECULAR INFLUENZA A AGN: NEGATIVE
POC MOLECULAR INFLUENZA B AGN: NEGATIVE
POC SATURATED O2: 43 % (ref 95–100)
POC SATURATED O2: 72 % (ref 95–100)
POC SATURATED O2: 84 % (ref 95–100)
POC TCO2: 29 MMOL/L (ref 24–29)
POC TCO2: 31 MMOL/L (ref 24–29)
POC TCO2: 32 MMOL/L (ref 24–29)
POTASSIUM SERPL-SCNC: 4.2 MMOL/L (ref 3.5–5.1)
PROT SERPL-MCNC: 7.9 G/DL (ref 6–8.4)
RBC # BLD AUTO: 5.81 M/UL (ref 4–5.4)
SAMPLE: ABNORMAL
SARS-COV-2 RDRP RESP QL NAA+PROBE: NEGATIVE
SITE: ABNORMAL
SODIUM SERPL-SCNC: 138 MMOL/L (ref 136–145)
SPONT RATE: 36
TROPONIN I SERPL DL<=0.01 NG/ML-MCNC: 0.01 NG/ML (ref 0–0.03)
WBC # BLD AUTO: 9.09 K/UL (ref 3.9–12.7)

## 2024-04-15 PROCEDURE — 94644 CONT INHLJ TX 1ST HOUR: CPT

## 2024-04-15 PROCEDURE — 83735 ASSAY OF MAGNESIUM: CPT | Performed by: EMERGENCY MEDICINE

## 2024-04-15 PROCEDURE — 63600175 PHARM REV CODE 636 W HCPCS: Performed by: EMERGENCY MEDICINE

## 2024-04-15 PROCEDURE — 99291 CRITICAL CARE FIRST HOUR: CPT | Mod: ,,, | Performed by: INTERNAL MEDICINE

## 2024-04-15 PROCEDURE — 94660 CPAP INITIATION&MGMT: CPT

## 2024-04-15 PROCEDURE — 25000242 PHARM REV CODE 250 ALT 637 W/ HCPCS: Performed by: STUDENT IN AN ORGANIZED HEALTH CARE EDUCATION/TRAINING PROGRAM

## 2024-04-15 PROCEDURE — 99900035 HC TECH TIME PER 15 MIN (STAT)

## 2024-04-15 PROCEDURE — 25000242 PHARM REV CODE 250 ALT 637 W/ HCPCS: Performed by: EMERGENCY MEDICINE

## 2024-04-15 PROCEDURE — 99285 EMERGENCY DEPT VISIT HI MDM: CPT | Mod: 25

## 2024-04-15 PROCEDURE — 93010 ELECTROCARDIOGRAM REPORT: CPT | Mod: ,,, | Performed by: INTERNAL MEDICINE

## 2024-04-15 PROCEDURE — 20000000 HC ICU ROOM

## 2024-04-15 PROCEDURE — 94640 AIRWAY INHALATION TREATMENT: CPT

## 2024-04-15 PROCEDURE — 80053 COMPREHEN METABOLIC PANEL: CPT | Performed by: EMERGENCY MEDICINE

## 2024-04-15 PROCEDURE — 94645 CONT INHLJ TX EACH ADDL HOUR: CPT

## 2024-04-15 PROCEDURE — 5A09457 ASSISTANCE WITH RESPIRATORY VENTILATION, 24-96 CONSECUTIVE HOURS, CONTINUOUS POSITIVE AIRWAY PRESSURE: ICD-10-PCS | Performed by: STUDENT IN AN ORGANIZED HEALTH CARE EDUCATION/TRAINING PROGRAM

## 2024-04-15 PROCEDURE — 87641 MR-STAPH DNA AMP PROBE: CPT | Performed by: INTERNAL MEDICINE

## 2024-04-15 PROCEDURE — 83880 ASSAY OF NATRIURETIC PEPTIDE: CPT | Performed by: EMERGENCY MEDICINE

## 2024-04-15 PROCEDURE — 94761 N-INVAS EAR/PLS OXIMETRY MLT: CPT | Mod: XB

## 2024-04-15 PROCEDURE — 83605 ASSAY OF LACTIC ACID: CPT | Performed by: EMERGENCY MEDICINE

## 2024-04-15 PROCEDURE — 27000190 HC CPAP FULL FACE MASK W/VALVE

## 2024-04-15 PROCEDURE — 87040 BLOOD CULTURE FOR BACTERIA: CPT | Performed by: EMERGENCY MEDICINE

## 2024-04-15 PROCEDURE — 82803 BLOOD GASES ANY COMBINATION: CPT

## 2024-04-15 PROCEDURE — 84484 ASSAY OF TROPONIN QUANT: CPT | Performed by: EMERGENCY MEDICINE

## 2024-04-15 PROCEDURE — 96375 TX/PRO/DX INJ NEW DRUG ADDON: CPT

## 2024-04-15 PROCEDURE — 87635 SARS-COV-2 COVID-19 AMP PRB: CPT | Performed by: STUDENT IN AN ORGANIZED HEALTH CARE EDUCATION/TRAINING PROGRAM

## 2024-04-15 PROCEDURE — 25000003 PHARM REV CODE 250: Performed by: STUDENT IN AN ORGANIZED HEALTH CARE EDUCATION/TRAINING PROGRAM

## 2024-04-15 PROCEDURE — 27000221 HC OXYGEN, UP TO 24 HOURS

## 2024-04-15 PROCEDURE — 25000003 PHARM REV CODE 250: Performed by: EMERGENCY MEDICINE

## 2024-04-15 PROCEDURE — 85025 COMPLETE CBC W/AUTO DIFF WBC: CPT | Performed by: EMERGENCY MEDICINE

## 2024-04-15 PROCEDURE — 93005 ELECTROCARDIOGRAM TRACING: CPT

## 2024-04-15 PROCEDURE — 63600175 PHARM REV CODE 636 W HCPCS: Performed by: STUDENT IN AN ORGANIZED HEALTH CARE EDUCATION/TRAINING PROGRAM

## 2024-04-15 PROCEDURE — 27100171 HC OXYGEN HIGH FLOW UP TO 24 HOURS

## 2024-04-15 PROCEDURE — 96365 THER/PROPH/DIAG IV INF INIT: CPT

## 2024-04-15 PROCEDURE — 5A0935A ASSISTANCE WITH RESPIRATORY VENTILATION, LESS THAN 24 CONSECUTIVE HOURS, HIGH NASAL FLOW/VELOCITY: ICD-10-PCS | Performed by: STUDENT IN AN ORGANIZED HEALTH CARE EDUCATION/TRAINING PROGRAM

## 2024-04-15 RX ORDER — MAGNESIUM SULFATE 1 G/100ML
1 INJECTION INTRAVENOUS
Status: COMPLETED | OUTPATIENT
Start: 2024-04-15 | End: 2024-04-15

## 2024-04-15 RX ORDER — SODIUM CHLORIDE 0.9 % (FLUSH) 0.9 %
3 SYRINGE (ML) INJECTION
Status: DISCONTINUED | OUTPATIENT
Start: 2024-04-15 | End: 2024-04-19 | Stop reason: HOSPADM

## 2024-04-15 RX ORDER — IPRATROPIUM BROMIDE AND ALBUTEROL SULFATE 2.5; .5 MG/3ML; MG/3ML
3 SOLUTION RESPIRATORY (INHALATION)
Status: COMPLETED | OUTPATIENT
Start: 2024-04-15 | End: 2024-04-15

## 2024-04-15 RX ORDER — ASPIRIN 81 MG/1
81 TABLET ORAL DAILY
Status: DISCONTINUED | OUTPATIENT
Start: 2024-04-16 | End: 2024-04-19 | Stop reason: HOSPADM

## 2024-04-15 RX ORDER — ENOXAPARIN SODIUM 100 MG/ML
40 INJECTION SUBCUTANEOUS EVERY 24 HOURS
Status: DISCONTINUED | OUTPATIENT
Start: 2024-04-15 | End: 2024-04-19 | Stop reason: HOSPADM

## 2024-04-15 RX ORDER — ONDANSETRON HYDROCHLORIDE 2 MG/ML
4 INJECTION, SOLUTION INTRAVENOUS EVERY 12 HOURS PRN
Status: DISCONTINUED | OUTPATIENT
Start: 2024-04-15 | End: 2024-04-19 | Stop reason: HOSPADM

## 2024-04-15 RX ORDER — MICONAZOLE NITRATE 2 %
POWDER (GRAM) TOPICAL 2 TIMES DAILY
Status: DISCONTINUED | OUTPATIENT
Start: 2024-04-15 | End: 2024-04-19 | Stop reason: HOSPADM

## 2024-04-15 RX ORDER — ACETAMINOPHEN 325 MG/1
650 TABLET ORAL EVERY 8 HOURS PRN
Status: DISCONTINUED | OUTPATIENT
Start: 2024-04-15 | End: 2024-04-17

## 2024-04-15 RX ORDER — ATORVASTATIN CALCIUM 10 MG/1
20 TABLET, FILM COATED ORAL NIGHTLY
Status: DISCONTINUED | OUTPATIENT
Start: 2024-04-15 | End: 2024-04-19 | Stop reason: HOSPADM

## 2024-04-15 RX ORDER — METHYLPREDNISOLONE SOD SUCC 125 MG
125 VIAL (EA) INJECTION
Status: COMPLETED | OUTPATIENT
Start: 2024-04-15 | End: 2024-04-15

## 2024-04-15 RX ORDER — IPRATROPIUM BROMIDE AND ALBUTEROL SULFATE 2.5; .5 MG/3ML; MG/3ML
3 SOLUTION RESPIRATORY (INHALATION)
Status: DISCONTINUED | OUTPATIENT
Start: 2024-04-15 | End: 2024-04-17

## 2024-04-15 RX ORDER — FUROSEMIDE 10 MG/ML
20 INJECTION INTRAMUSCULAR; INTRAVENOUS
Status: COMPLETED | OUTPATIENT
Start: 2024-04-15 | End: 2024-04-15

## 2024-04-15 RX ORDER — METHYLPREDNISOLONE SOD SUCC 125 MG
40 VIAL (EA) INJECTION DAILY
Status: DISCONTINUED | OUTPATIENT
Start: 2024-04-16 | End: 2024-04-19 | Stop reason: HOSPADM

## 2024-04-15 RX ADMIN — PIPERACILLIN AND TAZOBACTAM 4.5 G: 4; .5 INJECTION, POWDER, LYOPHILIZED, FOR SOLUTION INTRAVENOUS; PARENTERAL at 03:04

## 2024-04-15 RX ADMIN — IPRATROPIUM BROMIDE AND ALBUTEROL SULFATE 3 ML: .5; 3 SOLUTION RESPIRATORY (INHALATION) at 03:04

## 2024-04-15 RX ADMIN — IPRATROPIUM BROMIDE AND ALBUTEROL SULFATE 3 ML: .5; 3 SOLUTION RESPIRATORY (INHALATION) at 01:04

## 2024-04-15 RX ADMIN — IPRATROPIUM BROMIDE AND ALBUTEROL SULFATE 3 ML: 2.5; .5 SOLUTION RESPIRATORY (INHALATION) at 08:04

## 2024-04-15 RX ADMIN — MAGNESIUM SULFATE 1 G: 1 INJECTION INTRAVENOUS at 03:04

## 2024-04-15 RX ADMIN — IPRATROPIUM BROMIDE AND ALBUTEROL SULFATE 3 ML: .5; 3 SOLUTION RESPIRATORY (INHALATION) at 02:04

## 2024-04-15 RX ADMIN — METHYLPREDNISOLONE SODIUM SUCCINATE 125 MG: 125 INJECTION, POWDER, FOR SOLUTION INTRAMUSCULAR; INTRAVENOUS at 01:04

## 2024-04-15 RX ADMIN — ENOXAPARIN SODIUM 40 MG: 40 INJECTION SUBCUTANEOUS at 05:04

## 2024-04-15 RX ADMIN — AZITHROMYCIN MONOHYDRATE 500 MG: 500 INJECTION, POWDER, LYOPHILIZED, FOR SOLUTION INTRAVENOUS at 05:04

## 2024-04-15 RX ADMIN — CEFTRIAXONE 2 G: 2 INJECTION, POWDER, FOR SOLUTION INTRAMUSCULAR; INTRAVENOUS at 11:04

## 2024-04-15 RX ADMIN — FUROSEMIDE 20 MG: 10 INJECTION, SOLUTION INTRAVENOUS at 03:04

## 2024-04-15 RX ADMIN — VANCOMYCIN HYDROCHLORIDE 2000 MG: 500 INJECTION, POWDER, LYOPHILIZED, FOR SOLUTION INTRAVENOUS at 04:04

## 2024-04-15 RX ADMIN — MICONAZOLE NITRATE: 20 POWDER TOPICAL at 08:04

## 2024-04-15 NOTE — PHARMACY MED REC
"Admission Medication History     The home medication history was taken by Adriane Hairston.    You may go to "Admission" then "Reconcile Home Medications" tabs to review and/or act upon these items.     The home medication list has been updated by the Pharmacy department.   Please read ALL comments highlighted in yellow.   Please address this information as you see fit.    Feel free to contact us if you have any questions or require assistance.        Medications listed below were obtained from: Patient/family and Analytic software- [employer]  Current Facility-Administered Medications   Medication Dose Route Frequency Provider Last Rate Last Admin    magnesium sulfate in dextrose IVPB (premix) 1 g  1 g Intravenous ED 1 Time Gwendolyn Chambers  mL/hr at 04/15/24 1549 1 g at 04/15/24 1549    vancomycin (VANCOCIN) 2,000 mg in dextrose 5 % (D5W) 500 mL IVPB  2,000 mg Intravenous Once Gwendolyn Chambers MD        vancomycin - pharmacy to dose   Intravenous pharmacy to manage frequency Gwendolyn Chambers MD         Current Outpatient Medications   Medication Sig Dispense Refill    albuterol (PROVENTIL/VENTOLIN HFA) 90 mcg/actuation inhaler INHALE 2 PUFF INTO LUNGS EVERY 6 HOURS AS NEEDED FOR WHEEZING 25.5 g 3    aspirin (ECOTRIN) 81 MG EC tablet Take 81 mg by mouth once daily.      atorvastatin (LIPITOR) 20 MG tablet Take 1 tablet (20 mg total) by mouth every evening. 90 tablet 3    CETIRIZINE HCL (ZYRTEC ORAL) Take by mouth.      doxycycline (VIBRA-TABS) 100 MG tablet Take 1 tablet (100 mg total) by mouth 2 (two) times daily. for 7 days 14 tablet 0    fluticasone-umeclidin-vilanter (TRELEGY ELLIPTA) 100-62.5-25 mcg DsDv Inhale 1 puff into the lungs once daily. 180 each 3    lisinopriL-hydrochlorothiazide (PRINZIDE,ZESTORETIC) 20-25 mg Tab TAKE 1 TABLET BY MOUTH EVERY DAY 90 tablet 3    predniSONE (DELTASONE) 20 MG tablet Take 2 tablets (40 mg total) by mouth once daily. for 5 days 10 tablet 0           Adriane " [place] [phone]                 .

## 2024-04-15 NOTE — ED NOTES
Family called nurse and provider into room-- pt respirations and HR increased. Pt appears anxious and shaking. Pt denies difficulty swallowing but vancomycin stopped her dr. Chambers.   Respiratory at bedside to change Bipap settings

## 2024-04-15 NOTE — ED PROVIDER NOTES
Encounter Date: 4/15/2024    SCRIBE #1 NOTE: IMER Warnershavonne Gonzalez, am scribing for, and in the presence of,  Gwendolyn Chambers MD.       History     Chief Complaint   Patient presents with    Shortness of Breath     SOB for days. Patient has hx of COPD, takes prednisone daily. EMS administered albuterol with no relief per patient. Patient denies chest pain.      Mrs. Chanel Esparza presents to the ED via EMS, for evaluation of SOB that started 1 week ago. Patient reports of associated wheezing and cough, along with a chest heaviness. Denies any chest pain. Patient reports that she was experiencing a mild asthma flare up since  and that her symptoms worsened after her stress test on . Attempted tx with breathing treatments TID w/o relief for the past week. History provided by independent historian, EMS reports that the patient was complaining of a cough and SOB for several days. Was initially on 92% on RA, with expiratory wheezing and was given a breathing treatment en route. Patient's daily medications include lisinopril-HCTZ, atorvastatin, ASA 81 mg and Zyrtec. Patient was placed on prednisone 40 mg daily and  doxycycline BID by her PCP on . Endorses smoking 1 ppd but denies smoking the past week 2/2 her asthma flareup. Endorses smoking 2 cigarettes today. PMHx significant for asthma, HTN, and emphysema.             The history is provided by the patient and the EMS personnel. No  was used.     Review of patient's allergies indicates:  No Known Allergies  Past Medical History:   Diagnosis Date    Asthma     Emphysema of lung     Hypertension      Past Surgical History:   Procedure Laterality Date     SECTION      CHOLECYSTECTOMY      DIRECT LARYNGOSCOPY N/A 10/03/2023    Procedure: LARYNGOSCOPY, DIRECT;  Surgeon: Lyudmila Barrera MD;  Location: [facility] OR;  Service: ENT;  Laterality: N/A;    EXCISION OF PAROTID GLAND Right 10/03/2023    Procedure: EXCISION, PAROTID GLAND;  Surgeon:  Lyudmila Barrera MD;  Location: [facility];  Service: ENT;  Laterality: Right;  NEUROMONITORING CAITY ANGELO [phone]    RN PREOP 2023---CONSENTS INCOMPLETE    EYE SURGERY      Cataract Surgery    HYSTERECTOMY      CAPRICE    TONSILLECTOMY      TUBAL LIGATION       Family History   Problem Relation Name Age of Onset    Hypertension Mother Pat     Diabetes Sister Eli     Diabetes Brother Vincent      Social History     Tobacco Use    Smoking status: Some Days     Current packs/day: 0.00     Average packs/day: 1 pack/day for 55.0 years (55.0 ttl pk-yrs)     Types: Cigarettes     Start date: 1968     Last attempt to quit: 3/5/2018     Years since quittin.1    Smokeless tobacco: Never   Substance Use Topics    Alcohol use: No    Drug use: No     Review of Systems   Respiratory:  Positive for cough, chest tightness (described as heavyness), shortness of breath and wheezing.    Cardiovascular:  Negative for chest pain.   All other systems reviewed and are negative.      Physical Exam     Initial Vitals [04/15/24 1310]   BP Pulse Resp Temp SpO2   (!) 178/91 (!) 117 (!) 26 97.5 °F (36.4 °C) 100 %      MAP       --         Physical Exam    Nursing note and vitals reviewed.  Constitutional: She appears well-developed and well-nourished. She is not diaphoretic. No distress.   Nebulizer in place.    HENT:   Head: Normocephalic and atraumatic.   Eyes: EOM are normal.   Cardiovascular:  Regular rhythm and normal heart sounds.   Tachycardia present.         No murmur heard.  Pulmonary/Chest: Tachypnea (Mild) noted. She is in respiratory distress (Mild). She has wheezes (Diffuse expiratory in all lung fields.).   Speaking in short complete sentences.    Abdominal: Abdomen is soft. Bowel sounds are normal. She exhibits no distension and no mass. There is no abdominal tenderness. There is no guarding.   Musculoskeletal:         General: No tenderness or edema. Normal range of motion.     Neurological: She is  alert and oriented to person, place, and time. GCS score is 15. GCS eye subscore is 4. GCS verbal subscore is 5. GCS motor subscore is 6.   Skin: Skin is warm and dry. No rash noted. No erythema.   Psychiatric: She has a normal mood and affect. Her behavior is normal. Judgment and thought content normal.         ED Course   Critical Care    Date/Time: 4/16/2024 8:51 AM    Performed by: Gwendolyn Chambers MD  Authorized by: Corky Tovar MD  Direct patient critical care time: 15 minutes  Ordering / reviewing critical care time: 5 minutes  Documentation critical care time: 5 minutes  Consulting other physicians critical care time: 5 minutes  Total critical care time (exclusive of procedural time) : 30 minutes        Labs Reviewed   CBC W/ AUTO DIFFERENTIAL - Abnormal; Notable for the following components:       Result Value    RBC 5.81 (*)     Hemoglobin 16.3 (*)     Hematocrit 50.4 (*)     Immature Granulocytes 0.6 (*)     Gran # (ANC) 8.1 (*)     Immature Grans (Abs) 0.05 (*)     Lymph # 0.7 (*)     Mono # 0.2 (*)     Gran % 89.1 (*)     Lymph % 7.7 (*)     Mono % 2.4 (*)     All other components within normal limits   COMPREHENSIVE METABOLIC PANEL - Abnormal; Notable for the following components:    Glucose 141 (*)     BUN 32 (*)     eGFR 54 (*)     All other components within normal limits   B-TYPE NATRIURETIC PEPTIDE - Abnormal; Notable for the following components:     (*)     All other components within normal limits   LACTIC ACID, PLASMA - Abnormal; Notable for the following components:    Lactate (Lactic Acid) 2.6 (*)     All other components within normal limits   ISTAT PROCEDURE - Abnormal; Notable for the following components:    POC PCO2 53.2 (*)     POC HCO3 29.5 (*)     POC BE 3 (*)     POC TCO2 31 (*)     All other components within normal limits   ISTAT PROCEDURE - Abnormal; Notable for the following components:    POC PH 7.295 (*)     POC PCO2 61.6 (*)     POC PO2 28 (*)     POC HCO3  30.0 (*)     POC TCO2 32 (*)     All other components within normal limits   ISTAT PROCEDURE - Abnormal; Notable for the following components:    POC PH 7.326 (*)     POC PCO2 52.1 (*)     All other components within normal limits   RESPIRATORY INFECTION PANEL (PCR), NASOPHARYNGEAL   TROPONIN I   MAGNESIUM   RESPIRATORY PATHOGENS PANEL (TEM-PCR)     EKG Readings: (Independently Interpreted)   Initial Reading: No STEMI. Previous EKG: Compared with most recent EKG Rhythm: Sinus Tachycardia. Ectopy: No Ectopy. Conduction: RBBB.     ECG Results              EKG 12-lead (Final result)        Collection Time Result Time QRS Duration OHS QTC Calculation    04/15/24 13:43:45 04/15/24 18:42:13 120 495                     Final result by Interface, Lab In [facility] (04/15/24 18:42:25)                   Narrative:    Test Reason : R06.02,    Vent. Rate : 109 BPM     Atrial Rate : 109 BPM     P-R Int : 136 ms          QRS Dur : 120 ms      QT Int : 368 ms       P-R-T Axes : 067 038 036 degrees     QTc Int : 495 ms    Sinus tachycardia  Right bundle branch block  Abnormal ECG  When compared with ECG of 19-MAR-2024 14:42,  No significant change was found  Confirmed by Abhijeet Boogie MD ([id number]) on 4/15/2024 6:42:10 PM    Referred By: [email or web address]   SELF           Confirmed By:Abhijeet Boogie MD                                  Imaging Results              X-Ray Chest AP Portable (Final result)  Result time 04/15/24 14:32:38      Final result by Jhon Downs MD (04/15/24 14:32:38)                   Impression:      As above      Electronically signed by: John Downs MD  Date:    04/15/2024  Time:    14:32               Narrative:    EXAMINATION:  XR CHEST AP PORTABLE    CLINICAL HISTORY:  Dyspnea, unspecified    TECHNIQUE:  AP portable radiograph of the chest was performed.    COMPARISON:  09/26/2023    FINDINGS:  Multiple overlying cardiac monitoring leads. The cardiomediastinal silhouette is normal in size and midline.   Atherosclerotic calcification of the aortic arch.  Pulmonary vascularity appears within normal limits.    The lungs appear symmetrically expanded.  Few coarse interstitial markings, without confluent pulmonary parenchymal opacity. No pleural fluid or pneumothorax.                                       Medications   vancomycin - pharmacy to dose (has no administration in time range)   vancomycin 1,250 mg in dextrose 5 % (D5W) 250 mL IVPB (Vial-Mate) (1,250 mg Intravenous Trough Due As Scheduled Before Dose 4/17/24 1530)   aspirin EC tablet 81 mg (81 mg Oral Given 4/16/24 0851)   atorvastatin tablet 20 mg (20 mg Oral Not Given 4/15/24 2009)   sodium chloride 0.9% flush 3 mL (has no administration in time range)   albuterol-ipratropium 2.5 mg-0.5 mg/3 mL nebulizer solution 3 mL (3 mLs Nebulization Given 4/16/24 0731)   enoxaparin injection 40 mg (40 mg Subcutaneous Given 4/15/24 1724)   cefTRIAXone (ROCEPHIN) 2 g in dextrose 5 % in water (D5W) 100 mL IVPB (MB+) (0 g Intravenous Stopped 4/16/24 0028)   azithromycin (ZITHROMAX) 500 mg in dextrose 5 % (D5W) 250 mL IVPB (Vial-Mate) (0 mg Intravenous Stopped 4/15/24 1827)   ondansetron injection 4 mg (has no administration in time range)   methylPREDNISolone sodium succinate injection 40 mg (40 mg Intravenous Given 4/16/24 0851)   acetaminophen tablet 650 mg (has no administration in time range)   miconazole NITRATE 2 % top powder ( Topical (Top) Given 4/16/24 0851)   potassium chloride SA CR tablet 40 mEq (has no administration in time range)   albuterol-ipratropium 2.5 mg-0.5 mg/3 mL nebulizer solution 3 mL (3 mLs Nebulization Given 4/15/24 1345)   methylPREDNISolone sodium succinate injection 125 mg (125 mg Intravenous Given 4/15/24 1349)   albuterol-ipratropium 2.5 mg-0.5 mg/3 mL nebulizer solution 3 mL (3 mLs Nebulization Given 4/15/24 1500)   piperacillin-tazobactam (ZOSYN) 4.5 g in dextrose 5 % in water (D5W) 100 mL IVPB (MB+) (0 g Intravenous Stopped 4/15/24 4380)    furosemide injection 20 mg (20 mg Intravenous Given 4/15/24 1533)   vancomycin (VANCOCIN) 2,000 mg in dextrose 5 % (D5W) 500 mL IVPB (0 mg Intravenous Stopped 4/15/24 1637)   magnesium sulfate in dextrose IVPB (premix) 1 g (0 g Intravenous Stopped 4/15/24 1637)     Medical Decision Making  68-year-old with shortness of breath, wheezing, URI type symptoms for about a week.  Placed on 40 of prednisone daily and doxycycline twice a day by her PCP on 04/12.  Reports wheezing and shortness of breath worsened 3 days ago, the evening after getting a stress test.  No problems during stress test.  She denies chest pain or fevers.  She has been taking neb treatments 3 times a day for the past week.  She reports she feels heaviness on her chest for several days but no pain.  Differential diagnosis is broad and includes but is not limited to COPD flare, asthma, cardiac, pulmonary, metabolic, infectious, inflammatory and other etiologies.  Patient smokes 1 pack of cigarettes daily, has not been smoking for the past few days because of her symptoms, but did this morning. Gwendolyn Chambers MD, 04/15/2024 1:41 PM    Pt received 2 triple treatments of duonebs. She remains tachypneic with expiratory wheezing. Mag iv ordered. Solumedrol given just after arrival.   Pt seems to have improved minimally. Will admit to ICU for further close care and I have spoken with HM and pulmonary who will see pt in ER. Will place pt on bipap for further care.     Checked on pt after bipap. She intermittently is more tachypneic but is able to be calmed and learn how to work with the machine. With calming discussion, she has improved and is more comfortable upon admission to icu. Tolerating bipap well. Dr. Blaze fragoso HM at bedside.     Labs and imaging reviewed, no overt signs of infection but pt given abx due to risk factors, clinical picture, ,smoking hx.       Amount and/or Complexity of Data Reviewed  Independent Historian: EMS     Details: See HPI.    Labs: ordered. Decision-making details documented in ED Course.  Radiology: ordered. Decision-making details documented in ED Course.  ECG/medicine tests: ordered and independent interpretation performed. Decision-making details documented in ED Course.    Risk  Prescription drug management.  Decision regarding hospitalization.            Scribe Attestation:   Scribe #1: I performed the above scribed service and the documentation accurately describes the services I performed. I attest to the accuracy of the note.                               Clinical Impression:  Final diagnoses:  [R06.00] Dyspnea  [R06.02] Shortness of breath  [R06.03] Acute respiratory distress (Primary)  [R06.2] Wheezing           I, Gwendolyn Chambers MD, personally performed the services described in this documentation. All medical record entries made by the scribe were at my direction and in my presence. I have reviewed the chart and agree that the record reflects my personal performance and is accurate and complete.           ED Disposition Condition    Admit                 Gwendolyn Chambers MD  04/16/24 0852

## 2024-04-15 NOTE — NURSING
[facility] [place] -- 4 [place]      4/15/2024       Skin assessed on: Admit      [x] No Pressure Injuries Present    []Prevention Measures Documented    [] Yes LDA  for Pressure Injury Previously documented     [] Yes New Pressure Injury Discovered   [] LDA for New Pressure Injury Added      Attending RN:  Kristal Marie RN     Second RN:  THOMAS Ramirez

## 2024-04-15 NOTE — PLAN OF CARE
Problem: Adult Inpatient Plan of Care  Goal: Absence of Hospital-Acquired Illness or Injury  Outcome: Ongoing, Progressing     Problem: Adult Inpatient Plan of Care  Goal: Optimal Comfort and Wellbeing  Outcome: Ongoing, Progressing     Problem: Infection COPD (Chronic Obstructive Pulmonary Disease)  Goal: Absence of Infection Signs and Symptoms  Outcome: Ongoing, Progressing

## 2024-04-15 NOTE — ASSESSMENT & PLAN NOTE
Advance Care Planning     Date: 04/15/2024       I engaged Mrs. Esparza in a conversation regarding goals of care and code status.  Given her respiratory issues at this time, I discussed if she would want to be on a ventilator if needed.  She was very adamant that she did not want to be placed on a ventilator or coded for any type of arrest. She was very clear with her wishes and witnessed by her sister Eli and daughter in law at bedside. If she were not able to make decisions for herself, she would want her sister eli to be decision maker first then her son Will. Otherwise, we are continuing with aggressive care at this time. DNR/DNI was placed in chart.

## 2024-04-15 NOTE — ASSESSMENT & PLAN NOTE
Patient with known COPD/Asthma and is treated with trelegy and PRN albuterol at home.  She denies recent hospitalizations, but did have to visit an urgent care about 2 years ago for abx and steroids.  She smokes about 1PPD for the last 60 years.  She denies sick contacts at this time.  - continue BIPAP for increased WOB with target sats between 88-96%.  - broad spectrum abx started by ER.  It is probably reasonable to de-escalate to rocephin/azithromycin for 5 days course.  If broad spectrum abx are kept, I recommend addition of atypical coverage for 5 days.  - respiratory infection panel, covid, flu testing recommended.  - continue steroids.  Recommend 40mg IV solumedrol daily for now.  - scheduled q4hr duonebs.  - s/p 1 dose magnesium in ER.

## 2024-04-15 NOTE — ED TRIAGE NOTES
SOB for days. Patient has hx of asthma and  COPD, takes prednisone daily. EMS administered albuterol with no relief per patient. Patient denies chest pain, fever, abdominal pain . Pt denies use of oxygen at home. Pt aaox4, respirations labored. Pt 90-92% RA. Pt connected to continuous pulse ox, cardiac monitor and bp

## 2024-04-15 NOTE — ADMISSIONCARE
AdmissionCare    Guideline: COPD - INPT, Inpatient    Based on the indications selected for the patient, the bed status of Inpatient was determined to be MET    The following indications were selected as present at the time of evaluation of the patient:      - Hemodynamic instability, as indicated by 1 or more of the following:    - Vital sign abnormality not readily corrected by appropriate treatment, as indicated by 1 or more of the following:     - Tachycardia that persists despite appropriate treatment (eg, volume repletion, treatment of pain, treatment of underlying cause)   - New or worsened (eg, from baseline) signs or symptoms of COPD (eg, dyspnea, Tachypnea) that persist despite observation care    AdmissionCare documentation entered by: Yovana Suazo    [facility] [employer], 27th edition, Copyright © 2023 [facility] [employer], [facility] All Rights Reserved.  [id number]:00

## 2024-04-15 NOTE — ASSESSMENT & PLAN NOTE
Patient with Hypercapnic and Hypoxic Respiratory failure which is Acute.  she is not on home oxygen. Supplemental oxygen was provided and noted- Oxygen Concentration (%):  [30] 30    .   Signs/symptoms of respiratory failure include- tachypnea, increased work of breathing, respiratory distress, use of accessory muscles, and wheezing. Contributing diagnoses includes - COPD Labs and images were reviewed. Patient Has recent ABG, which has been reviewed. Will treat underlying causes and adjust management of respiratory failure as follows-     - IV Solumedrol 40 mg daily  - Ceftriaxone and Azithromycin coverage  - RIP, COVID and flu swabs pending  - placed on continuous bipap for now - she is very adamant about DNR/DNI  - Monitor in ICU  -check TTE

## 2024-04-15 NOTE — HPI
Chanel Esparza has a past medical history that includes HTN, COPD/Asthma, glaucoma, morbid obesity, parotid gland nodule, and chronic tobacco abuse who presents today via EMS for shortness of breath x 1 week.  She describes wheezing, cough and chest tightness.  She tried to solve this at home with breathing treatments to no avail.  She arrived saturating 92% on room air.  She was given doxycycline and prednisone by PCP on 4/12, but symptoms persisted.      In the ER she was treated with nebulizer therapy, magnesium infusion, and 125 solumedrol injection.  Her initial VBG showed 7.35/53/41/29.  Her repeat VBG showed 7.29/61/28/30 and she was placed on BIPAP.  Despite her worsening blood gas, she states she feels subjectively better since arrival to ER.  She is tolerating BIPAP and appears comfortable at this time.    She denies sick contacts at this time.    Tobacco/vape: 1 PPD x 60 years  Occupation: no occupation exposures  Prior lung disease: COPD/asthma  Pets: 2 cats  Respiratory regimen: trelegy, and PRN albuterol  Prior hospitalization/intubation: went to urgent care about 2 years ago, none since.  Never intubated.  Snoring/apnea: does not have CPAP at home

## 2024-04-15 NOTE — HPI
Ms. Esparza is a 68-year-old female with past medical history of emphysema, asthma, hypertension who presents to the emergency department for worsening shortness breath.  Patient reports her difficulty breathing started last Tuesday and has slowly progressed.  She went for nuclear stress test on Friday which afterwards started to continue to worsen.  She was started on doxycycline and p.o. prednisone as an outpatient however this has not made any improvement.  She continues to wheeze and have difficulty with her breathing.  Reports having a dry cough but denies any fevers or chills.  No chest pain.  Discussed code status with her, she was very adamant she does not want to be resuscitated or put on breathing machine.  Her sister and daughter-in-law were present for this conversation.  Patient confirm DNR/DNI at this time.  She is currently on BiPAP continuous.  Pulmonology has been consulted.She will be admitted to ICU for further management.

## 2024-04-15 NOTE — CONSULTS
[facility] Emergency Dept  Critical Care Medicine  Consult Note    Patient Name: Chanel Esparza  MRN: [id number]  Admission Date: 4/15/2024  Hospital Length of Stay: 0 days  Code Status: Prior  Attending Physician: Stephen Thakur MD   Primary Care Provider: Warner Richey MD   Principal Problem: <principal problem not specified>    Inpatient consult to Pulmonology  Consult performed by: Erik Castle MD  Consult ordered by: Gwendolyn Chambers MD        Subjective:     HPI:  Chanel Esparza has a past medical history that includes HTN, COPD/Asthma, glaucoma, morbid obesity, parotid gland nodule, and chronic tobacco abuse who presents today via EMS for shortness of breath x 1 week.  She describes wheezing, cough and chest tightness.  She tried to solve this at home with breathing treatments to no avail.  She arrived saturating 92% on room air.  She was given doxycycline and prednisone by PCP on , but symptoms persisted.      In the ER she was treated with nebulizer therapy, magnesium infusion, and 125 solumedrol injection.  Her initial VBG showed 7.35/53/41/29.  Her repeat VBG showed 7.29/61/28/30 and she was placed on BIPAP.  Despite her worsening blood gas, she states she feels subjectively better since arrival to ER.  She is tolerating BIPAP and appears comfortable at this time.    She denies sick contacts at this time.    Tobacco/vape: 1 PPD x 60 years  Occupation: no occupation exposures  Prior lung disease: COPD/asthma  Pets: 2 cats  Respiratory regimen: trelegy, and PRN albuterol  Prior hospitalization/intubation: went to urgent care about 2 years ago, none since.  Never intubated.  Snoring/apnea: does not have CPAP at home        Hospital/ICU Course:  No notes on file    Past Medical History:   Diagnosis Date    Asthma     Emphysema of lung     Hypertension        Past Surgical History:   Procedure Laterality Date     SECTION      CHOLECYSTECTOMY      DIRECT LARYNGOSCOPY N/A 10/03/2023    Procedure:  LARYNGOSCOPY, DIRECT;  Surgeon: Lyudmila Barrera MD;  Location: [facility] OR;  Service: ENT;  Laterality: N/A;    EXCISION OF PAROTID GLAND Right 10/03/2023    Procedure: EXCISION, PAROTID GLAND;  Surgeon: Lyudmila Barrera MD;  Location: [facility] OR;  Service: ENT;  Laterality: Right;  NEUROMONITORING CAITY ANGLEO [phone]    RN PREOP 2023---CONSENTS INCOMPLETE    EYE SURGERY      Cataract Surgery    HYSTERECTOMY      CAPRICE    TONSILLECTOMY      TUBAL LIGATION         Review of patient's allergies indicates:  No Known Allergies    Family History       Problem Relation (Age of Onset)    Diabetes Sister, Brother    Hypertension Mother          Tobacco Use    Smoking status: Some Days     Current packs/day: 0.00     Average packs/day: 1 pack/day for 55.0 years (55.0 ttl pk-yrs)     Types: Cigarettes     Start date: 1968     Last attempt to quit: 3/5/2018     Years since quittin.1    Smokeless tobacco: Never   Substance and Sexual Activity    Alcohol use: No    Drug use: No    Sexual activity: Not Currently         Review of Systems   Constitutional:  Positive for activity change and fatigue.   Respiratory:  Positive for cough, chest tightness, shortness of breath and wheezing.    Psychiatric/Behavioral:  Negative for agitation, behavioral problems and confusion.      Objective:     Vital Signs (Most Recent):  Temp: 97.5 °F (36.4 °C) (04/15/24 1310)  Pulse: (!) 137 (04/15/24 1620)  Resp: (!) 38 (04/15/24 1620)  BP: 127/66 (04/15/24 1500)  SpO2: 98 % (04/15/24 1620) Vital Signs (24h Range):  Temp:  [97.5 °F (36.4 °C)] 97.5 °F (36.4 °C)  Pulse:  [] 137  Resp:  [24-38] 38  SpO2:  [86 %-100 %] 98 %  BP: (127-178)/(66-97) 127/66     Weight: 81.6 kg (180 lb)  Body mass index is 35.15 kg/m².      Intake/Output Summary (Last 24 hours) at 4/15/2024 1631  Last data filed at 4/15/2024 1613  Gross per 24 hour   Intake 100 ml   Output --   Net 100 ml        Physical Exam  Constitutional:       General:  She is in acute distress.      Appearance: Normal appearance. She is ill-appearing. She is not toxic-appearing.   HENT:      Head: Normocephalic and atraumatic.      Nose: Nose normal.      Mouth/Throat:      Mouth: Mucous membranes are moist.   Eyes:      General: No scleral icterus.     Extraocular Movements: Extraocular movements intact.      Conjunctiva/sclera: Conjunctivae normal.      Pupils: Pupils are equal, round, and reactive to light.   Cardiovascular:      Rate and Rhythm: Regular rhythm. Tachycardia present.      Heart sounds: No murmur heard.     No friction rub. No gallop.   Pulmonary:      Breath sounds: Wheezing present.      Comments: Moderate increase in WOB, diffuse wheezing.  Prolonged expiratory phase.  Abdominal:      General: Abdomen is flat. There is no distension.      Palpations: Abdomen is soft.      Tenderness: There is no abdominal tenderness.   Musculoskeletal:         General: Normal range of motion.      Cervical back: Normal range of motion.   Skin:     General: Skin is warm.      Capillary Refill: Capillary refill takes less than 2 seconds.   Neurological:      General: No focal deficit present.      Mental Status: She is alert and oriented to person, place, and time. Mental status is at baseline.   Psychiatric:         Mood and Affect: Mood normal.         Behavior: Behavior normal.         Thought Content: Thought content normal.         Judgment: Judgment normal.          Vents:       Lines/Drains/Airways       Drain  Duration                  Closed/Suction Drain 10/03/23 1230 Tube - 1 Right Neck Bulb 10 Fr. 195 days              Peripheral Intravenous Line  Duration                  Peripheral IV - Single Lumen 04/15/24 1347 20 G Left Forearm <1 day                    Significant Labs:    CBC/Anemia Profile:  Recent Labs   Lab 04/15/24  1349   WBC 9.09   HGB 16.3*   HCT 50.4*      MCV 87   RDW 14.1        Chemistries:  Recent Labs   Lab 04/15/24  1349      K 4.2       CO2 26   BUN 32*   CREATININE 1.1   CALCIUM 10.1   ALBUMIN 3.7   PROT 7.9   BILITOT 0.7   ALKPHOS 59   ALT 27   AST 26   MG 1.8       All pertinent labs within the past 24 hours have been reviewed.    Significant Imaging:   I have reviewed all pertinent imaging results/findings within the past 24 hours.    ABG  Recent Labs   Lab 04/15/24  1540   PH 7.295*   PO2 28*   PCO2 61.6*   HCO3 30.0*   BE 1     Assessment/Plan:     Pulmonary  Acute respiratory failure with hypoxia and hypercapnia  - see COPD exacerbation section.    COPD exacerbation  Patient with known COPD/Asthma and is treated with trelegy and PRN albuterol at home.  She denies recent hospitalizations, but did have to visit an urgent care about 2 years ago for abx and steroids.  She smokes about 1PPD for the last 60 years.  She denies sick contacts at this time.  - continue BIPAP for increased WOB with target sats between 88-96%.  - broad spectrum abx started by ER.  It is probably reasonable to de-escalate to rocephin/azithromycin for 5 days course.  If broad spectrum abx are kept, I recommend addition of atypical coverage for 5 days.  - respiratory infection panel, covid, flu testing recommended.  - continue steroids.  Recommend 40mg IV solumedrol daily for now.  - scheduled q4hr duonebs.  - s/p 1 dose magnesium in ER.    Cardiac/Vascular  Elevated brain natriuretic peptide (BNP) level  Recommend TTE.  Possible diuresis indicated.    Endocrine  Morbid obesity  Body mass index is 35.15 kg/m². Morbid obesity complicates all aspects of disease management from diagnostic modalities to treatment. Weight loss encouraged and health benefits explained to patient.          Critical Care Time: 50 minutes  Critical secondary to Patient has a condition that poses threat to life and bodily function: Severe Respiratory Distress     Critical care was time spent personally by me on the following activities: development of treatment plan with patient or  surrogate and bedside caregivers, discussions with consultants, evaluation of patient's response to treatment, examination of patient, ordering and performing treatments and interventions, ordering and review of laboratory studies, ordering and review of radiographic studies, pulse oximetry, re-evaluation of patient's condition. This critical care time did not overlap with that of any other provider or involve time for any procedures.    Thank you for your consult. I will follow-up with patient. Please contact us if you have any additional questions.     Erik Castle MD  Critical Care Medicine  [facility] [place] [facility] Emergency Dept

## 2024-04-15 NOTE — PROGRESS NOTES
"Pharmacokinetic Initial Assessment: IV Vancomycin    Assessment/Plan:    Initiate intravenous vancomycin with loading dose of 2000 mg once followed by a maintenance dose of vancomycin 1250mg IV every 24 hours  Desired empiric serum trough concentration is 10 to 20 mcg/mL  Draw vancomycin trough level 60 min prior to third dose on 4/17 at approximately 1530  Pharmacy will continue to follow and monitor vancomycin.      Please contact pharmacy at extension [phone] with any questions regarding this assessment.     Thank you for the consult,   Anjelica Najera       Patient brief summary:  Chanel Esparza is a 68 y.o. female initiated on antimicrobial therapy with IV Vancomycin for treatment of suspected  pneumonia    Drug Allergies:   Review of patient's allergies indicates:  No Known Allergies    Actual Body Weight:   81.6 kg    Renal Function:   Estimated Creatinine Clearance: 45 mL/min (based on SCr of 1.1 mg/dL).,     Dialysis Method (if applicable):  N/A    CBC (last 72 hours):  Recent Labs   Lab Result Units 04/15/24  1349   WBC K/uL 9.09   Hemoglobin g/dL 16.3*   Hematocrit % 50.4*   Platelets K/uL 178   Gran % % 89.1*   Lymph % % 7.7*   Mono % % 2.4*   Eosinophil % % 0.1   Basophil % % 0.1   Differential Method  Automated       Metabolic Panel (last 72 hours):  Recent Labs   Lab Result Units 04/15/24  1349   Sodium mmol/L 138   Potassium mmol/L 4.2   Chloride mmol/L 102   CO2 mmol/L 26   Glucose mg/dL 141*   BUN mg/dL 32*   Creatinine mg/dL 1.1   Albumin g/dL 3.7   Total Bilirubin mg/dL 0.7   Alkaline Phosphatase U/L 59   AST U/L 26   ALT U/L 27   Magnesium mg/dL 1.8       Drug levels (last 3 results):  No results for input(s): "VANCOMYCINRA", "VANCORANDOM", "VANCOMYCINPE", "VANCOPEAK", "VANCOMYCINTR", "VANCOTROUGH" in the last 72 hours.    Microbiologic Results:  Microbiology Results (last 7 days)       Procedure Component Value Units Date/Time    Blood Culture #1 **CANNOT BE ORDERED STAT** [[id number]] Collected: " 04/15/24 1533    Order Status: Sent Specimen: Blood from Peripheral, Antecubital, Right Updated: 04/15/24 1557    Blood Culture #2 **CANNOT BE ORDERED STAT** [[id number]] Collected: 04/15/24 1533    Order Status: Sent Specimen: Blood from Peripheral, Forearm, Left Updated: 04/15/24 1551

## 2024-04-15 NOTE — H&P
[facility] Medicine  History & Physical    Patient Name: Chanel Esparza  MRN: [id number]  Patient Class: IP- Inpatient  Admission Date: 4/15/2024  Attending Physician: Stephen Thakur MD   Primary Care Provider: Warner Richey MD         Patient information was obtained from patient, past medical records, and ER records.     Subjective:     Principal Problem:Acute respiratory failure with hypoxia and hypercapnia    Chief Complaint:   Chief Complaint   Patient presents with    Shortness of Breath     SOB for days. Patient has hx of COPD, takes prednisone daily. EMS administered albuterol with no relief per patient. Patient denies chest pain.         HPI: Ms. Esparza is a 68-year-old female with past medical history of emphysema, asthma, hypertension who presents to the emergency department for worsening shortness breath.  Patient reports her difficulty breathing started last Tuesday and has slowly progressed.  She went for nuclear stress test on Friday which afterwards started to continue to worsen.  She was started on doxycycline and p.o. prednisone as an outpatient however this has not made any improvement.  She continues to wheeze and have difficulty with her breathing.  Reports having a dry cough but denies any fevers or chills.  No chest pain.  Discussed code status with her, she was very adamant she does not want to be resuscitated or put on breathing machine.  Her sister and daughter-in-law were present for this conversation.  Patient confirm DNR/DNI at this time.  She is currently on BiPAP continuous.  Pulmonology has been consulted.She will be admitted to ICU for further management.    Past Medical History:   Diagnosis Date    Asthma     Emphysema of lung     Hypertension        Past Surgical History:   Procedure Laterality Date     SECTION      CHOLECYSTECTOMY      DIRECT LARYNGOSCOPY N/A 10/03/2023    Procedure: LARYNGOSCOPY, DIRECT;  Surgeon: Lyudmila Barrera MD;   Location: [facility] OR;  Service: ENT;  Laterality: N/A;    EXCISION OF PAROTID GLAND Right 10/03/2023    Procedure: EXCISION, PAROTID GLAND;  Surgeon: Lyudmila Barrera MD;  Location: [facility] OR;  Service: ENT;  Laterality: Right;  NEUROMONITORING CAITY ANGELO [phone]    RN PREOP 9/26/2023---CONSENTS INCOMPLETE    EYE SURGERY  2022    Cataract Surgery    HYSTERECTOMY      CAPRICE    TONSILLECTOMY      TUBAL LIGATION  1973       Review of patient's allergies indicates:  No Known Allergies    Current Facility-Administered Medications   Medication Dose Route Frequency Provider Last Rate Last Admin    acetaminophen tablet 650 mg  650 mg Oral Q8H PRN Stephen Thakur MD        albuterol-ipratropium 2.5 mg-0.5 mg/3 mL nebulizer solution 3 mL  3 mL Nebulization Q6H WAKE Stephen Thakur MD        [START ON 4/16/2024] aspirin EC tablet 81 mg  81 mg Oral Daily Stephen Thakur MD        atorvastatin tablet 20 mg  20 mg Oral QHS Stephen Thakur MD        azithromycin (ZITHROMAX) 500 mg in dextrose 5 % (D5W) 250 mL IVPB (Vial-Mate)  500 mg Intravenous Q24H Stephen Thakur MD        [START ON 4/16/2024] cefTRIAXone (ROCEPHIN) 2 g in dextrose 5 % in water (D5W) 100 mL IVPB (MB+)  2 g Intravenous Q24H Stephen Thakur MD        enoxaparin injection 40 mg  40 mg Subcutaneous Daily Stephen Thakur MD        [START ON 4/16/2024] methylPREDNISolone sodium succinate injection 40 mg  40 mg Intravenous Daily Stephen Thakur MD        ondansetron injection 4 mg  4 mg Intravenous Q12H PRN Stephen Thakur MD        sodium chloride 0.9% flush 3 mL  3 mL Intravenous PRN Stephen Thakur MD        vancomycin - pharmacy to dose   Intravenous pharmacy to manage frequency Gwendolyn hCambers MD        [START ON 4/16/2024] vancomycin 1,250 mg in dextrose 5 % (D5W) 250 mL IVPB (Vial-Mate)  1,250 mg Intravenous Q24H Gwendolyn Chambers MD         Current Outpatient Medications   Medication Sig Dispense Refill    albuterol (PROVENTIL/VENTOLIN  HFA) 90 mcg/actuation inhaler INHALE 2 PUFF INTO LUNGS EVERY 6 HOURS AS NEEDED FOR WHEEZING 25.5 g 3    aspirin (ECOTRIN) 81 MG EC tablet Take 81 mg by mouth once daily.      atorvastatin (LIPITOR) 20 MG tablet Take 1 tablet (20 mg total) by mouth every evening. 90 tablet 3    CETIRIZINE HCL (ZYRTEC ORAL) Take by mouth.      doxycycline (VIBRA-TABS) 100 MG tablet Take 1 tablet (100 mg total) by mouth 2 (two) times daily. for 7 days 14 tablet 0    fluticasone-umeclidin-vilanter (TRELEGY ELLIPTA) 100-62.5-25 mcg DsDv Inhale 1 puff into the lungs once daily. 180 each 3    lisinopriL-hydrochlorothiazide (PRINZIDE,ZESTORETIC) 20-25 mg Tab TAKE 1 TABLET BY MOUTH EVERY DAY 90 tablet 3    predniSONE (DELTASONE) 20 MG tablet Take 2 tablets (40 mg total) by mouth once daily. for 5 days 10 tablet 0     Family History       Problem Relation (Age of Onset)    Diabetes Sister, Brother    Hypertension Mother          Tobacco Use    Smoking status: Some Days     Current packs/day: 0.00     Average packs/day: 1 pack/day for 55.0 years (55.0 ttl pk-yrs)     Types: Cigarettes     Start date: 1968     Last attempt to quit: 3/5/2018     Years since quittin.1    Smokeless tobacco: Never   Substance and Sexual Activity    Alcohol use: No    Drug use: No    Sexual activity: Not Currently     Review of Systems  Objective:     Vital Signs (Most Recent):  Temp: 97.5 °F (36.4 °C) (04/15/24 1310)  Pulse: (!) 139 (04/15/24 1651)  Resp: (!) 38 (04/15/24 1620)  BP: 127/66 (04/15/24 1500)  SpO2: 98 % (04/15/24 1651) Vital Signs (24h Range):  Temp:  [97.5 °F (36.4 °C)] 97.5 °F (36.4 °C)  Pulse:  [] 139  Resp:  [24-38] 38  SpO2:  [86 %-100 %] 98 %  BP: (127-178)/(66-97) 127/66     Weight: 81.6 kg (180 lb)  Body mass index is 35.15 kg/m².     Physical Exam  Vitals and nursing note reviewed.   Constitutional:       General: She is in acute distress.      Appearance: She is ill-appearing.   Cardiovascular:      Rate and Rhythm: Regular  rhythm. Tachycardia present.      Pulses: Normal pulses.      Heart sounds: No murmur heard.  Pulmonary:      Effort: Pulmonary effort is normal.      Breath sounds: Wheezing present.   Abdominal:      General: Bowel sounds are normal. There is no distension.   Musculoskeletal:         General: No swelling.   Skin:     General: Skin is warm.   Neurological:      General: No focal deficit present.      Mental Status: She is alert and oriented to person, place, and time.   Psychiatric:         Mood and Affect: Mood normal.         Thought Content: Thought content normal.                Significant Labs: All pertinent labs within the past 24 hours have been reviewed.    Significant Imaging: I have reviewed all pertinent imaging results/findings within the past 24 hours.  Assessment/Plan:     * Acute respiratory failure with hypoxia and hypercapnia  Patient with Hypercapnic and Hypoxic Respiratory failure which is Acute.  she is not on home oxygen. Supplemental oxygen was provided and noted- Oxygen Concentration (%):  [30] 30    .   Signs/symptoms of respiratory failure include- tachypnea, increased work of breathing, respiratory distress, use of accessory muscles, and wheezing. Contributing diagnoses includes - COPD Labs and images were reviewed. Patient Has recent ABG, which has been reviewed. Will treat underlying causes and adjust management of respiratory failure as follows-     - IV Solumedrol 40 mg daily  - Ceftriaxone and Azithromycin coverage  - RIP, COVID and flu swabs pending  - placed on continuous bipap for now - she is very adamant about DNR/DNI  - Monitor in ICU  -check TTE    Advanced care planning/counseling discussion  Advance Care Planning    Date: 04/15/2024       I engaged Mrs. Esparza in a conversation regarding goals of care and code status.  Given her respiratory issues at this time, I discussed if she would want to be on a ventilator if needed.  She was very adamant that she did not want to be  placed on a ventilator or coded for any type of arrest. She was very clear with her wishes and witnessed by her sister Eli and daughter in law at bedside. If she were not able to make decisions for herself, she would want her sister eli to be decision maker first then her son Will. Otherwise, we are continuing with aggressive care at this time. DNR/DNI was placed in chart.      Elevated brain natriuretic peptide (BNP) level  BNP elevated >500 -- check formal TTE though recent stress test noted EF 61% post stress and read as normal  - fluid sparing at this time      COPD exacerbation  Patient's COPD is with exacerbation noted by continued dyspnea, use of accessory muscles for breathing, and worsening of baseline hypoxia currently.  Patient is currently on COPD Pathway. Continue scheduled inhalers Steroids, Antibiotics, and Supplemental oxygen and monitor respiratory status closely.       VTE Risk Mitigation (From admission, onward)           Ordered     enoxaparin injection 40 mg  Daily         04/15/24 1653     IP VTE HIGH RISK PATIENT  Once         04/15/24 1653     Place sequential compression device  Until discontinued         04/15/24 1653                               Pharmacokinetic Initial Assessment: IV Vancomycin    Assessment/Plan:    Initiate intravenous vancomycin with loading dose of 2000 mg once followed by a maintenance dose of vancomycin 1250mg IV every 24 hours  Desired empiric serum trough concentration is 10 to 20 mcg/mL  Draw vancomycin trough level 60 min prior to third dose on 4/17 at approximately 1530  Pharmacy will continue to follow and monitor vancomycin.      Please contact pharmacy at extension [phone] with any questions regarding this assessment.     Thank you for the consult,   Anjelica Najera       Patient brief summary:  Chanel Esparza is a 68 y.o. female initiated on antimicrobial therapy with IV Vancomycin for treatment of suspected  pneumonia    Drug Allergies:   Review of patient's  "allergies indicates:  No Known Allergies    Actual Body Weight:   81.6 kg    Renal Function:   Estimated Creatinine Clearance: 45 mL/min (based on SCr of 1.1 mg/dL).,     Dialysis Method (if applicable):  N/A    CBC (last 72 hours):  Recent Labs   Lab Result Units 04/15/24  1349   WBC K/uL 9.09   Hemoglobin g/dL 16.3*   Hematocrit % 50.4*   Platelets K/uL 178   Gran % % 89.1*   Lymph % % 7.7*   Mono % % 2.4*   Eosinophil % % 0.1   Basophil % % 0.1   Differential Method  Automated       Metabolic Panel (last 72 hours):  Recent Labs   Lab Result Units 04/15/24  1349   Sodium mmol/L 138   Potassium mmol/L 4.2   Chloride mmol/L 102   CO2 mmol/L 26   Glucose mg/dL 141*   BUN mg/dL 32*   Creatinine mg/dL 1.1   Albumin g/dL 3.7   Total Bilirubin mg/dL 0.7   Alkaline Phosphatase U/L 59   AST U/L 26   ALT U/L 27   Magnesium mg/dL 1.8       Drug levels (last 3 results):  No results for input(s): "VANCOMYCINRA", "VANCORANDOM", "VANCOMYCINPE", "VANCOPEAK", "VANCOMYCINTR", "VANCOTROUGH" in the last 72 hours.    Microbiologic Results:  Microbiology Results (last 7 days)       Procedure Component Value Units Date/Time    Blood Culture #1 **CANNOT BE ORDERED STAT** [[id number]] Collected: 04/15/24 1533    Order Status: Sent Specimen: Blood from Peripheral, Antecubital, Right Updated: 04/15/24 1557    Blood Culture #2 **CANNOT BE ORDERED STAT** [[id number]] Collected: 04/15/24 1533    Order Status: Sent Specimen: Blood from Peripheral, Forearm, Left Updated: 04/15/24 1557              AdmissionCare    Guideline: COPD - INPT, Inpatient    Based on the indications selected for the patient, the bed status of Inpatient was determined to be MET    The following indications were selected as present at the time of evaluation of the patient:      - Hemodynamic instability, as indicated by 1 or more of the following:    - Vital sign abnormality not readily corrected by appropriate treatment, as indicated by 1 or more of the following:     - " Tachycardia that persists despite appropriate treatment (eg, volume repletion, treatment of pain, treatment of underlying cause)   - New or worsened (eg, from baseline) signs or symptoms of COPD (eg, dyspnea, Tachypnea) that persist despite observation care    AdmissionCare documentation entered by: Yovana Suazo    [facility], 27th edition, Copyright © 2023 [facility] [employer], [facility] All Rights Reserved.  [id number]    Stephen Thakur MD  Department of Hospital Medicine  [facility] - [place] - Emergency Dept

## 2024-04-15 NOTE — SUBJECTIVE & OBJECTIVE
Past Medical History:   Diagnosis Date    Asthma     Emphysema of lung     Hypertension        Past Surgical History:   Procedure Laterality Date     SECTION      CHOLECYSTECTOMY      DIRECT LARYNGOSCOPY N/A 10/03/2023    Procedure: LARYNGOSCOPY, DIRECT;  Surgeon: Lyudmila Barrera MD;  Location: [facility] OR;  Service: ENT;  Laterality: N/A;    EXCISION OF PAROTID GLAND Right 10/03/2023    Procedure: EXCISION, PAROTID GLAND;  Surgeon: Lyudmila Barrera MD;  Location: [facility] OR;  Service: ENT;  Laterality: Right;  NEUROMONITORING CAITY ANGELO [phone]    RN PREOP 2023---CONSENTS INCOMPLETE    EYE SURGERY      Cataract Surgery    HYSTERECTOMY      CAPRICE    TONSILLECTOMY      TUBAL LIGATION         Review of patient's allergies indicates:  No Known Allergies    Current Facility-Administered Medications   Medication Dose Route Frequency Provider Last Rate Last Admin    acetaminophen tablet 650 mg  650 mg Oral Q8H PRN Stephen Thakur MD        albuterol-ipratropium 2.5 mg-0.5 mg/3 mL nebulizer solution 3 mL  3 mL Nebulization Q6H WAKE Stephen Thakur MD        [START ON 2024] aspirin EC tablet 81 mg  81 mg Oral Daily Stephen Thakur MD        atorvastatin tablet 20 mg  20 mg Oral QHS Stephen Thakur MD        azithromycin (ZITHROMAX) 500 mg in dextrose 5 % (D5W) 250 mL IVPB (Vial-Mate)  500 mg Intravenous Q24H Stephen Thakur MD        [START ON 2024] cefTRIAXone (ROCEPHIN) 2 g in dextrose 5 % in water (D5W) 100 mL IVPB (MB+)  2 g Intravenous Q24H Stephen Thakur MD        enoxaparin injection 40 mg  40 mg Subcutaneous Daily Stephen Thakur MD        [START ON 2024] methylPREDNISolone sodium succinate injection 40 mg  40 mg Intravenous Daily Stephen Thakur MD        ondansetron injection 4 mg  4 mg Intravenous Q12H PRN Stephen Thakur MD        sodium chloride 0.9% flush 3 mL  3 mL Intravenous PRN Stephen Thakur MD        vancomycin - pharmacy to dose   Intravenous  pharmacy to manage frequency Gwendolyn Chambers MD        [START ON 2024] vancomycin 1,250 mg in dextrose 5 % (D5W) 250 mL IVPB (Vial-Mate)  1,250 mg Intravenous Q24H Gwendolyn Chambers MD         Current Outpatient Medications   Medication Sig Dispense Refill    albuterol (PROVENTIL/VENTOLIN HFA) 90 mcg/actuation inhaler INHALE 2 PUFF INTO LUNGS EVERY 6 HOURS AS NEEDED FOR WHEEZING 25.5 g 3    aspirin (ECOTRIN) 81 MG EC tablet Take 81 mg by mouth once daily.      atorvastatin (LIPITOR) 20 MG tablet Take 1 tablet (20 mg total) by mouth every evening. 90 tablet 3    CETIRIZINE HCL (ZYRTEC ORAL) Take by mouth.      doxycycline (VIBRA-TABS) 100 MG tablet Take 1 tablet (100 mg total) by mouth 2 (two) times daily. for 7 days 14 tablet 0    fluticasone-umeclidin-vilanter (TRELEGY ELLIPTA) 100-62.5-25 mcg DsDv Inhale 1 puff into the lungs once daily. 180 each 3    lisinopriL-hydrochlorothiazide (PRINZIDE,ZESTORETIC) 20-25 mg Tab TAKE 1 TABLET BY MOUTH EVERY DAY 90 tablet 3    predniSONE (DELTASONE) 20 MG tablet Take 2 tablets (40 mg total) by mouth once daily. for 5 days 10 tablet 0     Family History       Problem Relation (Age of Onset)    Diabetes Sister, Brother    Hypertension Mother          Tobacco Use    Smoking status: Some Days     Current packs/day: 0.00     Average packs/day: 1 pack/day for 55.0 years (55.0 ttl pk-yrs)     Types: Cigarettes     Start date: 1968     Last attempt to quit: 3/5/2018     Years since quittin.1    Smokeless tobacco: Never   Substance and Sexual Activity    Alcohol use: No    Drug use: No    Sexual activity: Not Currently     Review of Systems  Objective:     Vital Signs (Most Recent):  Temp: 97.5 °F (36.4 °C) (04/15/24 1310)  Pulse: (!) 139 (04/15/24 1651)  Resp: (!) 38 (04/15/24 1620)  BP: 127/66 (04/15/24 1500)  SpO2: 98 % (04/15/24 1651) Vital Signs (24h Range):  Temp:  [97.5 °F (36.4 °C)] 97.5 °F (36.4 °C)  Pulse:  [] 139  Resp:  [24-38] 38  SpO2:  [86 %-100  %] 98 %  BP: (127-178)/(66-97) 127/66     Weight: 81.6 kg (180 lb)  Body mass index is 35.15 kg/m².     Physical Exam  Vitals and nursing note reviewed.   Constitutional:       General: She is in acute distress.      Appearance: She is ill-appearing.   Cardiovascular:      Rate and Rhythm: Regular rhythm. Tachycardia present.      Pulses: Normal pulses.      Heart sounds: No murmur heard.  Pulmonary:      Effort: Pulmonary effort is normal.      Breath sounds: Wheezing present.   Abdominal:      General: Bowel sounds are normal. There is no distension.   Musculoskeletal:         General: No swelling.   Skin:     General: Skin is warm.   Neurological:      General: No focal deficit present.      Mental Status: She is alert and oriented to person, place, and time.   Psychiatric:         Mood and Affect: Mood normal.         Thought Content: Thought content normal.                Significant Labs: All pertinent labs within the past 24 hours have been reviewed.    Significant Imaging: I have reviewed all pertinent imaging results/findings within the past 24 hours.

## 2024-04-15 NOTE — SUBJECTIVE & OBJECTIVE
Past Medical History:   Diagnosis Date    Asthma     Emphysema of lung     Hypertension        Past Surgical History:   Procedure Laterality Date     SECTION      CHOLECYSTECTOMY      DIRECT LARYNGOSCOPY N/A 10/03/2023    Procedure: LARYNGOSCOPY, DIRECT;  Surgeon: Lyudmila Barrera MD;  Location: [facility] OR;  Service: ENT;  Laterality: N/A;    EXCISION OF PAROTID GLAND Right 10/03/2023    Procedure: EXCISION, PAROTID GLAND;  Surgeon: Lyudmila Barrera MD;  Location: [facility] OR;  Service: ENT;  Laterality: Right;  NEUROMONITORING CAITY ANGELO [phone]    RN PREOP 2023---CONSENTS INCOMPLETE    EYE SURGERY      Cataract Surgery    HYSTERECTOMY      CAPRICE    TONSILLECTOMY      TUBAL LIGATION         Review of patient's allergies indicates:  No Known Allergies    Family History       Problem Relation (Age of Onset)    Diabetes Sister, Brother    Hypertension Mother          Tobacco Use    Smoking status: Some Days     Current packs/day: 0.00     Average packs/day: 1 pack/day for 55.0 years (55.0 ttl pk-yrs)     Types: Cigarettes     Start date: 1968     Last attempt to quit: 3/5/2018     Years since quittin.1    Smokeless tobacco: Never   Substance and Sexual Activity    Alcohol use: No    Drug use: No    Sexual activity: Not Currently         Review of Systems   Constitutional:  Positive for activity change and fatigue.   Respiratory:  Positive for cough, chest tightness, shortness of breath and wheezing.    Psychiatric/Behavioral:  Negative for agitation, behavioral problems and confusion.      Objective:     Vital Signs (Most Recent):  Temp: 97.5 °F (36.4 °C) (04/15/24 1310)  Pulse: (!) 137 (04/15/24 1620)  Resp: (!) 38 (04/15/24 1620)  BP: 127/66 (04/15/24 1500)  SpO2: 98 % (04/15/24 1620) Vital Signs (24h Range):  Temp:  [97.5 °F (36.4 °C)] 97.5 °F (36.4 °C)  Pulse:  [] 137  Resp:  [24-38] 38  SpO2:  [86 %-100 %] 98 %  BP: (127-178)/(66-97) 127/66     Weight: 81.6 kg (180  lb)  Body mass index is 35.15 kg/m².      Intake/Output Summary (Last 24 hours) at 4/15/2024 1631  Last data filed at 4/15/2024 1613  Gross per 24 hour   Intake 100 ml   Output --   Net 100 ml        Physical Exam  Constitutional:       General: She is in acute distress.      Appearance: Normal appearance. She is ill-appearing. She is not toxic-appearing.   HENT:      Head: Normocephalic and atraumatic.      Nose: Nose normal.      Mouth/Throat:      Mouth: Mucous membranes are moist.   Eyes:      General: No scleral icterus.     Extraocular Movements: Extraocular movements intact.      Conjunctiva/sclera: Conjunctivae normal.      Pupils: Pupils are equal, round, and reactive to light.   Cardiovascular:      Rate and Rhythm: Regular rhythm. Tachycardia present.      Heart sounds: No murmur heard.     No friction rub. No gallop.   Pulmonary:      Breath sounds: Wheezing present.      Comments: Moderate increase in WOB, diffuse wheezing.  Prolonged expiratory phase.  Abdominal:      General: Abdomen is flat. There is no distension.      Palpations: Abdomen is soft.      Tenderness: There is no abdominal tenderness.   Musculoskeletal:         General: Normal range of motion.      Cervical back: Normal range of motion.   Skin:     General: Skin is warm.      Capillary Refill: Capillary refill takes less than 2 seconds.   Neurological:      General: No focal deficit present.      Mental Status: She is alert and oriented to person, place, and time. Mental status is at baseline.   Psychiatric:         Mood and Affect: Mood normal.         Behavior: Behavior normal.         Thought Content: Thought content normal.         Judgment: Judgment normal.          Vents:       Lines/Drains/Airways       Drain  Duration                  Closed/Suction Drain 10/03/23 1230 Tube - 1 Right Neck Bulb 10 Fr. 195 days              Peripheral Intravenous Line  Duration                  Peripheral IV - Single Lumen 04/15/24 1347 20 G Left  Forearm <1 day                    Significant Labs:    CBC/Anemia Profile:  Recent Labs   Lab 04/15/24  1349   WBC 9.09   HGB 16.3*   HCT 50.4*      MCV 87   RDW 14.1        Chemistries:  Recent Labs   Lab 04/15/24  1349      K 4.2      CO2 26   BUN 32*   CREATININE 1.1   CALCIUM 10.1   ALBUMIN 3.7   PROT 7.9   BILITOT 0.7   ALKPHOS 59   ALT 27   AST 26   MG 1.8       All pertinent labs within the past 24 hours have been reviewed.    Significant Imaging:   I have reviewed all pertinent imaging results/findings within the past 24 hours.

## 2024-04-15 NOTE — PROGRESS NOTES
CHW - Initial Contact    This Community Health Worker updated and verified the Social Determinant of Health questionnaire with patient  at bedside  today.    Pt identified barriers of most importance are: has no needs at this time.   Referrals to community agencies completed with patient/caregiver consent outside of [facility] include: no: none at this time.  Referrals were put through [facility] - no: none at this time.  Support and Services: has no support at this time.  Other information discussed the patient needs / wants help with: Updated and verified SDOH with patient at bedside today, pt has no needs at this time. Will follow up in one week to check pt's future needs or possible case closure.    Follow up required: yes.  Follow-up Outreach - Due: 4/22/2024

## 2024-04-15 NOTE — ASSESSMENT & PLAN NOTE
Body mass index is 35.15 kg/m². Morbid obesity complicates all aspects of disease management from diagnostic modalities to treatment. Weight loss encouraged and health benefits explained to patient.

## 2024-04-15 NOTE — CLINICAL REVIEW
Sepsis Screen (most recent)       Sepsis Screen (IP) - 04/15/24 8445       Is the patient's history or complaint suggestive of a possible infection? Yes  -    Are there at least two of the following signs and symptoms present? Yes  -    Sepsis signs/symptoms - Tachycardia Tachycardia     >90  -    Sepsis signs/symptoms - Tachypnea Tachypnea     >20  -    Are any of the following organ dysfunction criteria present and not considered to be due to a chronic condition? Yes  -    Organ Dysfunction Criteria - Resp Comp Respiratory Compromise: Requiring > 5L NC  -    Organ Dysfunction Criteria - O2 O2 Saturation < 95% on room air  -    Initiate Sepsis Protocol No  -    Reason sepsis not considered Pt. receiving appropriate management  -              User Key  (r) = Recorded By, (t) = Taken By, (c) = Cosigned By      Initials Name    Lyudmila Rojo RN

## 2024-04-16 LAB
ANION GAP SERPL CALC-SCNC: 14 MMOL/L (ref 8–16)
AV INDEX (PROSTH): 0.32
AV MEAN GRADIENT: 28 MMHG
AV PEAK GRADIENT: 50 MMHG
AV REGURGITATION PRESSURE HALF TIME: 244.4 MS
AV VALVE AREA BY VELOCITY RATIO: 0.93 CM²
AV VALVE AREA: 0.96 CM²
AV VELOCITY RATIO: 0.31
BASOPHILS # BLD AUTO: 0.01 K/UL (ref 0–0.2)
BASOPHILS NFR BLD: 0.1 % (ref 0–1.9)
BSA FOR ECHO PROCEDURE: 1.9 M2
BUN SERPL-MCNC: 36 MG/DL (ref 8–23)
CALCIUM SERPL-MCNC: 9.1 MG/DL (ref 8.7–10.5)
CHLORIDE SERPL-SCNC: 97 MMOL/L (ref 95–110)
CO2 SERPL-SCNC: 27 MMOL/L (ref 23–29)
CREAT SERPL-MCNC: 1.2 MG/DL (ref 0.5–1.4)
CV ECHO LV RWT: 0.43 CM
DIFFERENTIAL METHOD BLD: ABNORMAL
DOP CALC AO PEAK VEL: 3.52 M/S
DOP CALC AO VTI: 75.1 CM
DOP CALC LVOT AREA: 3 CM2
DOP CALC LVOT DIAMETER: 1.96 CM
DOP CALC LVOT PEAK VEL: 1.08 M/S
DOP CALC LVOT STROKE VOLUME: 72.07 CM3
DOP CALCLVOT PEAK VEL VTI: 23.9 CM
E WAVE DECELERATION TIME: 287.48 MSEC
E/A RATIO: 0.68
E/E' RATIO: 11.69 M/S
ECHO LV POSTERIOR WALL: 0.99 CM (ref 0.6–1.1)
EOSINOPHIL # BLD AUTO: 0 K/UL (ref 0–0.5)
EOSINOPHIL NFR BLD: 0 % (ref 0–8)
ERYTHROCYTE [DISTWIDTH] IN BLOOD BY AUTOMATED COUNT: 14.1 % (ref 11.5–14.5)
EST. GFR  (NO RACE VARIABLE): 49 ML/MIN/1.73 M^2
FRACTIONAL SHORTENING: 40 % (ref 28–44)
GLUCOSE SERPL-MCNC: 137 MG/DL (ref 70–110)
HCT VFR BLD AUTO: 44.8 % (ref 37–48.5)
HGB BLD-MCNC: 14.5 G/DL (ref 12–16)
IMM GRANULOCYTES # BLD AUTO: 0.07 K/UL (ref 0–0.04)
IMM GRANULOCYTES NFR BLD AUTO: 0.7 % (ref 0–0.5)
INTERVENTRICULAR SEPTUM: 0.72 CM (ref 0.6–1.1)
IVC DIAMETER: 1.46 CM
LA MAJOR: 4.36 CM
LA MINOR: 4.75 CM
LA WIDTH: 4.1 CM
LEFT ATRIUM SIZE: 2.64 CM
LEFT ATRIUM VOLUME INDEX: 23 ML/M2
LEFT ATRIUM VOLUME: 41.83 CM3
LEFT INTERNAL DIMENSION IN SYSTOLE: 2.74 CM (ref 2.1–4)
LEFT VENTRICLE DIASTOLIC VOLUME INDEX: 52.68 ML/M2
LEFT VENTRICLE DIASTOLIC VOLUME: 95.87 ML
LEFT VENTRICLE MASS INDEX: 70 G/M2
LEFT VENTRICLE SYSTOLIC VOLUME INDEX: 15.3 ML/M2
LEFT VENTRICLE SYSTOLIC VOLUME: 27.9 ML
LEFT VENTRICULAR INTERNAL DIMENSION IN DIASTOLE: 4.57 CM (ref 3.5–6)
LEFT VENTRICULAR MASS: 127.25 G
LV LATERAL E/E' RATIO: 10.86 M/S
LV SEPTAL E/E' RATIO: 12.67 M/S
LVOT MG: 2.88 MMHG
LVOT MV: 0.83 CM/S
LYMPHOCYTES # BLD AUTO: 0.8 K/UL (ref 1–4.8)
LYMPHOCYTES NFR BLD: 7.8 % (ref 18–48)
MAGNESIUM SERPL-MCNC: 2.1 MG/DL (ref 1.6–2.6)
MCH RBC QN AUTO: 27.6 PG (ref 27–31)
MCHC RBC AUTO-ENTMCNC: 32.4 G/DL (ref 32–36)
MCV RBC AUTO: 85 FL (ref 82–98)
MONOCYTES # BLD AUTO: 0.6 K/UL (ref 0.3–1)
MONOCYTES NFR BLD: 6.1 % (ref 4–15)
MV PEAK A VEL: 1.11 M/S
MV PEAK E VEL: 0.76 M/S
MV STENOSIS PRESSURE HALF TIME: 83.37 MS
MV VALVE AREA P 1/2 METHOD: 2.64 CM2
NEUTROPHILS # BLD AUTO: 8.3 K/UL (ref 1.8–7.7)
NEUTROPHILS NFR BLD: 85.3 % (ref 38–73)
NRBC BLD-RTO: 0 /100 WBC
PHOSPHATE SERPL-MCNC: 4.1 MG/DL (ref 2.7–4.5)
PISA AR MAX VEL: 4.27 M/S
PISA TR MAX VEL: 1.96 M/S
PLATELET # BLD AUTO: 173 K/UL (ref 150–450)
PMV BLD AUTO: 11.5 FL (ref 9.2–12.9)
POTASSIUM SERPL-SCNC: 3.3 MMOL/L (ref 3.5–5.1)
RA MAJOR: 3.38 CM
RA PRESSURE ESTIMATED: 3 MMHG
RA WIDTH: 3 CM
RBC # BLD AUTO: 5.25 M/UL (ref 4–5.4)
RIGHT VENTRICULAR END-DIASTOLIC DIMENSION: 3.29 CM
RV TB RVSP: 5 MMHG
RV TISSUE DOPPLER FREE WALL SYSTOLIC VELOCITY 1 (APICAL 4 CHAMBER VIEW): 6.35 CM/S
SINUS: 3.08 CM
SODIUM SERPL-SCNC: 138 MMOL/L (ref 136–145)
STJ: 2.63 CM
TDI LATERAL: 0.07 M/S
TDI SEPTAL: 0.06 M/S
TDI: 0.07 M/S
TR MAX PG: 15 MMHG
TRICUSPID ANNULAR PLANE SYSTOLIC EXCURSION: 1.61 CM
TV REST PULMONARY ARTERY PRESSURE: 18 MMHG
WBC # BLD AUTO: 9.72 K/UL (ref 3.9–12.7)
Z-SCORE OF LEFT VENTRICULAR DIMENSION IN END DIASTOLE: -0.98
Z-SCORE OF LEFT VENTRICULAR DIMENSION IN END SYSTOLE: -1.01

## 2024-04-16 PROCEDURE — 99900035 HC TECH TIME PER 15 MIN (STAT)

## 2024-04-16 PROCEDURE — 85025 COMPLETE CBC W/AUTO DIFF WBC: CPT | Performed by: STUDENT IN AN ORGANIZED HEALTH CARE EDUCATION/TRAINING PROGRAM

## 2024-04-16 PROCEDURE — 36415 COLL VENOUS BLD VENIPUNCTURE: CPT | Performed by: STUDENT IN AN ORGANIZED HEALTH CARE EDUCATION/TRAINING PROGRAM

## 2024-04-16 PROCEDURE — 84100 ASSAY OF PHOSPHORUS: CPT | Performed by: STUDENT IN AN ORGANIZED HEALTH CARE EDUCATION/TRAINING PROGRAM

## 2024-04-16 PROCEDURE — 94660 CPAP INITIATION&MGMT: CPT

## 2024-04-16 PROCEDURE — 25000003 PHARM REV CODE 250: Performed by: STUDENT IN AN ORGANIZED HEALTH CARE EDUCATION/TRAINING PROGRAM

## 2024-04-16 PROCEDURE — 83735 ASSAY OF MAGNESIUM: CPT | Performed by: STUDENT IN AN ORGANIZED HEALTH CARE EDUCATION/TRAINING PROGRAM

## 2024-04-16 PROCEDURE — 11000001 HC ACUTE MED/SURG PRIVATE ROOM

## 2024-04-16 PROCEDURE — 94640 AIRWAY INHALATION TREATMENT: CPT

## 2024-04-16 PROCEDURE — 25000242 PHARM REV CODE 250 ALT 637 W/ HCPCS: Performed by: STUDENT IN AN ORGANIZED HEALTH CARE EDUCATION/TRAINING PROGRAM

## 2024-04-16 PROCEDURE — 63600175 PHARM REV CODE 636 W HCPCS: Performed by: HOSPITALIST

## 2024-04-16 PROCEDURE — 25000003 PHARM REV CODE 250: Performed by: HOSPITALIST

## 2024-04-16 PROCEDURE — 27000221 HC OXYGEN, UP TO 24 HOURS

## 2024-04-16 PROCEDURE — 63600175 PHARM REV CODE 636 W HCPCS: Performed by: STUDENT IN AN ORGANIZED HEALTH CARE EDUCATION/TRAINING PROGRAM

## 2024-04-16 PROCEDURE — 99291 CRITICAL CARE FIRST HOUR: CPT | Mod: ,,, | Performed by: INTERNAL MEDICINE

## 2024-04-16 PROCEDURE — 94761 N-INVAS EAR/PLS OXIMETRY MLT: CPT

## 2024-04-16 PROCEDURE — 99291 CRITICAL CARE FIRST HOUR: CPT | Mod: 25,,, | Performed by: INTERNAL MEDICINE

## 2024-04-16 PROCEDURE — 80048 BASIC METABOLIC PNL TOTAL CA: CPT | Performed by: STUDENT IN AN ORGANIZED HEALTH CARE EDUCATION/TRAINING PROGRAM

## 2024-04-16 RX ORDER — MUPIROCIN 20 MG/G
OINTMENT TOPICAL 2 TIMES DAILY
Status: DISCONTINUED | OUTPATIENT
Start: 2024-04-16 | End: 2024-04-19 | Stop reason: HOSPADM

## 2024-04-16 RX ORDER — MUPIROCIN 20 MG/G
OINTMENT TOPICAL 2 TIMES DAILY
Status: CANCELLED | OUTPATIENT
Start: 2024-04-16 | End: 2024-04-21

## 2024-04-16 RX ORDER — POTASSIUM CHLORIDE 20 MEQ/1
40 TABLET, EXTENDED RELEASE ORAL ONCE
Status: COMPLETED | OUTPATIENT
Start: 2024-04-16 | End: 2024-04-16

## 2024-04-16 RX ORDER — HYDRALAZINE HYDROCHLORIDE 20 MG/ML
10 INJECTION INTRAMUSCULAR; INTRAVENOUS EVERY 8 HOURS PRN
Status: DISCONTINUED | OUTPATIENT
Start: 2024-04-16 | End: 2024-04-19 | Stop reason: HOSPADM

## 2024-04-16 RX ORDER — LISINOPRIL 20 MG/1
20 TABLET ORAL DAILY
Status: DISCONTINUED | OUTPATIENT
Start: 2024-04-16 | End: 2024-04-19 | Stop reason: HOSPADM

## 2024-04-16 RX ADMIN — ATORVASTATIN CALCIUM 20 MG: 10 TABLET, FILM COATED ORAL at 09:04

## 2024-04-16 RX ADMIN — IPRATROPIUM BROMIDE AND ALBUTEROL SULFATE 3 ML: 2.5; .5 SOLUTION RESPIRATORY (INHALATION) at 07:04

## 2024-04-16 RX ADMIN — LISINOPRIL 20 MG: 20 TABLET ORAL at 10:04

## 2024-04-16 RX ADMIN — ASPIRIN 81 MG: 81 TABLET, COATED ORAL at 08:04

## 2024-04-16 RX ADMIN — METHYLPREDNISOLONE SODIUM SUCCINATE 40 MG: 125 INJECTION, POWDER, FOR SOLUTION INTRAMUSCULAR; INTRAVENOUS at 08:04

## 2024-04-16 RX ADMIN — IPRATROPIUM BROMIDE AND ALBUTEROL SULFATE 3 ML: 2.5; .5 SOLUTION RESPIRATORY (INHALATION) at 08:04

## 2024-04-16 RX ADMIN — HYDRALAZINE HYDROCHLORIDE 10 MG: 20 INJECTION INTRAMUSCULAR; INTRAVENOUS at 10:04

## 2024-04-16 RX ADMIN — MUPIROCIN: 20 OINTMENT TOPICAL at 10:04

## 2024-04-16 RX ADMIN — MUPIROCIN: 20 OINTMENT TOPICAL at 09:04

## 2024-04-16 RX ADMIN — POTASSIUM CHLORIDE 40 MEQ: 1500 TABLET, EXTENDED RELEASE ORAL at 09:04

## 2024-04-16 RX ADMIN — MICONAZOLE NITRATE: 20 POWDER TOPICAL at 08:04

## 2024-04-16 RX ADMIN — ENOXAPARIN SODIUM 40 MG: 40 INJECTION SUBCUTANEOUS at 05:04

## 2024-04-16 RX ADMIN — CEFTRIAXONE 2 G: 2 INJECTION, POWDER, FOR SOLUTION INTRAMUSCULAR; INTRAVENOUS at 11:04

## 2024-04-16 RX ADMIN — AZITHROMYCIN MONOHYDRATE 500 MG: 500 INJECTION, POWDER, LYOPHILIZED, FOR SOLUTION INTRAVENOUS at 05:04

## 2024-04-16 RX ADMIN — IPRATROPIUM BROMIDE AND ALBUTEROL SULFATE 3 ML: 2.5; .5 SOLUTION RESPIRATORY (INHALATION) at 03:04

## 2024-04-16 RX ADMIN — MICONAZOLE NITRATE: 20 POWDER TOPICAL at 09:04

## 2024-04-16 NOTE — ASSESSMENT & PLAN NOTE
Body mass index is 36.6 kg/m². Morbid obesity complicates all aspects of disease management from diagnostic modalities to treatment. Weight loss encouraged and health benefits explained to patient.        [facility]  [facility] s[facility] Ayanna Vital NP  Endocrinology  [facility]

## 2024-04-16 NOTE — PLAN OF CARE
Problem: Adult Inpatient Plan of Care  Goal: Absence of Hospital-Acquired Illness or Injury  Outcome: Ongoing, Progressing     Problem: Adult Inpatient Plan of Care  Goal: Optimal Comfort and Wellbeing  Outcome: Ongoing, Progressing     Problem: Adjustment to Illness COPD (Chronic Obstructive Pulmonary Disease)  Goal: Optimal Chronic Illness Coping  Outcome: Ongoing, Progressing

## 2024-04-16 NOTE — ASSESSMENT & PLAN NOTE
BNP elevated >500 -- check formal TTE though recent stress test noted EF 61% post stress and read as normal  - fluid sparing at this time

## 2024-04-16 NOTE — SUBJECTIVE & OBJECTIVE
Past Medical History:   Diagnosis Date    Asthma     Emphysema of lung     Hypertension        Past Surgical History:   Procedure Laterality Date     SECTION      CHOLECYSTECTOMY      DIRECT LARYNGOSCOPY N/A 10/03/2023    Procedure: LARYNGOSCOPY, DIRECT;  Surgeon: Lyudmila Barrera MD;  Location: [facility] OR;  Service: ENT;  Laterality: N/A;    EXCISION OF PAROTID GLAND Right 10/03/2023    Procedure: EXCISION, PAROTID GLAND;  Surgeon: Lyudmila Barrera MD;  Location: [facility] OR;  Service: ENT;  Laterality: Right;  NEUROMONITORING CAITY ANGELO [phone]    RN PREOP 2023---CONSENTS INCOMPLETE    EYE SURGERY      Cataract Surgery    HYSTERECTOMY      CAPRICE    TONSILLECTOMY      TUBAL LIGATION         Review of patient's allergies indicates:  No Known Allergies    Current Facility-Administered Medications   Medication Dose Route Frequency Provider Last Rate Last Admin    acetaminophen tablet 650 mg  650 mg Oral Q8H PRN Stephen Thakur MD        albuterol-ipratropium 2.5 mg-0.5 mg/3 mL nebulizer solution 3 mL  3 mL Nebulization Q6H WAKE Stephen Thakur MD   3 mL at 24 0731    aspirin EC tablet 81 mg  81 mg Oral Daily Stephen Thakur MD   81 mg at 24 0851    atorvastatin tablet 20 mg  20 mg Oral QHS Stephen Thakur MD        azithromycin (ZITHROMAX) 500 mg in dextrose 5 % (D5W) 250 mL IVPB (Vial-Mate)  500 mg Intravenous Q24H Stephen Thakur MD   Stopped at 04/15/24 1827    cefTRIAXone (ROCEPHIN) 2 g in dextrose 5 % in water (D5W) 100 mL IVPB (MB+)  2 g Intravenous Q24H Stephen Thakur MD   Stopped at 24 0028    enoxaparin injection 40 mg  40 mg Subcutaneous Daily Stephen Thakur MD   40 mg at 04/15/24 1724    hydrALAZINE injection 10 mg  10 mg Intravenous Q8H PRN Corky Tovar MD        lisinopriL tablet 20 mg  20 mg Oral Daily Corky Tovar MD        methylPREDNISolone sodium succinate injection 40 mg  40 mg Intravenous Daily Stephen Thakur MD   40 mg at  24 0851    miconazole NITRATE 2 % top powder   Topical (Top) BID Stephen Thakur MD   Given at 24 0851    mupirocin 2 % ointment   Nasal BID Corky Tovar MD        ondansetron injection 4 mg  4 mg Intravenous Q12H PRN Stephen Thakur MD        sodium chloride 0.9% flush 3 mL  3 mL Intravenous PRN Stephen Thakur MD         Family History       Problem Relation (Age of Onset)    Diabetes Sister, Brother    Hypertension Mother          Tobacco Use    Smoking status: Some Days     Current packs/day: 0.00     Average packs/day: 1 pack/day for 55.0 years (55.0 ttl pk-yrs)     Types: Cigarettes     Start date: 1968     Last attempt to quit: 3/5/2018     Years since quittin.1    Smokeless tobacco: Never   Substance and Sexual Activity    Alcohol use: No    Drug use: No    Sexual activity: Not Currently     Review of Systems   Constitutional: Negative for decreased appetite.   HENT:  Negative for ear discharge.    Eyes:  Negative for blurred vision.   Respiratory:  Negative for hemoptysis.    Endocrine: Negative for polyphagia.   Hematologic/Lymphatic: Negative for adenopathy.   Skin:  Negative for color change.   Musculoskeletal:  Negative for joint swelling.   Genitourinary:  Negative for bladder incontinence.   Neurological:  Negative for brief paralysis.   Psychiatric/Behavioral:  Negative for hallucinations.    Allergic/Immunologic: Negative for hives.     Objective:     Vital Signs (Most Recent):  Temp: 97.2 °F (36.2 °C) (24 0700)  Pulse: 93 (24 1000)  Resp: (!) 27 (24 1000)  BP: (!) 170/80 (24 1000)  SpO2: 96 % (24 1000) Vital Signs (24h Range):  Temp:  [97 °F (36.1 °C)-97.7 °F (36.5 °C)] 97.2 °F (36.2 °C)  Pulse:  [] 93  Resp:  [15-60] 27  SpO2:  [86 %-100 %] 96 %  BP: (108-179)/() 170/80     Weight: 85 kg (187 lb 6.3 oz)  Body mass index is 36.6 kg/m².    SpO2: 96 %         Intake/Output Summary (Last 24 hours) at 2024 1044  Last data filed  at 4/16/2024 0613  Gross per 24 hour   Intake 664.62 ml   Output 1100 ml   Net -435.38 ml       Lines/Drains/Airways       Drain  Duration                  Closed/Suction Drain 10/03/23 1230 Tube - 1 Right Neck Bulb 10 Fr. 195 days    Female External Urinary Catheter w/ Suction 04/15/24 1730 <1 day              Peripheral Intravenous Line  Duration                  Peripheral IV - Single Lumen 04/15/24 1347 20 G Left Forearm <1 day         Peripheral IV - Single Lumen 04/15/24 1700 20 G Anterior;Left;Proximal Forearm <1 day                     Physical Exam  Constitutional:       Appearance: She is well-developed.   HENT:      Head: Normocephalic and atraumatic.   Eyes:      Conjunctiva/sclera: Conjunctivae normal.      Pupils: Pupils are equal, round, and reactive to light.   Cardiovascular:      Rate and Rhythm: Normal rate.      Pulses: Intact distal pulses.      Heart sounds: Murmur heard.      Harsh midsystolic murmur is present with a grade of 2/6 at the upper right sternal border radiating to the neck.   Pulmonary:      Effort: Pulmonary effort is normal.      Breath sounds: Normal breath sounds.   Abdominal:      General: Bowel sounds are normal.      Palpations: Abdomen is soft.   Musculoskeletal:         General: Normal range of motion.      Cervical back: Normal range of motion and neck supple.   Skin:     General: Skin is warm and dry.   Neurological:      Mental Status: She is alert and oriented to person, place, and time.          Significant Labs: All pertinent lab results from the last 24 hours have been reviewed.    Significant Imaging: Echocardiogram: 2D echo with color flow doppler: No results found for this or any previous visit.

## 2024-04-16 NOTE — SUBJECTIVE & OBJECTIVE
Interval History:  Today the patient feels improved.  She is still wheezing, but is weaned from BIPAP with good results.  Now on 3LPM.  TTE done this AM and read pending.    Review of Systems   Constitutional:  Positive for activity change and fatigue. Negative for fever.   Respiratory:  Positive for cough, shortness of breath and wheezing. Negative for chest tightness.    Cardiovascular:  Negative for leg swelling.   Psychiatric/Behavioral:  Negative for agitation, behavioral problems and confusion.      Objective:     Vital Signs (Most Recent):  Temp: 97.2 °F (36.2 °C) (04/16/24 0700)  Pulse: 93 (04/16/24 1000)  Resp: (!) 27 (04/16/24 1000)  BP: (!) 170/80 (04/16/24 1000)  SpO2: 96 % (04/16/24 1000) Vital Signs (24h Range):  Temp:  [97 °F (36.1 °C)-97.7 °F (36.5 °C)] 97.2 °F (36.2 °C)  Pulse:  [] 93  Resp:  [15-60] 27  SpO2:  [86 %-100 %] 96 %  BP: (108-179)/() 170/80     Weight: 85 kg (187 lb 6.3 oz)  Body mass index is 36.6 kg/m².      Intake/Output Summary (Last 24 hours) at 4/16/2024 1025  Last data filed at 4/16/2024 0613  Gross per 24 hour   Intake 664.62 ml   Output 1100 ml   Net -435.38 ml        Physical Exam  Constitutional:       General: She is not in acute distress.     Appearance: Normal appearance. She is not ill-appearing or toxic-appearing.   HENT:      Head: Normocephalic and atraumatic.      Nose: Nose normal.      Mouth/Throat:      Mouth: Mucous membranes are moist.   Eyes:      General: No scleral icterus.     Extraocular Movements: Extraocular movements intact.      Conjunctiva/sclera: Conjunctivae normal.      Pupils: Pupils are equal, round, and reactive to light.   Cardiovascular:      Rate and Rhythm: Regular rhythm. Tachycardia present.      Heart sounds: No murmur heard.     No friction rub. No gallop.   Pulmonary:      Breath sounds: Wheezing present.      Comments: Mild increase in WOB, diffuse wheezing overall improved.  Prolonged expiratory phase.  Abdominal:       General: Abdomen is flat. There is no distension.      Palpations: Abdomen is soft.      Tenderness: There is no abdominal tenderness.   Musculoskeletal:         General: Normal range of motion.      Cervical back: Normal range of motion.   Skin:     General: Skin is warm.      Capillary Refill: Capillary refill takes less than 2 seconds.   Neurological:      General: No focal deficit present.      Mental Status: She is alert and oriented to person, place, and time. Mental status is at baseline.   Psychiatric:         Mood and Affect: Mood normal.         Behavior: Behavior normal.         Thought Content: Thought content normal.         Judgment: Judgment normal.          Vents:  Oxygen Concentration (%): 30 (04/16/24 0400)    Lines/Drains/Airways       Drain  Duration                  Closed/Suction Drain 10/03/23 1230 Tube - 1 Right Neck Bulb 10 Fr. 195 days    Female External Urinary Catheter w/ Suction 04/15/24 1730 <1 day              Peripheral Intravenous Line  Duration                  Peripheral IV - Single Lumen 04/15/24 1347 20 G Left Forearm <1 day         Peripheral IV - Single Lumen 04/15/24 1700 20 G Anterior;Left;Proximal Forearm <1 day                    Significant Labs:    CBC/Anemia Profile:  Recent Labs   Lab 04/15/24  1349 04/16/24  0350   WBC 9.09 9.72   HGB 16.3* 14.5   HCT 50.4* 44.8    173   MCV 87 85   RDW 14.1 14.1        Chemistries:  Recent Labs   Lab 04/15/24  1349 04/16/24  0349    138   K 4.2 3.3*    97   CO2 26 27   BUN 32* 36*   CREATININE 1.1 1.2   CALCIUM 10.1 9.1   ALBUMIN 3.7  --    PROT 7.9  --    BILITOT 0.7  --    ALKPHOS 59  --    ALT 27  --    AST 26  --    MG 1.8 2.1   PHOS  --  4.1       All pertinent labs within the past 24 hours have been reviewed.    Significant Imaging:   I have reviewed all pertinent imaging results/findings within the past 24 hours.

## 2024-04-16 NOTE — PROGRESS NOTES
[facility] Medicine  Progress Note    Patient Name: Chanel Esparza  MRN: [id number]  Patient Class: IP- Inpatient   Admission Date: 4/15/2024  Length of Stay: 1 days  Attending Physician: Corky Tovar MD  Primary Care Provider: Warner Richey MD        Subjective:     Principal Problem:Acute respiratory failure with hypoxia and hypercapnia        HPI:  Ms. Esparza is a 68-year-old female with past medical history of emphysema, asthma, hypertension who presents to the emergency department for worsening shortness breath.  Patient reports her difficulty breathing started last Tuesday and has slowly progressed.  She went for nuclear stress test on Friday which afterwards started to continue to worsen.  She was started on doxycycline and p.o. prednisone as an outpatient however this has not made any improvement.  She continues to wheeze and have difficulty with her breathing.  Reports having a dry cough but denies any fevers or chills.  No chest pain.  Discussed code status with her, she was very adamant she does not want to be resuscitated or put on breathing machine.  Her sister and daughter-in-law were present for this conversation.  Patient confirm DNR/DNI at this time.  She is currently on BiPAP continuous.  Pulmonology has been consulted.She will be admitted to ICU for further management.    Overview/Hospital Course:  67 y/o female admitted to ICU on Bipap with acute hypoxemic respiratory failure secondary to COPD exacerbation.  Pulmonary consulted.  Patient started on nebs, oxygen, IV steroids and ABX's.    Interval History: feeling better with decreased SOB.    Review of Systems   HENT:  Negative for ear discharge and ear pain.    Eyes:  Negative for discharge and itching.   Endocrine: Negative for cold intolerance and heat intolerance.   Neurological:  Negative for seizures and syncope.     Objective:     Vital Signs (Most Recent):  Temp: 97.2 °F (36.2 °C) (04/16/24 0700)  Pulse: 93  (04/16/24 1000)  Resp: (!) 27 (04/16/24 1000)  BP: (!) 170/80 (04/16/24 1000)  SpO2: 96 % (04/16/24 1000) Vital Signs (24h Range):  Temp:  [97 °F (36.1 °C)-97.7 °F (36.5 °C)] 97.2 °F (36.2 °C)  Pulse:  [] 93  Resp:  [15-60] 27  SpO2:  [86 %-100 %] 96 %  BP: (108-179)/() 170/80     Weight: 85 kg (187 lb 6.3 oz)  Body mass index is 36.6 kg/m².    Intake/Output Summary (Last 24 hours) at 4/16/2024 1022  Last data filed at 4/16/2024 0613  Gross per 24 hour   Intake 664.62 ml   Output 1100 ml   Net -435.38 ml         Physical Exam  Vitals and nursing note reviewed.   Constitutional:       Appearance: She is not toxic-appearing or diaphoretic.   Cardiovascular:      Rate and Rhythm: Normal rate and regular rhythm.      Pulses: Normal pulses.      Heart sounds: No murmur heard.  Pulmonary:      Effort: Pulmonary effort is normal.      Breath sounds: Wheezing (improved from yesterday) present.   Abdominal:      General: Bowel sounds are normal. There is no distension.   Musculoskeletal:         General: No swelling.   Skin:     General: Skin is warm.   Neurological:      General: No focal deficit present.      Mental Status: She is alert and oriented to person, place, and time.   Psychiatric:         Mood and Affect: Mood normal.         Thought Content: Thought content normal.             Significant Labs: All pertinent labs within the past 24 hours have been reviewed.  BMP:   Recent Labs   Lab 04/16/24  0349   *      K 3.3*   CL 97   CO2 27   BUN 36*   CREATININE 1.2   CALCIUM 9.1   MG 2.1     CBC:   Recent Labs   Lab 04/15/24  1349 04/16/24  0350   WBC 9.09 9.72   HGB 16.3* 14.5   HCT 50.4* 44.8    173       Significant Imaging: I have reviewed all pertinent imaging results/findings within the past 24 hours.    Assessment/Plan:      * Acute respiratory failure with hypoxia and hypercapnia  Patient with Hypercapnic and Hypoxic Respiratory failure which is Acute.  she is not on home oxygen.  Supplemental oxygen was provided and noted- Oxygen Concentration (%):  [30] 30.   Signs/symptoms of respiratory failure include- tachypnea, increased work of breathing, respiratory distress, use of accessory muscles, and wheezing. Contributing diagnoses includes - COPD Labs and images were reviewed. Patient Has recent ABG, which has been reviewed. Will treat underlying causes and adjust management of respiratory failure as follows-   - IV Solumedrol 40 mg daily  - Ceftriaxone and Azithromycin coverage  - placed on continuous bipap- she is very adamant about DNR/DNI  -check TTE  Weaned off Bipap to NC and doing well.  Continue current management.  Transfer to floor.    Advanced care planning/counseling discussion  Advance Care Planning    Date: 04/15/2024       I engaged Mrs. Esparza in a conversation regarding goals of care and code status.  Given her respiratory issues at this time, I discussed if she would want to be on a ventilator if needed.  She was very adamant that she did not want to be placed on a ventilator or coded for any type of arrest. She was very clear with her wishes and witnessed by her sister Eli and daughter in law at bedside. If she were not able to make decisions for herself, she would want her sister eli to be decision maker first then her son Will. Otherwise, we are continuing with aggressive care at this time. DNR/DNI was placed in chart.      Elevated brain natriuretic peptide (BNP) level  BNP elevated >500 -- check formal TTE though recent stress test noted EF 61% post stress and read as normal  - fluid sparing at this time      COPD exacerbation  Patient's COPD is with exacerbation noted by continued dyspnea, use of accessory muscles for breathing, and worsening of baseline hypoxia currently.  Patient is currently on COPD Pathway. Continue scheduled inhalers Steroids, Antibiotics, and Supplemental oxygen and monitor respiratory status closely.     Morbid obesity  Body mass index is 36.6  kg/m². Morbid obesity complicates all aspects of disease management from diagnostic modalities to treatment. Weight loss encouraged and health benefits explained to patient.           VTE Risk Mitigation (From admission, onward)           Ordered     enoxaparin injection 40 mg  Daily         04/15/24 1653     IP VTE HIGH RISK PATIENT  Once         04/15/24 1653     Place sequential compression device  Until discontinued         04/15/24 1653                    Discharge Planning   GE:      Code Status: DNR   Is the patient medically ready for discharge?:     Reason for patient still in hospital (select all that apply): Patient trending condition and Treatment             Corky Tovar MD  Department of Hospital Medicine   [facility] [place] Int[place] Care

## 2024-04-16 NOTE — PROGRESS NOTES
Vancomycin consult follow-up:    Patient reviewed, renal function stable, no new levels, continue current therapy; Next levels due: trough due 4/17/2024 at 1530

## 2024-04-16 NOTE — CONSULTS
[facility] - Intensive Care  Cardiology  Consult Note    Patient Name: Chanel Esparza  MRN: [id number]  Admission Date: 4/15/2024  Hospital Length of Stay: 1 days  Code Status: DNR   Attending Provider: Corky Tovar MD   Consulting Provider: Abhijeet Boogie MD  Primary Care Physician: Warner Richey MD  Principal Problem:Acute respiratory failure with hypoxia and hypercapnia    Patient information was obtained from patient and ER records.     Inpatient consult to Cardiology  Consult performed by: Abhijeet Boogie MD  Consult ordered by: Gwendolyn Chambers MD        Subjective:     Chief Complaint:  SOB, AS, CHF     HPI:     Ms. Esparza is a 68-year-old female with past medical history of emphysema, asthma, hypertension who presents to the emergency department for worsening shortness breath.  Patient reports her difficulty breathing started last Tuesday and has slowly progressed.  She went for nuclear stress test on Friday which afterwards started to continue to worsen.  She was started on doxycycline and p.o. prednisone as an outpatient however this has not made any improvement.  She continues to wheeze and have difficulty with her breathing.  Reports having a dry cough but denies any fevers or chills.  No chest pain.  Discussed code status with her, she was very adamant she does not want to be resuscitated or put on breathing machine.  Her sister and daughter-in-law were present for this conversation.  Patient confirm DNR/DNI at this time.  She is currently on BiPAP continuous.  Pulmonology has been consulted.She will be admitted to ICU for further management.     Overview/Hospital Course:  69 y/o female admitted to ICU on Bipap with acute hypoxemic respiratory failure secondary to COPD exacerbation.  Pulmonary consulted.  Patient started on nebs, oxygen, IV steroids and ABX's.    Less SOB, denies CP    Troponin negative  EKG sinus tachycardia RBBB    Echo 3/6/24    Left Ventricle: The left ventricle is  normal in size. Mildly increased wall thickness. There is normal systolic function with a visually estimated ejection fraction of 55 - 60%. Grade I diastolic dysfunction.    Right Ventricle: Normal right ventricular cavity size. Systolic function is normal.    Left Atrium: Left atrium is mildly dilated.    Right Atrium: Right atrium is mildly dilated.    Aortic Valve: There is moderate to severe stenosis. Aortic valve area by VTI is 0.98 cm². Aortic valve peak velocity is 3.14 m/s. Mean gradient is 24 mmHg. The dimensionless index is 0.30. There is mild aortic regurgitation.    Mitral Valve: There is mild to moderate regurgitation.    Tricuspid Valve: There is mild regurgitation.    IVC/SVC: Normal venous pressure at 3 mmHg.       Stress test 24    Normal myocardial perfusion scan. There is no evidence of myocardial ischemia or infarction.    There is a mild intensity perfusion abnormality in the anterior wall of the left ventricle, secondary to breast attenuation.    The gated perfusion images showed an ejection fraction of 61% post stress.    The ECG portion of the study is abnormal but not diagnostic due to resting ST-T abnormalities.    The patient reported no chest pain during the stress test.    There were no arrhythmias during stress.     Followed by Dr Alvarado  # WATTS, ?anginal sxs  # AS, mod-sev (echo 3/2024)  # HTN, controlled  # HLP, intol of atorva 40mg  # L car bruit vs transmitted murmur  # abnl EKG  # tob abuse, ongoing  # BMI 36    Past Medical History:   Diagnosis Date    Asthma     Emphysema of lung     Hypertension        Past Surgical History:   Procedure Laterality Date     SECTION      CHOLECYSTECTOMY      DIRECT LARYNGOSCOPY N/A 10/03/2023    Procedure: LARYNGOSCOPY, DIRECT;  Surgeon: Lyudmila Barrera MD;  Location: [facility];  Service: ENT;  Laterality: N/A;    EXCISION OF PAROTID GLAND Right 10/03/2023    Procedure: EXCISION, PAROTID GLAND;  Surgeon: Lyudmila Barrera MD;   Location: [facility] OR;  Service: ENT;  Laterality: Right;  NEUROMONITORING CAITY ANGELO [phone]    RN PREOP 9/26/2023---CONSENTS INCOMPLETE    EYE SURGERY  2022    Cataract Surgery    HYSTERECTOMY      CAPRICE    TONSILLECTOMY      TUBAL LIGATION  1973       Review of patient's allergies indicates:  No Known Allergies    Current Facility-Administered Medications   Medication Dose Route Frequency Provider Last Rate Last Admin    acetaminophen tablet 650 mg  650 mg Oral Q8H PRN Stephen Thakur MD        albuterol-ipratropium 2.5 mg-0.5 mg/3 mL nebulizer solution 3 mL  3 mL Nebulization Q6H WAKE Stephen Thakur MD   3 mL at 04/16/24 0731    aspirin EC tablet 81 mg  81 mg Oral Daily Stephen Thakur MD   81 mg at 04/16/24 0851    atorvastatin tablet 20 mg  20 mg Oral QHS Stephen Thakur MD        azithromycin (ZITHROMAX) 500 mg in dextrose 5 % (D5W) 250 mL IVPB (Vial-Mate)  500 mg Intravenous Q24H Stephen Thakur MD   Stopped at 04/15/24 1827    cefTRIAXone (ROCEPHIN) 2 g in dextrose 5 % in water (D5W) 100 mL IVPB (MB+)  2 g Intravenous Q24H Stephen Thakur MD   Stopped at 04/16/24 0028    enoxaparin injection 40 mg  40 mg Subcutaneous Daily Stephen Thakur MD   40 mg at 04/15/24 1724    hydrALAZINE injection 10 mg  10 mg Intravenous Q8H PRN Corky Tovar MD        lisinopriL tablet 20 mg  20 mg Oral Daily Corky Tovar MD        methylPREDNISolone sodium succinate injection 40 mg  40 mg Intravenous Daily Stephen Thakur MD   40 mg at 04/16/24 0851    miconazole NITRATE 2 % top powder   Topical (Top) BID Stephen Thakur MD   Given at 04/16/24 0851    mupirocin 2 % ointment   Nasal BID Corky Tovar MD        ondansetron injection 4 mg  4 mg Intravenous Q12H PRN Stephen Thakur MD        sodium chloride 0.9% flush 3 mL  3 mL Intravenous PRN Stephen Thakur MD         Family History       Problem Relation (Age of Onset)    Diabetes Sister, Brother    Hypertension Mother          Tobacco Use     Smoking status: Some Days     Current packs/day: 0.00     Average packs/day: 1 pack/day for 55.0 years (55.0 ttl pk-yrs)     Types: Cigarettes     Start date: 1968     Last attempt to quit: 3/5/2018     Years since quittin.1    Smokeless tobacco: Never   Substance and Sexual Activity    Alcohol use: No    Drug use: No    Sexual activity: Not Currently     Review of Systems   Constitutional: Negative for decreased appetite.   HENT:  Negative for ear discharge.    Eyes:  Negative for blurred vision.   Respiratory:  Negative for hemoptysis.    Endocrine: Negative for polyphagia.   Hematologic/Lymphatic: Negative for adenopathy.   Skin:  Negative for color change.   Musculoskeletal:  Negative for joint swelling.   Genitourinary:  Negative for bladder incontinence.   Neurological:  Negative for brief paralysis.   Psychiatric/Behavioral:  Negative for hallucinations.    Allergic/Immunologic: Negative for hives.     Objective:     Vital Signs (Most Recent):  Temp: 97.2 °F (36.2 °C) (24 0700)  Pulse: 93 (24 1000)  Resp: (!) 27 (24 1000)  BP: (!) 170/80 (24 1000)  SpO2: 96 % (24 1000) Vital Signs (24h Range):  Temp:  [97 °F (36.1 °C)-97.7 °F (36.5 °C)] 97.2 °F (36.2 °C)  Pulse:  [] 93  Resp:  [15-60] 27  SpO2:  [86 %-100 %] 96 %  BP: (108-179)/() 170/80     Weight: 85 kg (187 lb 6.3 oz)  Body mass index is 36.6 kg/m².    SpO2: 96 %         Intake/Output Summary (Last 24 hours) at 2024 1044  Last data filed at 2024 0613  Gross per 24 hour   Intake 664.62 ml   Output 1100 ml   Net -435.38 ml       Lines/Drains/Airways       Drain  Duration                  Closed/Suction Drain 10/03/23 1230 Tube - 1 Right Neck Bulb 10 Fr. 195 days    Female External Urinary Catheter w/ Suction 04/15/24 1730 <1 day              Peripheral Intravenous Line  Duration                  Peripheral IV - Single Lumen 04/15/24 1347 20 G Left Forearm <1 day         Peripheral IV - Single Lumen  04/15/24 1700 20 G Anterior;Left;Proximal Forearm <1 day                     Physical Exam  Constitutional:       Appearance: She is well-developed.   HENT:      Head: Normocephalic and atraumatic.   Eyes:      Conjunctiva/sclera: Conjunctivae normal.      Pupils: Pupils are equal, round, and reactive to light.   Cardiovascular:      Rate and Rhythm: Normal rate.      Pulses: Intact distal pulses.      Heart sounds: Murmur heard.      Harsh midsystolic murmur is present with a grade of 2/6 at the upper right sternal border radiating to the neck.   Pulmonary:      Effort: Pulmonary effort is normal.      Breath sounds: Normal breath sounds.   Abdominal:      General: Bowel sounds are normal.      Palpations: Abdomen is soft.   Musculoskeletal:         General: Normal range of motion.      Cervical back: Normal range of motion and neck supple.   Skin:     General: Skin is warm and dry.   Neurological:      Mental Status: She is alert and oriented to person, place, and time.          Significant Labs: All pertinent lab results from the last 24 hours have been reviewed.    Significant Imaging: Echocardiogram: 2D echo with color flow doppler: No results found for this or any previous visit.  Assessment and Plan:     Elevated brain natriuretic peptide (BNP) level  . Hx moderate to severe AS with normal EF. Recent stress test negative for ischemia. Possible mild contribution from diastolic CHF. Repeat echo - if stable will f/u PRN. DNR noted    COPD exacerbation  Per primary        VTE Risk Mitigation (From admission, onward)           Ordered     enoxaparin injection 40 mg  Daily         04/15/24 1653     IP VTE HIGH RISK PATIENT  Once         04/15/24 1653     Place sequential compression device  Until discontinued         04/15/24 1653                  35 minutes spent with patient in ICU    Thank you for your consult. I will follow-up with patient. Please contact us if you have any additional  questions.    Abhijeet Boogie MD  Cardiology   [facility] - Intensive Care

## 2024-04-16 NOTE — CLINICAL REVIEW
IP Sepsis Screen (most recent)       Sepsis Screen (IP) - 04/16/24 1846       Is the patient's history or complaint suggestive of a possible infection? Yes  -    Are there at least two of the following signs and symptoms present? Yes  -JH    Sepsis signs/symptoms - Tachycardia Tachycardia     >90  -JH    Sepsis signs/symptoms - Tachypnea Tachypnea     >20  -JH    Are any of the following organ dysfunction criteria present and not considered to be due to a chronic condition? Yes  -    Organ Dysfunction Criteria Lactate > 2.0  -    Initiate Sepsis Protocol No  -JH    Reason sepsis not considered Pt. receiving appropriate management  -              User Key  (r) = Recorded By, (t) = Taken By, (c) = Cosigned By      Initials Name    Lyudmila Rojo RN

## 2024-04-16 NOTE — HPI
Ms. Esparza is a 68-year-old female with past medical history of emphysema, asthma, hypertension who presents to the emergency department for worsening shortness breath.  Patient reports her difficulty breathing started last Tuesday and has slowly progressed.  She went for nuclear stress test on Friday which afterwards started to continue to worsen.  She was started on doxycycline and p.o. prednisone as an outpatient however this has not made any improvement.  She continues to wheeze and have difficulty with her breathing.  Reports having a dry cough but denies any fevers or chills.  No chest pain.  Discussed code status with her, she was very adamant she does not want to be resuscitated or put on breathing machine.  Her sister and daughter-in-law were present for this conversation.  Patient confirm DNR/DNI at this time.  She is currently on BiPAP continuous.  Pulmonology has been consulted.She will be admitted to ICU for further management.     Overview/Hospital Course:  69 y/o female admitted to ICU on Bipap with acute hypoxemic respiratory failure secondary to COPD exacerbation.  Pulmonary consulted.  Patient started on nebs, oxygen, IV steroids and ABX's.    Less SOB, denies CP    Troponin negative  EKG sinus tachycardia RBBB    Echo 3/6/24    Left Ventricle: The left ventricle is normal in size. Mildly increased wall thickness. There is normal systolic function with a visually estimated ejection fraction of 55 - 60%. Grade I diastolic dysfunction.    Right Ventricle: Normal right ventricular cavity size. Systolic function is normal.    Left Atrium: Left atrium is mildly dilated.    Right Atrium: Right atrium is mildly dilated.    Aortic Valve: There is moderate to severe stenosis. Aortic valve area by VTI is 0.98 cm². Aortic valve peak velocity is 3.14 m/s. Mean gradient is 24 mmHg. The dimensionless index is 0.30. There is mild aortic regurgitation.    Mitral Valve: There is mild to moderate  regurgitation.    Tricuspid Valve: There is mild regurgitation.    IVC/SVC: Normal venous pressure at 3 mmHg.       Stress test 4/12/24    Normal myocardial perfusion scan. There is no evidence of myocardial ischemia or infarction.    There is a mild intensity perfusion abnormality in the anterior wall of the left ventricle, secondary to breast attenuation.    The gated perfusion images showed an ejection fraction of 61% post stress.    The ECG portion of the study is abnormal but not diagnostic due to resting ST-T abnormalities.    The patient reported no chest pain during the stress test.    There were no arrhythmias during stress.     Followed by Dr Alvarado  # WATTS, ?anginal sxs  # AS, mod-sev (echo 3/2024)  # HTN, controlled  # HLP, intol of atorva 40mg  # L car bruit vs transmitted murmur  # abnl EKG  # tob abuse, ongoing  # BMI 36

## 2024-04-16 NOTE — PROGRESS NOTES
[facility] - [place] Intensive Care  Critical Care Medicine  Progress Note    Patient Name: Chanel Esparza  MRN: [id number]  Admission Date: 4/15/2024  Hospital Length of Stay: 1 days  Code Status: DNR  Attending Provider: Corky Tovar MD  Primary Care Provider: [place] Richey MD   Principal Problem: Acute respiratory failure with hypoxia and hypercapnia    Subjective:     HPI:  Chanel Esparza has a past medical history that includes HTN, COPD/Asthma, glaucoma, morbid obesity, parotid gland nodule, and chronic tobacco abuse who presents today via EMS for shortness of breath x 1 week.  She describes wheezing, cough and chest tightness.  She tried to solve this at home with breathing treatments to no avail.  She arrived saturating 92% on room air.  She was given doxycycline and prednisone by PCP on 4/12, but symptoms persisted.      In the ER she was treated with nebulizer therapy, magnesium infusion, and 125 solumedrol injection.  Her initial VBG showed 7.35/53/41/29.  Her repeat VBG showed 7.29/61/28/30 and she was placed on BIPAP.  Despite her worsening blood gas, she states she feels subjectively better since arrival to ER.  She is tolerating BIPAP and appears comfortable at this time.    She denies sick contacts at this time.    Tobacco/vape: 1 PPD x 60 years  Occupation: no occupation exposures  Prior lung disease: COPD/asthma  Pets: 2 cats  Respiratory regimen: trelegy, and PRN albuterol  Prior hospitalization/intubation: went to urgent care about 2 years ago, none since.  Never intubated.  Snoring/apnea: does not have CPAP at home        Hospital/ICU Course:  Weaned from BIPAP overnight of admission with improvement in symptoms.  Now on 3LPM via NC.      Interval History:  Today the patient feels improved.  She is still wheezing, but is weaned from BIPAP with good results.  Now on 3LPM.  TTE done this AM and read pending.    Review of Systems   Constitutional:  Positive for activity change and fatigue. Negative for  fever.   Respiratory:  Positive for cough, shortness of breath and wheezing. Negative for chest tightness.    Cardiovascular:  Negative for leg swelling.   Psychiatric/Behavioral:  Negative for agitation, behavioral problems and confusion.      Objective:     Vital Signs (Most Recent):  Temp: 97.2 °F (36.2 °C) (04/16/24 0700)  Pulse: 93 (04/16/24 1000)  Resp: (!) 27 (04/16/24 1000)  BP: (!) 170/80 (04/16/24 1000)  SpO2: 96 % (04/16/24 1000) Vital Signs (24h Range):  Temp:  [97 °F (36.1 °C)-97.7 °F (36.5 °C)] 97.2 °F (36.2 °C)  Pulse:  [] 93  Resp:  [15-60] 27  SpO2:  [86 %-100 %] 96 %  BP: (108-179)/() 170/80     Weight: 85 kg (187 lb 6.3 oz)  Body mass index is 36.6 kg/m².      Intake/Output Summary (Last 24 hours) at 4/16/2024 1025  Last data filed at 4/16/2024 0613  Gross per 24 hour   Intake 664.62 ml   Output 1100 ml   Net -435.38 ml        Physical Exam  Constitutional:       General: She is not in acute distress.     Appearance: Normal appearance. She is not ill-appearing or toxic-appearing.   HENT:      Head: Normocephalic and atraumatic.      Nose: Nose normal.      Mouth/Throat:      Mouth: Mucous membranes are moist.   Eyes:      General: No scleral icterus.     Extraocular Movements: Extraocular movements intact.      Conjunctiva/sclera: Conjunctivae normal.      Pupils: Pupils are equal, round, and reactive to light.   Cardiovascular:      Rate and Rhythm: Regular rhythm. Tachycardia present.      Heart sounds: No murmur heard.     No friction rub. No gallop.   Pulmonary:      Breath sounds: Wheezing present.      Comments: Mild increase in WOB, diffuse wheezing overall improved.  Prolonged expiratory phase.  Abdominal:      General: Abdomen is flat. There is no distension.      Palpations: Abdomen is soft.      Tenderness: There is no abdominal tenderness.   Musculoskeletal:         General: Normal range of motion.      Cervical back: Normal range of motion.   Skin:     General: Skin is  warm.      Capillary Refill: Capillary refill takes less than 2 seconds.   Neurological:      General: No focal deficit present.      Mental Status: She is alert and oriented to person, place, and time. Mental status is at baseline.   Psychiatric:         Mood and Affect: Mood normal.         Behavior: Behavior normal.         Thought Content: Thought content normal.         Judgment: Judgment normal.          Vents:  Oxygen Concentration (%): 30 (04/16/24 0400)    Lines/Drains/Airways       Drain  Duration                  Closed/Suction Drain 10/03/23 1230 Tube - 1 Right Neck Bulb 10 Fr. 195 days    Female External Urinary Catheter w/ Suction 04/15/24 1730 <1 day              Peripheral Intravenous Line  Duration                  Peripheral IV - Single Lumen 04/15/24 1347 20 G Left Forearm <1 day         Peripheral IV - Single Lumen 04/15/24 1700 20 G Anterior;Left;Proximal Forearm <1 day                    Significant Labs:    CBC/Anemia Profile:  Recent Labs   Lab 04/15/24  1349 04/16/24  0350   WBC 9.09 9.72   HGB 16.3* 14.5   HCT 50.4* 44.8    173   MCV 87 85   RDW 14.1 14.1        Chemistries:  Recent Labs   Lab 04/15/24  1349 04/16/24  0349    138   K 4.2 3.3*    97   CO2 26 27   BUN 32* 36*   CREATININE 1.1 1.2   CALCIUM 10.1 9.1   ALBUMIN 3.7  --    PROT 7.9  --    BILITOT 0.7  --    ALKPHOS 59  --    ALT 27  --    AST 26  --    MG 1.8 2.1   PHOS  --  4.1       All pertinent labs within the past 24 hours have been reviewed.    Significant Imaging:   I have reviewed all pertinent imaging results/findings within the past 24 hours.    ABG  Recent Labs   Lab 04/15/24  1700   PH 7.326*   PO2 53   PCO2 52.1*   HCO3 27.2   BE 0     Assessment/Plan:     Pulmonary  * Acute respiratory failure with hypoxia and hypercapnia  - see COPD exacerbation section.    COPD exacerbation  Patient with known COPD/Asthma and is treated with trelegy and PRN albuterol at home.  She denies recent hospitalizations,  but did have to visit an urgent care about 2 years ago for abx and steroids.  She smokes about 1PPD for the last 60 years.  She denies sick contacts at this time.  - continue BIPAP for increased WOB with target sats between 88-96%.  - broad spectrum abx started by ER.  De-escalated to rocephin/azithromycin for 5 days course (day 2/5).    - respiratory infection panel pending. Covid and flu negative.  - continue steroids.  Recommend 40mg IV solumedrol daily for now. Recommend 5 days course.  - scheduled q4hr duonebs. Once wheezing is improved, can change to PRN  - continue trelegy and PRN albuterol when recovered.  - s/p 1 dose magnesium in ER.    Cardiac/Vascular  Elevated brain natriuretic peptide (BNP) level  TTE ordered and pending read for elevated BNP.  Possible diuresis indicated.    Endocrine  Morbid obesity  Body mass index is 36.6 kg/m². Morbid obesity complicates all aspects of disease management from diagnostic modalities to treatment.            Critical Care Daily Checklist:    A: Awake: RASS Goal/Actual Goal:    Actual:     B: Spontaneous Breathing Trial Performed?     C: SAT & SBT Coordinated?  N/a                      D: Delirium: CAM-ICU     E: Early Mobility Performed? Yes   F: Feeding Goal:    Status:     Current Diet Order   Procedures    Diet Cardiac      AS: Analgesia/Sedation N/a   T: Thromboembolic Prophylaxis lovenox   H: HOB > 300 Yes   U: Stress Ulcer Prophylaxis (if needed) N/a   G: Glucose Control PRN   B: Bowel Function     I: Indwelling Catheter (Lines & Fink) Necessity PIV   D: De-escalation of Antimicrobials/Pharmacotherapies As above    Plan for the day/ETD Likely step down    Code Status:  Family/Goals of Care: DNR  recovery     Critical Care Time: 45 minutes  Critical secondary to Patient has a condition that poses threat to life and bodily function: COPD exacerbation with hypoxemic and hypercarbic respiratory failure requiring BIPAP therapy.      Critical care was time spent  personally by me on the following activities: development of treatment plan with patient or surrogate and bedside caregivers, discussions with consultants, evaluation of patient's response to treatment, examination of patient, ordering and performing treatments and interventions, ordering and review of laboratory studies, ordering and review of radiographic studies, pulse oximetry, re-evaluation of patient's condition. This critical care time did not overlap with that of any other provider or involve time for any procedures.     Pulmonary will sign off. Please re-consult with further questions should they arise.    Erik Castle MD  Critical Care Medicine  [facility] - Intensive Care

## 2024-04-16 NOTE — PROVIDER TRANSFER
Transfer Note    67 y/o female admitted to ICU on Bipap with acute hypoxemic respiratory failure secondary to COPD exacerbation. Pulmonary consulted. Patient started on nebs, oxygen, IV steroids and ABX's.  Able to wean off Bipap and currently doing well on NC.  Transfer out of ICU and continue current management.  Hopefully home in a couple of days.  Will need to check home O2 needs.

## 2024-04-16 NOTE — HOSPITAL COURSE
69 y/o female admitted to ICU on Bipap with acute hypoxemic respiratory failure secondary to COPD exacerba chest x-ray with no focal consolidation.  Few coarse interstitial markings.  Echocardiogram with severe aortic stenosis, EF 65-70%, grade 1 diastolic dysfunction.  BNP elevated.  Cardiology consulted-Recent stress test negative for ischemia. Possible mild contribution from diastolic CHF.  Patient started on nebs, oxygen, IV steroids and ABX's. Able to wean off Bipap and currently doing well on NC.  Transfer out of ICU on 04/16/24 and continue current management.  Pulmonary consulted-continue Rocephin/azithromycin for 5 day course, and steroids for 5 day course.  Able to wean off supplemental oxygen at rest.  However, on 6 minute walk test, patient oxygen saturation dropped to 84% on room air.  Requiring 1 L nasal cannula to recover up to 92%.  Patient had to be discharge home with home oxygen.  Of note, she states she has her sisters portable oxygen at home as well that she has been using as needed.  PT/OT consulted-recommend low intensity therapy.    Patient admits feeling significantly better compared to presentation on admission.  States her shortness a breath has improved.  Still has some mild wheezing, but that has also improved.  Cough present.  Denies any chest pain. Pt denies any fever, headaches, vision changes, palpitations, abdominal pain, nausea, vomiting, or any new weaknesses. Feels ready to go home. Patient's exam on discharge was as follow: Patient is alert and oriented, appears in no acute distress, heart with regular rate and rhythm, lungs with some expiratory wheezes but improved, also sound less tight on exam today, abdomen soft, and no new weaknesses or focal deficits seen. Bilateral lower extremities without any edema or calf tenderness.     Patient was counseled regarding any abnormal labs, differential diagnosis, treatment options, risk-benefit, lifestyle changes, prognosis, current  condition, and medications. Patient was interactive and attentive.  Patient's questions were answered in a respectful and timely manner. Patient was instructed to follow-up with PCP within 1 week and to continue taking medications as prescribed.  Instructed to also follow up with pulmonology. Also, extensively discussed the risks, benefits, and side effects of patient's medications. Discussed with patient about any medication changes. Patient verbalized understanding and agrees to treatment plan.  Patient is stable for discharge.  Patient has no other questions or concerns at this time.  ED precautions discussed with the patient.    Vital signs are stable. Ambulating without any difficulty. Tolerating p.o. intake without any nausea or vomiting. Afebrile for over 24 hours. Patient is in stable condition and has no questions or concerns. Patient will be discharge to home with home health and home oxygen once transportation secured . Prescriptions sent to pharmacy.  CM/SW to assist with discharge planning.     Vitals:    04/19/24 0719 04/19/24 1101 04/19/24 1112 04/19/24 1625   BP: (!) 140/72 127/86     BP Location:       Patient Position:       Pulse: 90 102 100 99   Resp: 18 19 18 18   Temp: 98.3 °F (36.8 °C) 97.9 °F (36.6 °C)     TempSrc:       SpO2: (!) 94% (!) 90% (!) 91% (!) 90%   Weight:       Height:

## 2024-04-16 NOTE — SUBJECTIVE & OBJECTIVE
Interval History: feeling better with decreased SOB.    Review of Systems   HENT:  Negative for ear discharge and ear pain.    Eyes:  Negative for discharge and itching.   Endocrine: Negative for cold intolerance and heat intolerance.   Neurological:  Negative for seizures and syncope.     Objective:     Vital Signs (Most Recent):  Temp: 97.2 °F (36.2 °C) (04/16/24 0700)  Pulse: 93 (04/16/24 1000)  Resp: (!) 27 (04/16/24 1000)  BP: (!) 170/80 (04/16/24 1000)  SpO2: 96 % (04/16/24 1000) Vital Signs (24h Range):  Temp:  [97 °F (36.1 °C)-97.7 °F (36.5 °C)] 97.2 °F (36.2 °C)  Pulse:  [] 93  Resp:  [15-60] 27  SpO2:  [86 %-100 %] 96 %  BP: (108-179)/() 170/80     Weight: 85 kg (187 lb 6.3 oz)  Body mass index is 36.6 kg/m².    Intake/Output Summary (Last 24 hours) at 4/16/2024 1022  Last data filed at 4/16/2024 0613  Gross per 24 hour   Intake 664.62 ml   Output 1100 ml   Net -435.38 ml         Physical Exam  Vitals and nursing note reviewed.   Constitutional:       Appearance: She is not toxic-appearing or diaphoretic.   Cardiovascular:      Rate and Rhythm: Normal rate and regular rhythm.      Pulses: Normal pulses.      Heart sounds: No murmur heard.  Pulmonary:      Effort: Pulmonary effort is normal.      Breath sounds: Wheezing (improved from yesterday) present.   Abdominal:      General: Bowel sounds are normal. There is no distension.   Musculoskeletal:         General: No swelling.   Skin:     General: Skin is warm.   Neurological:      General: No focal deficit present.      Mental Status: She is alert and oriented to person, place, and time.   Psychiatric:         Mood and Affect: Mood normal.         Thought Content: Thought content normal.             Significant Labs: All pertinent labs within the past 24 hours have been reviewed.  BMP:   Recent Labs   Lab 04/16/24  0349   *      K 3.3*   CL 97   CO2 27   BUN 36*   CREATININE 1.2   CALCIUM 9.1   MG 2.1     CBC:   Recent Labs   Lab  04/15/24  1349 04/16/24  0350   WBC 9.09 9.72   HGB 16.3* 14.5   HCT 50.4* 44.8    173       Significant Imaging: I have reviewed all pertinent imaging results/findings within the past 24 hours.

## 2024-04-16 NOTE — ASSESSMENT & PLAN NOTE
Patient with Hypercapnic and Hypoxic Respiratory failure which is Acute.  she is not on home oxygen. Supplemental oxygen was provided and noted- Oxygen Concentration (%):  [30] 30.   Signs/symptoms of respiratory failure include- tachypnea, increased work of breathing, respiratory distress, use of accessory muscles, and wheezing. Contributing diagnoses includes - COPD Labs and images were reviewed. Patient Has recent ABG, which has been reviewed. Will treat underlying causes and adjust management of respiratory failure as follows-   - IV Solumedrol 40 mg daily  - Ceftriaxone and Azithromycin coverage  - placed on continuous bipap- she is very adamant about DNR/DNI  -check TTE  Weaned off Bipap to NC and doing well.  Continue current management.  Transfer to floor.

## 2024-04-16 NOTE — ASSESSMENT & PLAN NOTE
Patient with known COPD/Asthma and is treated with trelegy and PRN albuterol at home.  She denies recent hospitalizations, but did have to visit an urgent care about 2 years ago for abx and steroids.  She smokes about 1PPD for the last 60 years.  She denies sick contacts at this time.  - continue BIPAP for increased WOB with target sats between 88-96%.  - broad spectrum abx started by ER.  De-escalated to rocephin/azithromycin for 5 days course (day 2/5).    - respiratory infection panel pending. Covid and flu negative.  - continue steroids.  Recommend 40mg IV solumedrol daily for now. Recommend 5 days course.  - scheduled q4hr duonebs. Once wheezing is improved, can change to PRN  - continue trelegy and PRN albuterol when recovered.  - s/p 1 dose magnesium in ER.

## 2024-04-16 NOTE — PLAN OF CARE
[place] - Intensive Care  Initial Discharge Assessment       Primary Care Provider: [place] Richey MD  Case Management Assessment     PCP: See above  Pharmacy: [facility] [address]    Patient Arrived From: home alone  Existing Help at Home: Eli phelps    Barriers to Discharge: none    Discharge Plan:    A. Home Health   B. Home      Would like post discharge meals from N.  Will need PCP f/u and additional appts as ordered by the discharging physician.      Admission Diagnosis: Shortness of breath [R06.02]  Dyspnea [R06.00]  Acute respiratory distress [R06.03]    Admission Date: 4/15/2024  Expected Discharge Date: 4/18/2024    Transition of Care Barriers: None    Payor: [facility] / Plan: [employer] [facility] SNP / Product Type: Medicare Advantage /     Extended Emergency Contact Information  Primary Emergency Contact: Eli Rush  Address: [address] [place] [address] [place]  Mobile Phone: [phone]  Relation: Sister  Secondary Emergency Contact: Garth Chu  Mobile Phone: [phone]  Relation: Son  Preferred language: English   needed? No    Discharge Plan A: Home Health  Discharge Plan B: Home      [id number] - [place], [place] - [address] [facility] [address] [address]  Phone: [phone] Fax: [phone]    [id number] [place], [place]  [facility] [facility] [place] [address] [address] [facility] [address]  Phone: [phone] Fax: [phone]      Initial Assessment (most recent)       Adult Discharge Assessment - 04/16/24 1645          Discharge Assessment    Assessment Type Discharge Planning Assessment     Confirmed/corrected address, phone number and insurance Yes     Confirmed Demographics Correct on Facesheet     Source of Information patient     When was your last doctors appointment? --   two weeks ago    Communicated GE with  patient/caregiver No     Reason For Admission COPD Exacerbation     People in Home alone     Facility Arrived From: Home alone     Do you expect to return to your current living situation? Yes     Do you have help at home or someone to help you manage your care at home? Yes     Who are your caregiver(s) and their phone number(s)? sister, Eli Rush,  [phone]     Prior to hospitilization cognitive status: Alert/Oriented     Current cognitive status: Alert/Oriented     Walking or Climbing Stairs Difficulty no     Dressing/Bathing Difficulty no     Equipment Currently Used at Home none     Readmission within 30 days? No     Patient currently being followed by outpatient case management? No     Do you currently have service(s) that help you manage your care at home? No     Do you take prescription medications? Yes     Do you have prescription coverage? Yes     Coverage Payor: [employer] [facility] [employer]     Do you have any problems affording any of your prescribed medications? No     Is the patient taking medications as prescribed? yes     Who is going to help you get home at discharge? sister     How do you get to doctors appointments? car, drives self     Are you on dialysis? No     Do you take coumadin? No     Discharge Plan A Home Health     Discharge Plan B Home     DME Needed Upon Discharge  none     Discharge Plan discussed with: Patient     Transition of Care Barriers None

## 2024-04-16 NOTE — ASSESSMENT & PLAN NOTE
Body mass index is 36.6 kg/m². Morbid obesity complicates all aspects of disease management from diagnostic modalities to treatment.

## 2024-04-16 NOTE — NURSING
[facility] [place]  Nurses Note -- 4 Eyes      4/16/2024       Skin assessed on: Q Shift      [x] No Pressure Injuries Present    [x]Prevention Measures Documented    [] Yes LDA  for Pressure Injury Previously documented     [] Yes New Pressure Injury Discovered   [] LDA for New Pressure Injury Added      Attending RN:  Kristal Marie RN     Second RN:  Jane

## 2024-04-16 NOTE — ASSESSMENT & PLAN NOTE
. Hx moderate to severe AS with normal EF. Recent stress test negative for ischemia. Possible mild contribution from diastolic CHF. Repeat echo - if stable will f/u PRN. DNR noted

## 2024-04-16 NOTE — PLAN OF CARE
Problem: Adult Inpatient Plan of Care  Goal: Plan of Care Review  Outcome: Ongoing, Progressing  Goal: Patient-Specific Goal (Individualized)  Outcome: Ongoing, Progressing  Goal: Absence of Hospital-Acquired Illness or Injury  Outcome: Ongoing, Progressing  Goal: Optimal Comfort and Wellbeing  Outcome: Ongoing, Progressing  Goal: Readiness for Transition of Care  Outcome: Ongoing, Progressing     Problem: Adjustment to Illness COPD (Chronic Obstructive Pulmonary Disease)  Goal: Optimal Chronic Illness Coping  Outcome: Ongoing, Progressing     Problem: Respiratory Compromise COPD (Chronic Obstructive Pulmonary Disease)  Goal: Effective Oxygenation and Ventilation  Outcome: Ongoing, Progressing

## 2024-04-17 PROBLEM — D72.829 LEUKOCYTOSIS: Status: ACTIVE | Noted: 2024-04-17

## 2024-04-17 PROBLEM — I35.0 SEVERE AORTIC STENOSIS: Status: ACTIVE | Noted: 2024-04-17

## 2024-04-17 PROBLEM — E87.6 HYPOKALEMIA: Status: ACTIVE | Noted: 2024-04-17

## 2024-04-17 LAB
ANION GAP SERPL CALC-SCNC: 12 MMOL/L (ref 8–16)
BASOPHILS # BLD AUTO: 0.03 K/UL (ref 0–0.2)
BASOPHILS NFR BLD: 0.2 % (ref 0–1.9)
BUN SERPL-MCNC: 42 MG/DL (ref 8–23)
CALCIUM SERPL-MCNC: 9.4 MG/DL (ref 8.7–10.5)
CHLORIDE SERPL-SCNC: 99 MMOL/L (ref 95–110)
CO2 SERPL-SCNC: 27 MMOL/L (ref 23–29)
CREAT SERPL-MCNC: 1.1 MG/DL (ref 0.5–1.4)
DIFFERENTIAL METHOD BLD: ABNORMAL
EOSINOPHIL # BLD AUTO: 0 K/UL (ref 0–0.5)
EOSINOPHIL NFR BLD: 0.1 % (ref 0–8)
ERYTHROCYTE [DISTWIDTH] IN BLOOD BY AUTOMATED COUNT: 14.2 % (ref 11.5–14.5)
EST. GFR  (NO RACE VARIABLE): 54 ML/MIN/1.73 M^2
GLUCOSE SERPL-MCNC: 89 MG/DL (ref 70–110)
HCT VFR BLD AUTO: 48 % (ref 37–48.5)
HGB BLD-MCNC: 15.5 G/DL (ref 12–16)
IMM GRANULOCYTES # BLD AUTO: 0.15 K/UL (ref 0–0.04)
IMM GRANULOCYTES NFR BLD AUTO: 0.9 % (ref 0–0.5)
LACTATE SERPL-SCNC: 1.3 MMOL/L (ref 0.5–2.2)
LYMPHOCYTES # BLD AUTO: 1.3 K/UL (ref 1–4.8)
LYMPHOCYTES NFR BLD: 7.4 % (ref 18–48)
MAGNESIUM SERPL-MCNC: 2.3 MG/DL (ref 1.6–2.6)
MCH RBC QN AUTO: 28.1 PG (ref 27–31)
MCHC RBC AUTO-ENTMCNC: 32.3 G/DL (ref 32–36)
MCV RBC AUTO: 87 FL (ref 82–98)
MONOCYTES # BLD AUTO: 1.6 K/UL (ref 0.3–1)
MONOCYTES NFR BLD: 8.9 % (ref 4–15)
NEUTROPHILS # BLD AUTO: 14.6 K/UL (ref 1.8–7.7)
NEUTROPHILS NFR BLD: 82.5 % (ref 38–73)
NRBC BLD-RTO: 0 /100 WBC
PHOSPHATE SERPL-MCNC: 3.4 MG/DL (ref 2.7–4.5)
PLATELET # BLD AUTO: 214 K/UL (ref 150–450)
PMV BLD AUTO: 12.3 FL (ref 9.2–12.9)
POTASSIUM SERPL-SCNC: 4 MMOL/L (ref 3.5–5.1)
RBC # BLD AUTO: 5.52 M/UL (ref 4–5.4)
SODIUM SERPL-SCNC: 138 MMOL/L (ref 136–145)
WBC # BLD AUTO: 17.6 K/UL (ref 3.9–12.7)

## 2024-04-17 PROCEDURE — 85025 COMPLETE CBC W/AUTO DIFF WBC: CPT | Performed by: STUDENT IN AN ORGANIZED HEALTH CARE EDUCATION/TRAINING PROGRAM

## 2024-04-17 PROCEDURE — 25000003 PHARM REV CODE 250: Performed by: HOSPITALIST

## 2024-04-17 PROCEDURE — 94660 CPAP INITIATION&MGMT: CPT

## 2024-04-17 PROCEDURE — 36415 COLL VENOUS BLD VENIPUNCTURE: CPT | Performed by: STUDENT IN AN ORGANIZED HEALTH CARE EDUCATION/TRAINING PROGRAM

## 2024-04-17 PROCEDURE — 83735 ASSAY OF MAGNESIUM: CPT | Performed by: STUDENT IN AN ORGANIZED HEALTH CARE EDUCATION/TRAINING PROGRAM

## 2024-04-17 PROCEDURE — 94640 AIRWAY INHALATION TREATMENT: CPT

## 2024-04-17 PROCEDURE — 94761 N-INVAS EAR/PLS OXIMETRY MLT: CPT

## 2024-04-17 PROCEDURE — 25000242 PHARM REV CODE 250 ALT 637 W/ HCPCS: Performed by: HOSPITALIST

## 2024-04-17 PROCEDURE — 80048 BASIC METABOLIC PNL TOTAL CA: CPT | Performed by: STUDENT IN AN ORGANIZED HEALTH CARE EDUCATION/TRAINING PROGRAM

## 2024-04-17 PROCEDURE — 25000242 PHARM REV CODE 250 ALT 637 W/ HCPCS: Performed by: STUDENT IN AN ORGANIZED HEALTH CARE EDUCATION/TRAINING PROGRAM

## 2024-04-17 PROCEDURE — 63600175 PHARM REV CODE 636 W HCPCS: Performed by: HOSPITALIST

## 2024-04-17 PROCEDURE — 25000242 PHARM REV CODE 250 ALT 637 W/ HCPCS

## 2024-04-17 PROCEDURE — 84100 ASSAY OF PHOSPHORUS: CPT | Performed by: STUDENT IN AN ORGANIZED HEALTH CARE EDUCATION/TRAINING PROGRAM

## 2024-04-17 PROCEDURE — 25000003 PHARM REV CODE 250

## 2024-04-17 PROCEDURE — 83605 ASSAY OF LACTIC ACID: CPT | Performed by: STUDENT IN AN ORGANIZED HEALTH CARE EDUCATION/TRAINING PROGRAM

## 2024-04-17 PROCEDURE — 99900035 HC TECH TIME PER 15 MIN (STAT)

## 2024-04-17 PROCEDURE — 11000001 HC ACUTE MED/SURG PRIVATE ROOM

## 2024-04-17 PROCEDURE — 27000221 HC OXYGEN, UP TO 24 HOURS

## 2024-04-17 RX ORDER — IPRATROPIUM BROMIDE AND ALBUTEROL SULFATE 2.5; .5 MG/3ML; MG/3ML
3 SOLUTION RESPIRATORY (INHALATION)
Status: DISCONTINUED | OUTPATIENT
Start: 2024-04-17 | End: 2024-04-19 | Stop reason: HOSPADM

## 2024-04-17 RX ORDER — GUAIFENESIN 600 MG/1
600 TABLET, EXTENDED RELEASE ORAL ONCE
Status: COMPLETED | OUTPATIENT
Start: 2024-04-17 | End: 2024-04-17

## 2024-04-17 RX ORDER — ACETAMINOPHEN 325 MG/1
650 TABLET ORAL EVERY 4 HOURS PRN
Status: DISCONTINUED | OUTPATIENT
Start: 2024-04-17 | End: 2024-04-19 | Stop reason: HOSPADM

## 2024-04-17 RX ORDER — IPRATROPIUM BROMIDE AND ALBUTEROL SULFATE 2.5; .5 MG/3ML; MG/3ML
3 SOLUTION RESPIRATORY (INHALATION) ONCE
Status: COMPLETED | OUTPATIENT
Start: 2024-04-17 | End: 2024-04-17

## 2024-04-17 RX ADMIN — IPRATROPIUM BROMIDE AND ALBUTEROL SULFATE 3 ML: 2.5; .5 SOLUTION RESPIRATORY (INHALATION) at 12:04

## 2024-04-17 RX ADMIN — IPRATROPIUM BROMIDE AND ALBUTEROL SULFATE 3 ML: 2.5; .5 SOLUTION RESPIRATORY (INHALATION) at 08:04

## 2024-04-17 RX ADMIN — MICONAZOLE NITRATE: 20 POWDER TOPICAL at 09:04

## 2024-04-17 RX ADMIN — MUPIROCIN: 20 OINTMENT TOPICAL at 09:04

## 2024-04-17 RX ADMIN — AZITHROMYCIN MONOHYDRATE 500 MG: 500 INJECTION, POWDER, LYOPHILIZED, FOR SOLUTION INTRAVENOUS at 05:04

## 2024-04-17 RX ADMIN — ATORVASTATIN CALCIUM 20 MG: 10 TABLET, FILM COATED ORAL at 09:04

## 2024-04-17 RX ADMIN — LISINOPRIL 20 MG: 20 TABLET ORAL at 09:04

## 2024-04-17 RX ADMIN — ASPIRIN 81 MG: 81 TABLET, COATED ORAL at 09:04

## 2024-04-17 RX ADMIN — IPRATROPIUM BROMIDE AND ALBUTEROL SULFATE 3 ML: 2.5; .5 SOLUTION RESPIRATORY (INHALATION) at 04:04

## 2024-04-17 RX ADMIN — ENOXAPARIN SODIUM 40 MG: 40 INJECTION SUBCUTANEOUS at 04:04

## 2024-04-17 RX ADMIN — IPRATROPIUM BROMIDE AND ALBUTEROL SULFATE 3 ML: 2.5; .5 SOLUTION RESPIRATORY (INHALATION) at 01:04

## 2024-04-17 RX ADMIN — CEFTRIAXONE 2 G: 2 INJECTION, POWDER, FOR SOLUTION INTRAMUSCULAR; INTRAVENOUS at 11:04

## 2024-04-17 RX ADMIN — GUAIFENESIN 600 MG: 600 TABLET, EXTENDED RELEASE ORAL at 01:04

## 2024-04-17 RX ADMIN — METHYLPREDNISOLONE SODIUM SUCCINATE 40 MG: 125 INJECTION, POWDER, FOR SOLUTION INTRAMUSCULAR; INTRAVENOUS at 09:04

## 2024-04-17 NOTE — ASSESSMENT & PLAN NOTE
-echocardiogram for 04/16/2024 with moderate to severe aortic stenosis  -cardiology consulted  -needs outpatient follow-up

## 2024-04-17 NOTE — ASSESSMENT & PLAN NOTE
Advance Care Planning     Date: 04/15/2024       Dr. Tovar engaged Mrs. Esparza in a conversation regarding goals of care and code status.  Given her respiratory issues at this time, I discussed if she would want to be on a ventilator if needed.  She was very adamant that she did not want to be placed on a ventilator or coded for any type of arrest. She was very clear with her wishes and witnessed by her sister Eli and daughter in law at bedside. If she were not able to make decisions for herself, she would want her sister eli to be decision maker first then her son Will. Otherwise, we are continuing with aggressive care at this time. DNR/DNI was placed in chart.    -discussion as above by Dr. Tovar

## 2024-04-17 NOTE — NURSING
[facility], [facility]  Nurses Note -- 4 Eyes      4/17/2024       Skin assessed on: Q Shift      [x] No Pressure Injuries Present    [x]Prevention Measures Documented    [] Yes LDA  for Pressure Injury Previously documented     [] Yes New Pressure Injury Discovered   [] LDA for New Pressure Injury Added      Attending RN:  Amy Elliott RN     Second RN:  THOMAS hSah

## 2024-04-17 NOTE — ASSESSMENT & PLAN NOTE
Patient with Hypercapnic and Hypoxic Respiratory failure which is Acute.  she is not on home oxygen. Supplemental oxygen was provided and noted- Oxygen Concentration (%):  [30] 30.   Signs/symptoms of respiratory failure include- tachypnea, increased work of breathing, respiratory distress, use of accessory muscles, and wheezing. Contributing diagnoses includes - COPD Labs and images were reviewed. Patient Has recent ABG, which has been reviewed. Will treat underlying causes and adjust management of respiratory failure as follows-     -presented with cough, shortness for breath, and hypoxia.  O2 sats of 86% on room air  -admitted to ICU for continuous BiPAP, able to wean off to nasal cannula.she is very adamant about DNR/DNI  -respiratory panel pending   -scheduled DuoNebs, and p.r.n. for wheezing and shortness a breath  -IV Solumedrol 40 mg daily, needs 5 day course   -Ceftriaxone and Azithromycin coverage, plan for 5 day course   -pulmonology consulted:  Continue antibiotics and steroids.   -check TTE: echocardiogram with severe aortic stenosis, EF 65-70%, grade 1 diastolic dysfunction.  -Transfer to floor on 04/16  -wean O2 as tolerated

## 2024-04-17 NOTE — NURSING
[facility], [facility]  Nurses Note -- 4 Eyes      4/17/2024       Skin assessed on: Admit      [x] No Pressure Injuries Present    []Prevention Measures Documented    [] Yes LDA  for Pressure Injury Previously documented     [] Yes New Pressure Injury Discovered   [] LDA for New Pressure Injury Added      Attending RN:  Pablo Levy RN     Second RN:  THOMAS Persaud

## 2024-04-17 NOTE — NURSING
Pt transferred from ICU, AAOx4, Vital signs stable, with dry cough and mild SOB, on 3L NC. No complaints of Nausea and Vomiting, dizziness nor pain. Wheezes on auscultation, Bed in low position, wheels locked, Side rails up. Call light within reach. Placed pt on purewick. Call RT for breathig treatment and cough medicine given. Plan of care ongoing.

## 2024-04-17 NOTE — SUBJECTIVE & OBJECTIVE
Interval History:  No acute overnight events.  Patient remained afebrile.  Admits feeling better today.  Still has some shortness a breath and wheezing, but significantly improved compared to yesterday's.  Admits cough, denies any congestion.  No chest pain    Review of Systems   Constitutional:  Positive for activity change and fatigue. Negative for fever.   Respiratory:  Positive for cough, shortness of breath and wheezing. Negative for chest tightness.    Cardiovascular:  Negative for chest pain and leg swelling.   Psychiatric/Behavioral:  Negative for agitation and confusion.      Objective:     Vital Signs (Most Recent):  Temp: 97.8 °F (36.6 °C) (04/17/24 1158)  Pulse: 90 (04/17/24 1203)  Resp: 19 (04/17/24 1203)  BP: (!) 168/78 (04/17/24 1158)  SpO2: (!) 93 % (04/17/24 1203) Vital Signs (24h Range):  Temp:  [97 °F (36.1 °C)-98.4 °F (36.9 °C)] 97.8 °F (36.6 °C)  Pulse:  [] 90  Resp:  [16-32] 19  SpO2:  [93 %-98 %] 93 %  BP: (126-187)/(58-96) 168/78     Weight: 85 kg (187 lb 6.3 oz)  Body mass index is 36.6 kg/m².    Intake/Output Summary (Last 24 hours) at 4/17/2024 1247  Last data filed at 4/17/2024 1000  Gross per 24 hour   Intake 1380 ml   Output 1300 ml   Net 80 ml         Physical Exam  Vitals and nursing note reviewed.   Constitutional:       General: She is not in acute distress.     Appearance: She is obese. She is not ill-appearing.   HENT:      Head: Normocephalic and atraumatic.      Mouth/Throat:      Mouth: Mucous membranes are moist.   Cardiovascular:      Rate and Rhythm: Normal rate and regular rhythm.   Pulmonary:      Breath sounds: Wheezing present.      Comments: On 3L NC. Mild increase in WOB, diffuse wheezing, but overall improved.  Prolonged expiratory phase.  Abdominal:      General: There is no distension.      Palpations: Abdomen is soft.      Tenderness: There is no abdominal tenderness.   Musculoskeletal:      Cervical back: Normal range of motion.      Right lower leg: No  edema.      Left lower leg: No edema.   Skin:     General: Skin is warm and dry.      Capillary Refill: Capillary refill takes less than 2 seconds.   Neurological:      General: No focal deficit present.      Mental Status: She is alert and oriented to person, place, and time.   Psychiatric:         Mood and Affect: Mood normal.         Behavior: Behavior normal.         Thought Content: Thought content normal.             Significant Labs: All pertinent labs within the past 24 hours have been reviewed.    Significant Imaging: I have reviewed all pertinent imaging results/findings within the past 24 hours.

## 2024-04-17 NOTE — PLAN OF CARE
Problem: Adult Inpatient Plan of Care  Goal: Absence of Hospital-Acquired Illness or Injury  Outcome: Ongoing, Progressing  Intervention: Identify and Manage Fall Risk  Flowsheets (Taken 4/17/2024 8337)  Safety Promotion/Fall Prevention:   assistive device/personal item within reach   nonskid shoes/socks when out of bed   side rails raised x 2   room near unit station

## 2024-04-17 NOTE — RESPIRATORY THERAPY
Called for aerosol treatment. Slight distress noted upon arrival with RR 32 BPM.. Aerosol treatment given and encouraged patient to wear bipap. Patient agree to wear bipap and will alternate to NC 3 LPM every few hours. Status improved once placed on bipap. Will continue to monitor.

## 2024-04-17 NOTE — ASSESSMENT & PLAN NOTE
BNP elevated >500 -- check formal TTE though recent stress test noted EF 61% post stress and read as normal  - fluid sparing at this time  -cardiology consulted

## 2024-04-17 NOTE — ASSESSMENT & PLAN NOTE
-patient without leukocytosis on admission, remained afebrile.  White blood count increased to 17 on 04/17  -likely due to steroids  -continue antibiotics as above  -we will continue to monitor

## 2024-04-17 NOTE — ASSESSMENT & PLAN NOTE
Patient's COPD is with exacerbation noted by continued dyspnea, use of accessory muscles for breathing, and worsening of baseline hypoxia currently.  Patient is currently on COPD Pathway. Continue scheduled inhalers Steroids, Antibiotics, and Supplemental oxygen and monitor respiratory status closely.     -continue antibiotics with Rocephin and azithromycin, plan for 5 day course   -continue steroids, plan for 5 day course   -wean O2 as tolerated  -we will need walk test prior to discharge

## 2024-04-17 NOTE — PLAN OF CARE
Problem: Adult Inpatient Plan of Care  Goal: Plan of Care Review  Outcome: Ongoing, Progressing  Goal: Patient-Specific Goal (Individualized)  Outcome: Ongoing, Progressing  Goal: Absence of Hospital-Acquired Illness or Injury  Outcome: Ongoing, Progressing  Goal: Optimal Comfort and Wellbeing  Outcome: Ongoing, Progressing  Goal: Readiness for Transition of Care  Outcome: Ongoing, Progressing     Problem: Adjustment to Illness COPD (Chronic Obstructive Pulmonary Disease)  Goal: Optimal Chronic Illness Coping  Outcome: Ongoing, Progressing  Intervention: Support and Optimize Psychosocial Response  Flowsheets (Taken 4/17/2024 1700)  Supportive Measures:   active listening utilized   positive reinforcement provided  Family/Support System Care: support provided     Problem: Functional Ability Impaired COPD (Chronic Obstructive Pulmonary Disease)  Goal: Optimal Level of Functional [place]  Outcome: Ongoing, Progressing     Problem: Respiratory Compromise COPD (Chronic Obstructive Pulmonary Disease)  Goal: Effective Oxygenation and Ventilation  Intervention: Promote Airway Secretion Clearance  Flowsheets (Taken 4/17/2024 1700)  Cough And Deep Breathing: done independently per patient  Activity Management: Ambulated in [place] [id number]

## 2024-04-17 NOTE — PROGRESS NOTES
[facility] Medicine  Progress Note    Patient Name: Chanel Esparza  MRN: [id number]  Patient Class: IP- Inpatient   Admission Date: 4/15/2024  Length of Stay: 2 days  Attending Physician: Ksenia Restrepo DO  Primary Care Provider: Warner Richey MD        Subjective:     Principal Problem:Acute respiratory failure with hypoxia and hypercapnia        HPI:  Ms. Esparza is a 68-year-old female with past medical history of emphysema, asthma, hypertension who presents to the emergency department for worsening shortness breath.  Patient reports her difficulty breathing started last Tuesday and has slowly progressed.  She went for nuclear stress test on Friday which afterwards started to continue to worsen.  She was started on doxycycline and p.o. prednisone as an outpatient however this has not made any improvement.  She continues to wheeze and have difficulty with her breathing.  Reports having a dry cough but denies any fevers or chills.  No chest pain.  Discussed code status with her, she was very adamant she does not want to be resuscitated or put on breathing machine.  Her sister and daughter-in-law were present for this conversation.  Patient confirm DNR/DNI at this time.  She is currently on BiPAP continuous.  Pulmonology has been consulted.She will be admitted to ICU for further management.    Overview/Hospital Course:  69 y/o female admitted to ICU on Bipap with acute hypoxemic respiratory failure secondary to COPD exacerba chest x-ray with no focal consolidation.  Few coarse interstitial markings.  Echocardiogram with severe aortic stenosis, EF 65-70%, grade 1 diastolic dysfunction.  BNP elevated.  Cardiology consulted-Recent stress test negative for ischemia. Possible mild contribution from diastolic CHF.  Patient started on nebs, oxygen, IV steroids and ABX's. Able to wean off Bipap and currently doing well on NC.  Transfer out of ICU on 04/16/24 and continue current management.  Pulmonary  consulted-continue Rocephin/azithromycin for 5 day course, and steroids for 5 day course.  May need home oxygen on discharge    Interval History:  No acute overnight events.  Patient remained afebrile.  Admits feeling better today.  Still has some shortness a breath and wheezing, but significantly improved compared to yesterday's.  Admits cough, denies any congestion.  No chest pain    Review of Systems   Constitutional:  Positive for activity change and fatigue. Negative for fever.   Respiratory:  Positive for cough, shortness of breath and wheezing. Negative for chest tightness.    Cardiovascular:  Negative for chest pain and leg swelling.   Psychiatric/Behavioral:  Negative for agitation and confusion.      Objective:     Vital Signs (Most Recent):  Temp: 97.8 °F (36.6 °C) (04/17/24 1158)  Pulse: 90 (04/17/24 1203)  Resp: 19 (04/17/24 1203)  BP: (!) 168/78 (04/17/24 1158)  SpO2: (!) 93 % (04/17/24 1203) Vital Signs (24h Range):  Temp:  [97 °F (36.1 °C)-98.4 °F (36.9 °C)] 97.8 °F (36.6 °C)  Pulse:  [] 90  Resp:  [16-32] 19  SpO2:  [93 %-98 %] 93 %  BP: (126-187)/(58-96) 168/78     Weight: 85 kg (187 lb 6.3 oz)  Body mass index is 36.6 kg/m².    Intake/Output Summary (Last 24 hours) at 4/17/2024 1247  Last data filed at 4/17/2024 1000  Gross per 24 hour   Intake 1380 ml   Output 1300 ml   Net 80 ml         Physical Exam  Vitals and nursing note reviewed.   Constitutional:       General: She is not in acute distress.     Appearance: She is obese. She is not ill-appearing.   HENT:      Head: Normocephalic and atraumatic.      Mouth/Throat:      Mouth: Mucous membranes are moist.   Cardiovascular:      Rate and Rhythm: Normal rate and regular rhythm.   Pulmonary:      Breath sounds: Wheezing present.      Comments: On 3L NC. Mild increase in WOB, diffuse wheezing, but overall improved.  Prolonged expiratory phase.  Abdominal:      General: There is no distension.      Palpations: Abdomen is soft.       Tenderness: There is no abdominal tenderness.   Musculoskeletal:      Cervical back: Normal range of motion.      Right lower leg: No edema.      Left lower leg: No edema.   Skin:     General: Skin is warm and dry.      Capillary Refill: Capillary refill takes less than 2 seconds.   Neurological:      General: No focal deficit present.      Mental Status: She is alert and oriented to person, place, and time.   Psychiatric:         Mood and Affect: Mood normal.         Behavior: Behavior normal.         Thought Content: Thought content normal.             Significant Labs: All pertinent labs within the past 24 hours have been reviewed.    Significant Imaging: I have reviewed all pertinent imaging results/findings within the past 24 hours.    Assessment/Plan:      * Acute respiratory failure with hypoxia and hypercapnia  Patient with Hypercapnic and Hypoxic Respiratory failure which is Acute.  she is not on home oxygen. Supplemental oxygen was provided and noted- Oxygen Concentration (%):  [30] 30.   Signs/symptoms of respiratory failure include- tachypnea, increased work of breathing, respiratory distress, use of accessory muscles, and wheezing. Contributing diagnoses includes - COPD Labs and images were reviewed. Patient Has recent ABG, which has been reviewed. Will treat underlying causes and adjust management of respiratory failure as follows-     -presented with cough, shortness for breath, and hypoxia.  O2 sats of 86% on room air  -admitted to ICU for continuous BiPAP, able to wean off to nasal cannula.she is very adamant about DNR/DNI  -respiratory panel pending   -scheduled DuoNebs, and p.r.n. for wheezing and shortness a breath  -IV Solumedrol 40 mg daily, needs 5 day course   -Ceftriaxone and Azithromycin coverage, plan for 5 day course   -pulmonology consulted:  Continue antibiotics and steroids.   -check TTE: echocardiogram with severe aortic stenosis, EF 65-70%, grade 1 diastolic dysfunction.  -Transfer  to floor on 04/16  -wean O2 as tolerated    COPD exacerbation  Patient's COPD is with exacerbation noted by continued dyspnea, use of accessory muscles for breathing, and worsening of baseline hypoxia currently.  Patient is currently on COPD Pathway. Continue scheduled inhalers Steroids, Antibiotics, and Supplemental oxygen and monitor respiratory status closely.     -continue antibiotics with Rocephin and azithromycin, plan for 5 day course   -continue steroids, plan for 5 day course   -wean O2 as tolerated  -we will need walk test prior to discharge    Severe aortic stenosis  -echocardiogram for 04/16/2024 with moderate to severe aortic stenosis  -cardiology consulted  -needs outpatient follow-up      Elevated brain natriuretic peptide (BNP) level  BNP elevated >500 -- check formal TTE though recent stress test noted EF 61% post stress and read as normal  - fluid sparing at this time  -cardiology consulted      Morbid obesity  Body mass index is 36.6 kg/m². Morbid obesity complicates all aspects of disease management from diagnostic modalities to treatment. Weight loss encouraged and health benefits explained to patient.         Hypokalemia  Patient has hypokalemia which is Acute and currently controlled. Most recent potassium levels reviewed-   Lab Results   Component Value Date    K 4.0 04/17/2024   . Will continue potassium replacement per protocol and recheck repeat levels after replacement completed.     -replace as needed    Leukocytosis  -patient without leukocytosis on admission, remained afebrile.  White blood count increased to 17 on 04/17  -likely due to steroids  -continue antibiotics as above  -we will continue to monitor     Advanced care planning/counseling discussion  Advance Care Planning    Date: 04/15/2024       Dr. Tovar engaged Mrs. Esparza in a conversation regarding goals of care and code status.  Given her respiratory issues at this time, I discussed if she would want to be on a ventilator if  needed.  She was very adamant that she did not want to be placed on a ventilator or coded for any type of arrest. She was very clear with her wishes and witnessed by her sister Eli and daughter in law at bedside. If she were not able to make decisions for herself, she would want her sister eli to be decision maker first then her son Will. Otherwise, we are continuing with aggressive care at this time. DNR/DNI was placed in chart.    -discussion as above by Dr. Tovar        VTE Risk Mitigation (From admission, onward)           Ordered     enoxaparin injection 40 mg  Daily         04/15/24 1653     IP VTE HIGH RISK PATIENT  Once         04/15/24 1653     Place sequential compression device  Until discontinued         04/15/24 1653                    Discharge Planning   GE: 4/18/2024     Code Status: DNR   Is the patient medically ready for discharge?:     Reason for patient still in hospital (select all that apply): Patient trending condition and Treatment  Discharge Plan A: Home Health                  Ksenia Restrepo DO  Department of Hospital Medicine   [facility] [place] - Med Surg

## 2024-04-17 NOTE — ASSESSMENT & PLAN NOTE
Body mass index is 36.6 kg/m². Morbid obesity complicates all aspects of disease management from diagnostic modalities to treatment. Weight loss encouraged and health benefits explained to patient.

## 2024-04-17 NOTE — ASSESSMENT & PLAN NOTE
Patient has hypokalemia which is Acute and currently controlled. Most recent potassium levels reviewed-   Lab Results   Component Value Date    K 4.0 04/17/2024   . Will continue potassium replacement per protocol and recheck repeat levels after replacement completed.     -replace as needed

## 2024-04-18 LAB
ANION GAP SERPL CALC-SCNC: 9 MMOL/L (ref 8–16)
BASOPHILS # BLD AUTO: 0.04 K/UL (ref 0–0.2)
BASOPHILS NFR BLD: 0.3 % (ref 0–1.9)
BUN SERPL-MCNC: 39 MG/DL (ref 8–23)
CALCIUM SERPL-MCNC: 9.6 MG/DL (ref 8.7–10.5)
CHLORIDE SERPL-SCNC: 98 MMOL/L (ref 95–110)
CO2 SERPL-SCNC: 31 MMOL/L (ref 23–29)
CREAT SERPL-MCNC: 1.1 MG/DL (ref 0.5–1.4)
DIFFERENTIAL METHOD BLD: ABNORMAL
ENTEROVIRUS/RHINOVIRUS: NOT DETECTED
EOSINOPHIL # BLD AUTO: 0 K/UL (ref 0–0.5)
EOSINOPHIL NFR BLD: 0.1 % (ref 0–8)
ERYTHROCYTE [DISTWIDTH] IN BLOOD BY AUTOMATED COUNT: 14.4 % (ref 11.5–14.5)
EST. GFR  (NO RACE VARIABLE): 54 ML/MIN/1.73 M^2
GLUCOSE SERPL-MCNC: 87 MG/DL (ref 70–110)
HCT VFR BLD AUTO: 48.3 % (ref 37–48.5)
HGB BLD-MCNC: 15.1 G/DL (ref 12–16)
HUMAN BOCAVIRUS: NOT DETECTED
HUMAN CORONAVIRUS, COMMON COLD VIRUS: NOT DETECTED
IMM GRANULOCYTES # BLD AUTO: 0.19 K/UL (ref 0–0.04)
IMM GRANULOCYTES NFR BLD AUTO: 1.5 % (ref 0–0.5)
INFLUENZA A - H1N1-09: NOT DETECTED
LEGIONELLA PNEUMOPHILA: NOT DETECTED
LYMPHOCYTES # BLD AUTO: 1.2 K/UL (ref 1–4.8)
LYMPHOCYTES NFR BLD: 9.5 % (ref 18–48)
MAGNESIUM SERPL-MCNC: 2.4 MG/DL (ref 1.6–2.6)
MCH RBC QN AUTO: 28.1 PG (ref 27–31)
MCHC RBC AUTO-ENTMCNC: 31.3 G/DL (ref 32–36)
MCV RBC AUTO: 90 FL (ref 82–98)
MONOCYTES # BLD AUTO: 1.2 K/UL (ref 0.3–1)
MONOCYTES NFR BLD: 9.6 % (ref 4–15)
MORAXELLA CATARRHALIS: NOT DETECTED
NEUTROPHILS # BLD AUTO: 9.8 K/UL (ref 1.8–7.7)
NEUTROPHILS NFR BLD: 79 % (ref 38–73)
NRBC BLD-RTO: 0 /100 WBC
PARAINFLUENZA: NOT DETECTED
PHOSPHATE SERPL-MCNC: 4.1 MG/DL (ref 2.7–4.5)
PLATELET # BLD AUTO: 189 K/UL (ref 150–450)
PMV BLD AUTO: 12 FL (ref 9.2–12.9)
POTASSIUM SERPL-SCNC: 4.9 MMOL/L (ref 3.5–5.1)
RBC # BLD AUTO: 5.38 M/UL (ref 4–5.4)
RVP - ADENOVIRUS: NOT DETECTED
RVP - HUMAN METAPNEUMOVIRUS (HMPV): NOT DETECTED
RVP - INFLUENZA A: NOT DETECTED
RVP - INFLUENZA B: NOT DETECTED
RVP - RESPIRATORY SYNCTIAL VIRUS (RSV) A: NOT DETECTED
RVP - RESPIRATORY VIRAL PANEL, SOURCE: NORMAL
SODIUM SERPL-SCNC: 138 MMOL/L (ref 136–145)
TEM - ACINETOBACTER BAUMANNII: NOT DETECTED
TEM - BORDETELLA PERTUSSIS: NOT DETECTED
TEM - CHLAMYDOPHILA PNEUMONIAE: NOT DETECTED
TEM - KLEBSIELLA PNEUMONIAE: NOT DETECTED
TEM - MRSA: NOT DETECTED
TEM - MYCOPLASMA PNEUMONIAE: NOT DETECTED
TEM - NEISSERIA MENINGITIDIS: NOT DETECTED
TEM - PANTON-VALENTINE: NOT DETECTED
TEM - PSEUDOMONAS AERUGINOSA: NOT DETECTED
TEM - STAPHYLOCOCCUS AUREUS: NOT DETECTED
TEM - STREPTOCOCCUS PNEUMONIAE: NOT DETECTED
TEM - STREPTOCOCCUS PYOGENES A: NOT DETECTED
TEM- HAEMOPHILUS INFLUENZAE B: NOT DETECTED
TEM- HAEMOPHILUS INFLUENZAE: NOT DETECTED
WBC # BLD AUTO: 12.47 K/UL (ref 3.9–12.7)

## 2024-04-18 PROCEDURE — 94660 CPAP INITIATION&MGMT: CPT

## 2024-04-18 PROCEDURE — 99223 1ST HOSP IP/OBS HIGH 75: CPT | Mod: ,,,

## 2024-04-18 PROCEDURE — 25000003 PHARM REV CODE 250: Performed by: STUDENT IN AN ORGANIZED HEALTH CARE EDUCATION/TRAINING PROGRAM

## 2024-04-18 PROCEDURE — 94761 N-INVAS EAR/PLS OXIMETRY MLT: CPT

## 2024-04-18 PROCEDURE — 11000001 HC ACUTE MED/SURG PRIVATE ROOM

## 2024-04-18 PROCEDURE — 84100 ASSAY OF PHOSPHORUS: CPT | Performed by: HOSPITALIST

## 2024-04-18 PROCEDURE — 83735 ASSAY OF MAGNESIUM: CPT | Performed by: HOSPITALIST

## 2024-04-18 PROCEDURE — 80048 BASIC METABOLIC PNL TOTAL CA: CPT | Performed by: HOSPITALIST

## 2024-04-18 PROCEDURE — 25000003 PHARM REV CODE 250: Performed by: HOSPITALIST

## 2024-04-18 PROCEDURE — 99900035 HC TECH TIME PER 15 MIN (STAT)

## 2024-04-18 PROCEDURE — 63600175 PHARM REV CODE 636 W HCPCS: Performed by: HOSPITALIST

## 2024-04-18 PROCEDURE — 27000221 HC OXYGEN, UP TO 24 HOURS

## 2024-04-18 PROCEDURE — 36415 COLL VENOUS BLD VENIPUNCTURE: CPT | Performed by: HOSPITALIST

## 2024-04-18 PROCEDURE — 94640 AIRWAY INHALATION TREATMENT: CPT

## 2024-04-18 PROCEDURE — 25000242 PHARM REV CODE 250 ALT 637 W/ HCPCS: Performed by: STUDENT IN AN ORGANIZED HEALTH CARE EDUCATION/TRAINING PROGRAM

## 2024-04-18 PROCEDURE — 85025 COMPLETE CBC W/AUTO DIFF WBC: CPT | Performed by: HOSPITALIST

## 2024-04-18 RX ORDER — HYDROCHLOROTHIAZIDE 25 MG/1
25 TABLET ORAL DAILY
Status: DISCONTINUED | OUTPATIENT
Start: 2024-04-18 | End: 2024-04-19 | Stop reason: HOSPADM

## 2024-04-18 RX ADMIN — LISINOPRIL 20 MG: 20 TABLET ORAL at 08:04

## 2024-04-18 RX ADMIN — MUPIROCIN: 20 OINTMENT TOPICAL at 08:04

## 2024-04-18 RX ADMIN — IPRATROPIUM BROMIDE AND ALBUTEROL SULFATE 3 ML: 2.5; .5 SOLUTION RESPIRATORY (INHALATION) at 04:04

## 2024-04-18 RX ADMIN — HYDROCHLOROTHIAZIDE 25 MG: 25 TABLET ORAL at 08:04

## 2024-04-18 RX ADMIN — ATORVASTATIN CALCIUM 20 MG: 10 TABLET, FILM COATED ORAL at 08:04

## 2024-04-18 RX ADMIN — METHYLPREDNISOLONE SODIUM SUCCINATE 40 MG: 125 INJECTION, POWDER, FOR SOLUTION INTRAMUSCULAR; INTRAVENOUS at 08:04

## 2024-04-18 RX ADMIN — IPRATROPIUM BROMIDE AND ALBUTEROL SULFATE 3 ML: 2.5; .5 SOLUTION RESPIRATORY (INHALATION) at 08:04

## 2024-04-18 RX ADMIN — MICONAZOLE NITRATE: 20 POWDER TOPICAL at 08:04

## 2024-04-18 RX ADMIN — IPRATROPIUM BROMIDE AND ALBUTEROL SULFATE 3 ML: 2.5; .5 SOLUTION RESPIRATORY (INHALATION) at 12:04

## 2024-04-18 RX ADMIN — ASPIRIN 81 MG: 81 TABLET, COATED ORAL at 08:04

## 2024-04-18 RX ADMIN — ENOXAPARIN SODIUM 40 MG: 40 INJECTION SUBCUTANEOUS at 04:04

## 2024-04-18 NOTE — PLAN OF CARE
Patient remained free of fall/injury during shift. Patient educated on safety precautions. Patient verbalized understanding of instructions. Patient updated on POC throughout shift.  Patient in bed, AAOX4, RR even and unlabored on 1L nc.  No acute distress noted. Will report to night nurse.      Problem: Adult Inpatient Plan of Care  Goal: Plan of Care Review  Outcome: Ongoing, Progressing  Flowsheets (Taken 4/18/2024 7371)  Plan of Care Reviewed With: patient  Goal: Patient-Specific Goal (Individualized)  Outcome: Ongoing, Progressing  Goal: Absence of Hospital-Acquired Illness or Injury  Outcome: Ongoing, Progressing  Goal: Optimal Comfort and Wellbeing  Outcome: Ongoing, Progressing  Goal: Readiness for Transition of Care  Outcome: Ongoing, Progressing     Problem: Adjustment to Illness COPD (Chronic Obstructive Pulmonary Disease)  Goal: Optimal Chronic Illness Coping  Outcome: Ongoing, Progressing     Problem: Functional Ability Impaired COPD (Chronic Obstructive Pulmonary Disease)  Goal: Optimal Level of Functional [place]  Outcome: Ongoing, Progressing     Problem: Infection COPD (Chronic Obstructive Pulmonary Disease)  Goal: Absence of Infection Signs and Symptoms  Outcome: Ongoing, Progressing     Problem: Oral Intake Inadequate COPD (Chronic Obstructive Pulmonary Disease)  Goal: Improved Nutrition Intake  Outcome: Ongoing, Progressing     Problem: Respiratory Compromise COPD (Chronic Obstructive Pulmonary Disease)  Goal: Effective Oxygenation and Ventilation  Outcome: Ongoing, Progressing     Problem: Fall Injury Risk  Goal: Absence of Fall and Fall-Related Injury  Outcome: Ongoing, Progressing

## 2024-04-18 NOTE — ASSESSMENT & PLAN NOTE
Chronic, controlled. Latest blood pressure and vitals reviewed-     Temp:  [97.5 °F (36.4 °C)-99.2 °F (37.3 °C)]   Pulse:  []   Resp:  [16-20]   BP: (136-185)/(71-79)   SpO2:  [91 %-98 %] .   Home meds for hypertension were reviewed and noted below.   Hypertension Medications               lisinopriL-hydrochlorothiazide (PRINZIDE,ZESTORETIC) 20-25 mg Tab TAKE 1 TABLET BY MOUTH EVERY DAY            While in the hospital, will manage blood pressure as follows; Continue home antihypertensive regimen    Will utilize p.r.n. blood pressure medication only if patient's blood pressure greater than 180/110 and she develops symptoms such as worsening chest pain or shortness of breath.

## 2024-04-18 NOTE — SUBJECTIVE & OBJECTIVE
Interval History:  No acute overnight events.  Patient remained afebrile.  Admits feeling better today.  Stated that she walked the hallway without issues yesterday. Wants to walk again today. Still has some shortness a breath and wheezing, but significantly improving. Admits cough, denies any congestion.  No chest pain    Review of Systems   Constitutional:  Positive for activity change and fatigue. Negative for fever.   Respiratory:  Positive for cough, shortness of breath and wheezing. Negative for chest tightness.    Cardiovascular:  Negative for chest pain and leg swelling.   Psychiatric/Behavioral:  Negative for agitation and confusion.      Objective:     Vital Signs (Most Recent):  Temp: 97.5 °F (36.4 °C) (04/18/24 1100)  Pulse: 105 (04/18/24 1253)  Resp: 18 (04/18/24 1253)  BP: 137/71 (04/18/24 1100)  SpO2: (!) 91 % (04/18/24 1253) Vital Signs (24h Range):  Temp:  [97.5 °F (36.4 °C)-99.2 °F (37.3 °C)] 97.5 °F (36.4 °C)  Pulse:  [] 105  Resp:  [16-20] 18  SpO2:  [91 %-98 %] 91 %  BP: (136-185)/(71-79) 137/71     Weight: 85 kg (187 lb 6.3 oz)  Body mass index is 36.6 kg/m².    Intake/Output Summary (Last 24 hours) at 4/18/2024 1337  Last data filed at 4/18/2024 1100  Gross per 24 hour   Intake 360 ml   Output 500 ml   Net -140 ml         Physical Exam  Vitals and nursing note reviewed.   Constitutional:       General: She is not in acute distress.     Appearance: She is obese. She is not ill-appearing.   HENT:      Head: Normocephalic and atraumatic.      Mouth/Throat:      Mouth: Mucous membranes are moist.   Cardiovascular:      Rate and Rhythm: Normal rate and regular rhythm.   Pulmonary:      Breath sounds: Wheezing present.      Comments: On 1L NC. Still with diffuse wheezing, but overall improved.  Prolonged expiratory phase.  Abdominal:      General: There is no distension.      Palpations: Abdomen is soft.      Tenderness: There is no abdominal tenderness.   Musculoskeletal:      Cervical back:  Normal range of motion.      Right lower leg: No edema.      Left lower leg: No edema.   Skin:     General: Skin is warm and dry.      Capillary Refill: Capillary refill takes less than 2 seconds.   Neurological:      General: No focal deficit present.      Mental Status: She is alert and oriented to person, place, and time.   Psychiatric:         Mood and Affect: Mood normal.         Behavior: Behavior normal.         Thought Content: Thought content normal.             Significant Labs: All pertinent labs within the past 24 hours have been reviewed.    Significant Imaging: I have reviewed all pertinent imaging results/findings within the past 24 hours.

## 2024-04-18 NOTE — NURSING
[facility], [facility]  Nurses Note -- 4 Eyes      4/18/2024       Skin assessed on: Q Shift      [x] No Pressure Injuries Present    [x]Prevention Measures Documented    [] Yes LDA  for Pressure Injury Previously documented     [] Yes New Pressure Injury Discovered   [] LDA for New Pressure Injury Added      Attending RN:  Giuliano Andrews RN     Second RN: Pablo MICHEL RN

## 2024-04-18 NOTE — ASSESSMENT & PLAN NOTE
Patient has hypokalemia which is Acute and currently controlled. Most recent potassium levels reviewed-   Lab Results   Component Value Date    K 4.9 04/18/2024   . Will continue potassium replacement per protocol and recheck repeat levels after replacement completed.     -replace as needed

## 2024-04-18 NOTE — NURSING
[facility], [facility]  Nurses Note -- 4 Eyes      4/17/2024       Skin assessed on: Q Shift      [x] No Pressure Injuries Present    []Prevention Measures Documented    [] Yes LDA  for Pressure Injury Previously documented     [] Yes New Pressure Injury Discovered   [] LDA for New Pressure Injury Added      Attending RN:  Pablo Levy RN     Second RN:  THOMAS Reyes

## 2024-04-18 NOTE — CONSULTS
[facility] Surg  Wound Care  WOC PATRICE    Patient Name:  Chanel Esparza   MRN:  [id number]  Date: 2024  Diagnosis: Acute respiratory failure with hypoxia and hypercapnia    History:     Past Medical History:   Diagnosis Date    Asthma     Emphysema of lung     Hypertension        Social History     Socioeconomic History    Marital status:    Tobacco Use    Smoking status: Some Days     Current packs/day: 0.00     Average packs/day: 1 pack/day for 55.0 years (55.0 ttl pk-yrs)     Types: Cigarettes     Start date: 1968     Last attempt to quit: 3/5/2018     Years since quittin.1    Smokeless tobacco: Never   Substance and Sexual Activity    Alcohol use: No    Drug use: No    Sexual activity: Not Currently     Social Determinants of Health     Financial Resource Strain: High Risk (2024)    Overall Financial Resource Strain (CARDIA)     Difficulty of Paying Living Expenses: Very hard   Food Insecurity: Food Insecurity Present (2024)    Hunger Vital Sign     Worried About Running Out of Food in the Last Year: Often true     Ran Out of Food in the Last Year: Often true   Transportation Needs: No Transportation Needs (2024)    PRAPARE - Transportation     Lack of Transportation (Medical): No     Lack of Transportation (Non-Medical): No   Physical Activity: Inactive (2024)    Exercise Vital Sign     Days of Exercise per Week: 0 days     Minutes of Exercise per Session: 0 min   Stress: No Stress Concern Present (2024)    [place] [employer] - Occupational Stress Questionnaire     Feeling of Stress : Only a little   Social Connections: Socially Isolated (4/15/2024)    Social Connection and Isolation Panel [NHANES]     Frequency of Communication with Friends and Family: Twice a week     Frequency of Social Gatherings with Friends and Family: Twice a week     Attends [employer] Services: Never     Active Member of Clubs or Organizations: No     Attends Club or  Organization Meetings: Never     Marital Status:    Housing Stability: Low Risk  (2/14/2024)    Housing Stability Vital Sign     Unable to Pay for Housing in the Last Year: No     Number of Places Lived in the Last Year: 1     Unstable Housing in the Last Year: No       Precautions:     Allergies as of 04/15/2024    (No Known Allergies)       [facility] Assessment Details/Treatment     Active Problem List with Overview Notes    Diagnosis Date Noted    Severe aortic stenosis 04/17/2024    Hypokalemia 04/17/2024    Leukocytosis 04/17/2024    COPD exacerbation 04/15/2024    Acute respiratory failure with hypoxia and hypercapnia 04/15/2024    Elevated brain natriuretic peptide (BNP) level 04/15/2024    Advanced care planning/counseling discussion 04/15/2024    Mass of right parotid gland 09/27/2023    Nodule of parotid gland 09/27/2023    Morbid obesity 01/13/2020    Other specified glaucoma 01/09/2019    Aortic atherosclerosis 12/13/2016     CXR 2016      Chronic bronchitis 10/21/2015    Mild intermittent asthma without complication 10/21/2015    Hypertension 10/12/2015      Nursing consult for altered skin integrity under breast, belly folds, and inguinal area  A 68 year old female admitted 4/15/24 from home with complaint of shortness of breath with history of COPD  4/18 WBC 12.47 Hgb 15.1 Hct 48.3  4/15 Alb 3.7 Weight 187 lbs   On Isoflex mattress; Jose score 19  4/15 5:44 pm 4 Eyes Skin Assessment- No Pressure Injuries Present  Assessment:  Light erythema in skin folds. Covered with miconazole powder.  Treatment/Plan:  Nursing continuing miconazole ordered on admission.   Discussed measures to manage moisture in skin folds. Instructed on cleansing, drying and using moisture barrier.   Voiced understanding.    04/18/2024

## 2024-04-18 NOTE — ASSESSMENT & PLAN NOTE
Patient with Hypercapnic and Hypoxic Respiratory failure which is Acute.  she is not on home oxygen. Supplemental oxygen was provided and noted- Oxygen Concentration (%):  [28-30] 30.   Signs/symptoms of respiratory failure include- tachypnea, increased work of breathing, respiratory distress, use of accessory muscles, and wheezing. Contributing diagnoses includes - COPD Labs and images were reviewed. Patient Has recent ABG, which has been reviewed. Will treat underlying causes and adjust management of respiratory failure as follows-     -presented with cough, shortness for breath, and hypoxia.  O2 sats of 86% on room air  -admitted to ICU for continuous BiPAP, able to wean off to nasal cannula.she is very adamant about DNR/DNI  -respiratory panel pending   -scheduled DuoNebs, and p.r.n. for wheezing and shortness a breath  -IV Solumedrol 40 mg daily, needs 5 day course   -Ceftriaxone and Azithromycin coverage, plan for 5 day course   -pulmonology consulted:  Continue antibiotics and steroids.   -check TTE: echocardiogram with severe aortic stenosis, EF 65-70%, grade 1 diastolic dysfunction.  -Transfer to floor on 04/16  -wean O2 as tolerated

## 2024-04-19 VITALS
SYSTOLIC BLOOD PRESSURE: 127 MMHG | WEIGHT: 187.38 LBS | RESPIRATION RATE: 18 BRPM | TEMPERATURE: 98 F | HEART RATE: 99 BPM | OXYGEN SATURATION: 90 % | BODY MASS INDEX: 36.79 KG/M2 | HEIGHT: 60 IN | DIASTOLIC BLOOD PRESSURE: 86 MMHG

## 2024-04-19 LAB
ANION GAP SERPL CALC-SCNC: 11 MMOL/L (ref 8–16)
BACTERIA BLD CULT: NORMAL
BACTERIA BLD CULT: NORMAL
BUN SERPL-MCNC: 46 MG/DL (ref 8–23)
CALCIUM SERPL-MCNC: 9.8 MG/DL (ref 8.7–10.5)
CHLORIDE SERPL-SCNC: 100 MMOL/L (ref 95–110)
CO2 SERPL-SCNC: 30 MMOL/L (ref 23–29)
CREAT SERPL-MCNC: 1.1 MG/DL (ref 0.5–1.4)
EST. GFR  (NO RACE VARIABLE): 54 ML/MIN/1.73 M^2
GLUCOSE SERPL-MCNC: 94 MG/DL (ref 70–110)
MAGNESIUM SERPL-MCNC: 2.2 MG/DL (ref 1.6–2.6)
PHOSPHATE SERPL-MCNC: 4.1 MG/DL (ref 2.7–4.5)
POTASSIUM SERPL-SCNC: 5.2 MMOL/L (ref 3.5–5.1)
SODIUM SERPL-SCNC: 141 MMOL/L (ref 136–145)

## 2024-04-19 PROCEDURE — 63600175 PHARM REV CODE 636 W HCPCS: Performed by: STUDENT IN AN ORGANIZED HEALTH CARE EDUCATION/TRAINING PROGRAM

## 2024-04-19 PROCEDURE — 97161 PT EVAL LOW COMPLEX 20 MIN: CPT

## 2024-04-19 PROCEDURE — 94761 N-INVAS EAR/PLS OXIMETRY MLT: CPT

## 2024-04-19 PROCEDURE — 63600175 PHARM REV CODE 636 W HCPCS: Performed by: HOSPITALIST

## 2024-04-19 PROCEDURE — 97535 SELF CARE MNGMENT TRAINING: CPT

## 2024-04-19 PROCEDURE — 83735 ASSAY OF MAGNESIUM: CPT | Performed by: HOSPITALIST

## 2024-04-19 PROCEDURE — 25000003 PHARM REV CODE 250: Performed by: HOSPITALIST

## 2024-04-19 PROCEDURE — 80048 BASIC METABOLIC PNL TOTAL CA: CPT | Performed by: HOSPITALIST

## 2024-04-19 PROCEDURE — 97166 OT EVAL MOD COMPLEX 45 MIN: CPT

## 2024-04-19 PROCEDURE — 94640 AIRWAY INHALATION TREATMENT: CPT

## 2024-04-19 PROCEDURE — 99900031 HC PATIENT EDUCATION (STAT)

## 2024-04-19 PROCEDURE — 84100 ASSAY OF PHOSPHORUS: CPT | Performed by: HOSPITALIST

## 2024-04-19 PROCEDURE — 25000003 PHARM REV CODE 250: Performed by: STUDENT IN AN ORGANIZED HEALTH CARE EDUCATION/TRAINING PROGRAM

## 2024-04-19 PROCEDURE — 25000242 PHARM REV CODE 250 ALT 637 W/ HCPCS: Performed by: STUDENT IN AN ORGANIZED HEALTH CARE EDUCATION/TRAINING PROGRAM

## 2024-04-19 PROCEDURE — 97530 THERAPEUTIC ACTIVITIES: CPT

## 2024-04-19 PROCEDURE — 36415 COLL VENOUS BLD VENIPUNCTURE: CPT | Performed by: HOSPITALIST

## 2024-04-19 RX ORDER — PREDNISONE 20 MG/1
40 TABLET ORAL DAILY
Qty: 2 TABLET | Refills: 0 | Status: SHIPPED | OUTPATIENT
Start: 2024-04-20 | End: 2024-04-26

## 2024-04-19 RX ORDER — FUROSEMIDE 10 MG/ML
20 INJECTION INTRAMUSCULAR; INTRAVENOUS ONCE
Status: COMPLETED | OUTPATIENT
Start: 2024-04-19 | End: 2024-04-19

## 2024-04-19 RX ORDER — DOXYCYCLINE HYCLATE 100 MG
100 TABLET ORAL 2 TIMES DAILY
Qty: 2 TABLET | Refills: 0 | Status: SHIPPED | OUTPATIENT
Start: 2024-04-20 | End: 2024-04-26

## 2024-04-19 RX ADMIN — IPRATROPIUM BROMIDE AND ALBUTEROL SULFATE 3 ML: 2.5; .5 SOLUTION RESPIRATORY (INHALATION) at 07:04

## 2024-04-19 RX ADMIN — METHYLPREDNISOLONE SODIUM SUCCINATE 40 MG: 125 INJECTION, POWDER, FOR SOLUTION INTRAMUSCULAR; INTRAVENOUS at 08:04

## 2024-04-19 RX ADMIN — LISINOPRIL 20 MG: 20 TABLET ORAL at 08:04

## 2024-04-19 RX ADMIN — IPRATROPIUM BROMIDE AND ALBUTEROL SULFATE 3 ML: 2.5; .5 SOLUTION RESPIRATORY (INHALATION) at 11:04

## 2024-04-19 RX ADMIN — FUROSEMIDE 20 MG: 10 INJECTION, SOLUTION INTRAMUSCULAR; INTRAVENOUS at 10:04

## 2024-04-19 RX ADMIN — ASPIRIN 81 MG: 81 TABLET, COATED ORAL at 08:04

## 2024-04-19 RX ADMIN — HYDROCHLOROTHIAZIDE 25 MG: 25 TABLET ORAL at 08:04

## 2024-04-19 RX ADMIN — MUPIROCIN: 20 OINTMENT TOPICAL at 08:04

## 2024-04-19 RX ADMIN — IPRATROPIUM BROMIDE AND ALBUTEROL SULFATE 3 ML: 2.5; .5 SOLUTION RESPIRATORY (INHALATION) at 04:04

## 2024-04-19 RX ADMIN — CEFTRIAXONE 2 G: 2 INJECTION, POWDER, FOR SOLUTION INTRAMUSCULAR; INTRAVENOUS at 12:04

## 2024-04-19 RX ADMIN — MICONAZOLE NITRATE: 20 POWDER TOPICAL at 08:04

## 2024-04-19 NOTE — PLAN OF CARE
Case Management Final Discharge Note      Discharge Disposition: Home with [facility]    New DME ordered / company name: [facility] oxygen and rollator to home only dme     Relevant SDOH / Transition of Care Barriers:  none    Primary Caretaker and contact information: Eli Rush (Sister)  [phone] (Mobile)     Scheduled followup appointment: see avs    Referrals placed: sent     Transportation: private vehicle    I provided the patient a choice of post acute providers and offered a list of CMS rated home health, If patient does not have a preference please remove this reminder and the above numbered list and add the following sentence:  Patient has declined to select a preferred provider and elects placement with the first accepting in network provider that is available to provide services as ordered by the physician.       Patient and family educated on discharge services and updated on DC plan. Bedside RN notified, patient clear to discharge from Case Management Perspective.    04/19/24 1602   Final Note   Assessment Type Final Discharge Note   Anticipated Discharge Disposition Home-Health   What phone number can be called within the next 1-3 days to see how you are doing after discharge?   (see chart)   Hospital Resources/Appts/Education Provided Provided patient/caregiver with written discharge plan information;Appointments scheduled and added to AVS   Post-Acute Status   Post-Acute Authorization Home Health;HME   HME Status Set-up Complete/Auth obtained   Home Health Status Set-up Complete/Auth obtained   Coverage [facility] SECURE SNP -   Discharge Delays None known at this time

## 2024-04-19 NOTE — PT/OT/SLP EVAL
Occupational Therapy   Evaluation and Treatment    Name: Chanel Esparza  MRN: [id number]  Admitting Diagnosis: Acute respiratory failure with hypoxia and hypercapnia  Recent Surgery: * No surgery found *      Recommendations:     Discharge Recommendations: Low Intensity Therapy  Discharge Equipment Recommendations:  shower chair (anticipate need for rollator though will defer AD needs to PT; will also defer O2 needs to RT and MD)  Barriers to discharge:   (increased respiratory needs)    Assessment:   Initial OT eval/treat complete.  O2 sats seated EOB at rest found to be 89% and improving to 93-94% when instructed on deep breathing tech after approx. 2-minutes.  Ambulating short household distance around bed with CGA for steadying/safety and noted to dem furniture cruising; Ambulating remaining distance bathroom>sink>bedside chair with RW, SBA for safety, and seated rest break once reaching bathroom with MIN verbal cuing on deep breathing tech and good follow through.  O2 sats decreasing to 84% after ambulation task though improving to 89-90% after 2-3 minute rest with deep breathing.  Toileting and donning mesh underwear with verbal cue for 2 seated rest breaks and reminder for deep breathing tech with good follow through.  No DME in use PTA.  Needs shower chair.  Anticipate need for rollator though will defer AD needs to PT.  Will also defer O2 needs to RT and MD.  Recommend post acute Low Intensity therapy.  Lives alone though sister visits for outings throughout the week.  To benefit from continued acute care OT services to increase independence in self-care/functional transfers.  OT to follow.     Chanel Esparza is a 68 y.o. female with a medical diagnosis of Acute respiratory failure with hypoxia and hypercapnia.  She presents with below deficits decreasing independence in self-care/functional transfers. Performance deficits affecting function: impaired endurance, impaired self care skills, impaired balance, gait  instability, impaired functional mobility, impaired cardiopulmonary response to activity, weakness.      Rehab Prognosis: Good; patient would benefit from acute skilled OT services to address these deficits and reach maximum level of function.       Plan:     Patient to be seen 4 x/week to address the above listed problems via self-care/home management, therapeutic activities, therapeutic exercises  Plan of Care Expires: 05/03/24  Plan of Care Reviewed with: patient    Subjective     Chief Complaint: No c/o pain.   Patient/Family Comments/goals: No goal stated at this time.     Occupational Profile:  Lives alone in [address] [place] [address] with elevator access; bathroom setup as tub/shower combo and standard toilet.  Previously independent in ADL, IADL, cooking, and cleaning.  Pt. Reports that her sister visits several times throughout the week for outings.    Equipment Used at Home: none  Assistance upon Discharge: Pt. Lives alone.     Pain/Comfort:  Pain Rating 1: 0/10  Pain Rating Post-Intervention 1: 0/10    Patients cultural, spiritual, Bahai conflicts given the current situation:  (None stated.)    Objective:     Communicated with: Sheree PHILIP RN prior to session.  Patient found sitting edge of bed with peripheral IV upon OT entry to room.    General Precautions: Standard, fall, respiratory  Orthopedic Precautions: N/A  Braces: N/A  Respiratory Status: Room air    Occupational Performance:    Vitals:  O2 sats on RA at rest:  89-90%  O2 sats on RA at rest with after pursed lip breathing X 2-minutes:  94%  O2 sats on RA ambulating bed>bathroom :  84%  O2 sats on RA improving to 91% after 2-3 minutes of seated rest and pursed lip breathing    Functional Mobility/Transfers:  Sit>stand from EOB with SBA and ambulating around bed with CGA for steadying/safety and furniture cruising noted; ambulating remaining distance to bathroom with RW and SBA for safety.  Step transfer to standard toilet with SBA and MIN cuing for safe hand  placement during controlled descent.  Requiring seated rest once reaching toilet with verbal cuing for deep breathing tech and good follow through.  Ambulating bathroom>sink>bedside chair with RW and SBA for safety.  Step transfer to bedside chair with SBA and MIN cuing for safe timing of controlled descent.     Activities of Daily Living:  Voiding seated at toilet and requiring SBA for safety during clothing management task; RN made aware of unmeasured void.  Handed mesh underwear while seated at toilet and threading BLE into clothing with combined crossed leg tech and downward reaching with verbal cue needed for seated rest and deep breathing tech after with good follow through; able to pull clothing to waist in stance overall requiring SBA for LB dress task.  Hand hygiene in stance at sink with SBA needed for safe RW positioning just prior to task.      Cognitive/Visual Perceptual:  Cognitive/Psychosocial Skills:  -       Oriented to: Person, Place, Time, and Situation   -       Follows Commands/attention:Follows one-step commands  -       Communication: clear/fluent  -       Memory: No Deficits noted  -       Safety awareness/insight to disability: intact   -       Mood/Affect/Coping skills/emotional control: Cooperative and Pleasant  Visual/Perceptual:  -grossly intact    Physical Exam:  Postural examination/scapula alignment: -       Rounded shoulders  -       Forward head  Upper Extremity Range of Motion:  -       Right Upper Extremity: WFL  -       Left Upper Extremity: WFL  Upper Extremity Strength:     Strength: -       Right Upper Extremity: WFL  -       Left Upper Extremity: WFL  Fine Motor Coordination: -       Intact  Left hand thumb/finger opposition skills and Right hand thumb/finger opposition skills    AMPAC 6 Click ADL:  AMPAC Total Score: 20    Treatment & Education:  Educated on role of OT and POC.    Sit>stand from EOB with SBA and ambulating around bed with CGA for steadying/safety and  furniture cruising noted; ambulating remaining distance to bathroom with RW and SBA for safety.  Step transfer to standard toilet with SBA and MIN cuing for safe hand placement during controlled descent.  Requiring seated rest once reaching toilet with verbal cuing for deep breathing tech and good follow through.  Ambulating bathroom>sink>bedside chair with RW and SBA for safety.  Step transfer to bedside chair with SBA and MIN cuing for safe timing of controlled descent.   Voiding seated at toilet and requiring SBA for safety during clothing management task; RN made aware of unmeasured void.  Handed mesh underwear while seated at toilet and threading BLE into clothing with combined crossed leg tech and downward reaching with verbal cue needed for seated rest and deep breathing tech after with good follow through; able to pull clothing to waist in stance overall requiring SBA for LB dress task.  Hand hygiene in stance at sink with SBA needed for safe RW positioning just prior to task.    Educated on energy conservation including deep breathing and self-pacing.  Pt. Initially wearing Purewick though reports being continent of bladder and bowel.  Discussed with Pt. And RN ambulating to bathroom with staff assist; RN and Pt. Agreeable and verbalized understanding.   Received call light review and importance of calling for assist as needed though especially with OOB activity.     Patient left up in chair with all lines intact, call button in reach, and nursing notified.    GOALS:   Multidisciplinary Problems       Occupational Therapy Goals          Problem: Occupational Therapy    Goal Priority Disciplines Outcome Interventions   Occupational Therapy Goal     OT, PT/OT Ongoing, Progressing    Description: Goals to be met by: 5/3/2024     Patient will increase functional independence with ADLs by performing:    UE Dressing with Modified [place] including clothing retrieval with 2 seated rest breaks.  LE Dressing  with Modified [place] including clothing retrieval with 2 seated rest breaks.  Grooming while standing at sink with Modified [place].  Toileting from toilet with Modified [place] for hygiene and clothing management.   Bathing from  shower chair/bench with Modified [place] with 4 seated rest breaks and good self-initiation of deep breathing technique.  Toilet transfer to toilet with Modified [place].                         History:     Past Medical History:   Diagnosis Date    Asthma     Emphysema of lung     Hypertension          Past Surgical History:   Procedure Laterality Date     SECTION      CHOLECYSTECTOMY      DIRECT LARYNGOSCOPY N/A 10/03/2023    Procedure: LARYNGOSCOPY, DIRECT;  Surgeon: Lyudmila Barrera MD;  Location: [facility] OR;  Service: ENT;  Laterality: N/A;    EXCISION OF PAROTID GLAND Right 10/03/2023    Procedure: EXCISION, PAROTID GLAND;  Surgeon: Lyudmila Barrera MD;  Location: [facility] OR;  Service: ENT;  Laterality: Right;  NEUROMONITORING CAITY ANGELO [phone]    RN PREOP 2023---CONSENTS INCOMPLETE    EYE SURGERY      Cataract Surgery    HYSTERECTOMY      CAPRICE    TONSILLECTOMY      TUBAL LIGATION         Time Tracking:     OT Date of Treatment: 24  OT Start Time: 1017  OT Stop Time: 1057  OT Total Time (min): 40 min    Billable Minutes:Evaluation 15  Self Care/Home Management 25    2024

## 2024-04-19 NOTE — PLAN OF CARE
Problem: Adult Inpatient Plan of Care  Goal: Plan of Care Review  Outcome: Adequate for Care Transition  Goal: Patient-Specific Goal (Individualized)  Outcome: Adequate for Care Transition  Goal: Absence of Hospital-Acquired Illness or Injury  Outcome: Adequate for Care Transition  Goal: Optimal Comfort and Wellbeing  Outcome: Adequate for Care Transition  Goal: Readiness for Transition of Care  Outcome: Adequate for Care Transition     Problem: Adjustment to Illness COPD (Chronic Obstructive Pulmonary Disease)  Goal: Optimal Chronic Illness Coping  Outcome: Adequate for Care Transition     Problem: Functional Ability Impaired COPD (Chronic Obstructive Pulmonary Disease)  Goal: Optimal Level of Functional [place]  Outcome: Adequate for Care Transition     Problem: Infection COPD (Chronic Obstructive Pulmonary Disease)  Goal: Absence of Infection Signs and Symptoms  Outcome: Adequate for Care Transition     Problem: Oral Intake Inadequate COPD (Chronic Obstructive Pulmonary Disease)  Goal: Improved Nutrition Intake  Outcome: Adequate for Care Transition     Problem: Respiratory Compromise COPD (Chronic Obstructive Pulmonary Disease)  Goal: Effective Oxygenation and Ventilation  Outcome: Adequate for Care Transition     Problem: Fall Injury Risk  Goal: Absence of Fall and Fall-Related Injury  Outcome: Adequate for Care Transition

## 2024-04-19 NOTE — PLAN OF CARE
Problem: Adult Inpatient Plan of Care  Goal: Plan of Care Review  Outcome: Met  Goal: Patient-Specific Goal (Individualized)  Outcome: Met  Goal: Absence of Hospital-Acquired Illness or Injury  Outcome: Met  Goal: Optimal Comfort and Wellbeing  Outcome: Met  Goal: Readiness for Transition of Care  Outcome: Met     Problem: Adjustment to Illness COPD (Chronic Obstructive Pulmonary Disease)  Goal: Optimal Chronic Illness Coping  Outcome: Met     Problem: Functional Ability Impaired COPD (Chronic Obstructive Pulmonary Disease)  Goal: Optimal Level of Functional [place]  Outcome: Met     Problem: Infection COPD (Chronic Obstructive Pulmonary Disease)  Goal: Absence of Infection Signs and Symptoms  Outcome: Met     Problem: Oral Intake Inadequate COPD (Chronic Obstructive Pulmonary Disease)  Goal: Improved Nutrition Intake  Outcome: Met     Problem: Respiratory Compromise COPD (Chronic Obstructive Pulmonary Disease)  Goal: Effective Oxygenation and Ventilation  Outcome: Met     Problem: Fall Injury Risk  Goal: Absence of Fall and Fall-Related Injury  Outcome: Met     Problem: Occupational Therapy  Goal: Occupational Therapy Goal  Description: Goals to be met by: 5/3/2024     Patient will increase functional independence with ADLs by performing:    UE Dressing with Modified [place] including clothing retrieval with 2 seated rest breaks.  LE Dressing with Modified [place] including clothing retrieval with 2 seated rest breaks.  Grooming while standing at sink with Modified [place].  Toileting from toilet with Modified [place] for hygiene and clothing management.   Bathing from  shower chair/bench with Modified [place] with 4 seated rest breaks and good self-initiation of deep breathing technique.  Toilet transfer to toilet with Modified [place].    Outcome: Met     Problem: Physical Therapy  Goal: Physical Therapy Goal  Outcome: Met

## 2024-04-19 NOTE — NURSING
Home Oxygen Evaluation    Date Performed: 2024    1) Patient's Home O2 Sat on room air, while at rest: 89%            2) Patient's O2 Sat on room air while exercisin%              3) Patient's O2 Sat while exercising on O2: 92% at 1 LPM

## 2024-04-19 NOTE — PLAN OF CARE
Problem: Adult Inpatient Plan of Care  Goal: Absence of Hospital-Acquired Illness or Injury  Outcome: Ongoing, Progressing  Intervention: Identify and Manage Fall Risk  Flowsheets (Taken 4/19/2024 4109)  Safety Promotion/Fall Prevention:   assistive device/personal item within reach   bed alarm set   side rails raised x 2   nonskid shoes/socks when out of bed   room near unit station  Intervention: Prevent Skin Injury  Flowsheets (Taken 4/19/2024 4729)  Body Position: side-lying     Problem: Respiratory Compromise COPD (Chronic Obstructive Pulmonary Disease)  Goal: Effective Oxygenation and Ventilation  Outcome: Ongoing, Progressing

## 2024-04-19 NOTE — PLAN OF CARE
Problem: Physical Therapy  Goal: Physical Therapy Goal  Outcome: Adequate for Care Transition   Initial PT evaluation performed today.  Low Intensity Therapy and Rollator recommended to address deficits in cardio-pulmonary endurance, balance, and gait.  Pt ambulated 2 x 30' with Rollator, Supervision, and 2l O2 with seated rest break in between for Pursed Lip breathing.  PT to sign off.  OK for OOB to chair and ambulate to bathroom with Oxygen and Nursing staff.

## 2024-04-19 NOTE — PT/OT/SLP EVAL
Physical Therapy Evaluation and Discharge Note    Patient Name:  Chanel Esparza   MRN:  [id number]    Recommendations:     Discharge Recommendations: Low Intensity Therapy  Discharge Equipment Recommendations: rollator, shower chair   Barriers to discharge:  Awaiting O2 and DME    Assessment:     Chanel Esparza is a 68 y.o. female admitted with a medical diagnosis of Acute respiratory failure with hypoxia and hypercapnia. .  At this time, patient is functioning at their prior level of function and does not require further acute PT services.     Recent Surgery: * No surgery found *      Plan:     During this hospitalization, patient does not require further acute PT services.  Please re-consult if situation changes.      Subjective     Chief Complaint: Claustrophobia  Patient/Family Comments/goals: to return to PLOF, to go to the bathroom presently, and to be D/C'd home   Pain/Comfort:  Pain Rating 1: 0/10    Patients cultural, spiritual, Confucianism conflicts given the current situation: no    Living Environment:  Pt lives alone in an apartment with an elevator   Prior to admission, patients level of function was Independent with ambulation and ADL's.  Equipment used at home: none.  DME owned (not currently used): none.  Upon discharge, patient will have assistance from Sister visits regularly.    Objective:     Communicated with nsg prior to session.  Patient found up in chair with peripheral IV upon PT entry to room.    General Precautions: Standard, fall, respiratory    Orthopedic Precautions:N/A   Braces: N/A  Respiratory Status: Room air     Exams:  Cognitive Exam:  Patient is oriented to Person, Place, Time, and Situation  Gross Motor Coordination:  WFL  Postural Exam:  Patient presented with the following abnormalities:    -       Rounded shoulders  -       Forward head  Sensation:    -       Intact  N/T  Skin Integrity/Edema:      -       Skin integrity: Visible skin intact  -       Edema: None noted    RLE ROM:  WFL  RLE Strength: WFL  LLE ROM: WFL  LLE Strength: WFL    Functional Mobility:  Transfers:     Sit to Stand:  supervision with no AD and with Rollator and VC for hand placement and safety   Gait: Gait training with Rollator, Supervision, O2, and VC for walker management/safety with max decreased prasanth and Total A for O2 line management  Balance: Fair+ sit and stand    AM-PAC 6 CLICK MOBILITY  Total Score:22       Treatment and Education:  SPO2 at rest on RA 88%.  Educated pt on Pursed lip breathing with increased in SPO2 to 92%.  Pt furniture surfing to bathroom with CGA 2/2 generalized weakness/deconditioning and unsteady gait.  Pt performed commode transfer, toileting and hand hygiene with Supervision.  Gait training as above.  Re-educated  pt on Pursed lip breathing during seated rest break between bouts of gait training.  Educated pt on Energy conservation also and provided handouts to patient on both topics.          AM-PAC 6 CLICK MOBILITY  Total Score:22     Patient left up in chair with call button in reach and nsg notified.    GOALS:   Multidisciplinary Problems       Physical Therapy Goals          Problem: Physical Therapy    Goal Priority Disciplines Outcome Goal Variances Interventions   Physical Therapy Goal     PT, PT/OT Adequate for Care Transition                         History:     Past Medical History:   Diagnosis Date    Asthma     Emphysema of lung     Hypertension        Past Surgical History:   Procedure Laterality Date     SECTION      CHOLECYSTECTOMY      DIRECT LARYNGOSCOPY N/A 10/03/2023    Procedure: LARYNGOSCOPY, DIRECT;  Surgeon: Lyudmila Barrera MD;  Location: [facility] OR;  Service: ENT;  Laterality: N/A;    EXCISION OF PAROTID GLAND Right 10/03/2023    Procedure: EXCISION, PAROTID GLAND;  Surgeon: Lyudmila Barrera MD;  Location: [facility] OR;  Service: ENT;  Laterality: Right;  NEUROMONITORING CAITY ANGELO [phone]    RN PREOP 2023---CONSENTS INCOMPLETE    EYE  SURGERY  2022    Cataract Surgery    HYSTERECTOMY      CAPRICE    TONSILLECTOMY      TUBAL LIGATION  1973       Time Tracking:     PT Received On: 04/19/24  PT Start Time: 1337     PT Stop Time: 1400  PT Total Time (min): 23 min     Billable Minutes: Evaluation 15 and Therapeutic Activity 8      04/19/2024

## 2024-04-19 NOTE — PLAN OF CARE
04/19/24 1245   Medicare Message   Important Message from Medicare regarding Discharge Appeal Rights Given to patient/caregiver;Explained to patient/caregiver;Signed/date by patient/caregiver;Other (comments)   Date IMM was signed 04/19/24   Time IMM was signed [id number]

## 2024-04-19 NOTE — NURSING
Patient cleared for discharge by  and JESSEE Calle. Home O2 brought to patient's bedside. HH set up by SARITA.  AVS/discharge instructions printed, reviewed with by VN, and given to patient. Patient verbalizes understanding of instructions. Left arm PIVs removed, catheter tips intact, pressure held, dressings placed.,   Patient left unit via wheelchair with transporter and family.

## 2024-04-19 NOTE — NURSING
[facility] [place]  Nurses Note -- 4 Eyes      4/18/2024       Skin assessed on: Q Shift      [x] No Pressure Injuries Present    [x]Prevention Measures Documented    [] Yes LDA  for Pressure Injury Previously documented     [] Yes New Pressure Injury Discovered   [] LDA for New Pressure Injury Added      Attending RN:  Pablo Levy RN     Second RN:  THOMAS Nobles

## 2024-04-19 NOTE — DISCHARGE SUMMARY
[facility] Medicine  Discharge Summary      Patient Name: Chanel Esparza  MRN: [id number]  [facility]: [id number]  Patient Class: IP- Inpatient  Admission Date: 4/15/2024  Hospital Length of Stay: 4 days  Discharge Date and Time:  04/19/2024 4:42 PM  Attending Physician: Ksenia Restrepo DO   Discharging Provider: Ksenai Restrepo DO  Primary Care Provider: Warner Richey MD    Primary Care Team: Networked reference to record PCT     HPI:   Ms. Esparza is a 68-year-old female with past medical history of emphysema, asthma, hypertension who presents to the emergency department for worsening shortness breath.  Patient reports her difficulty breathing started last Tuesday and has slowly progressed.  She went for nuclear stress test on Friday which afterwards started to continue to worsen.  She was started on doxycycline and p.o. prednisone as an outpatient however this has not made any improvement.  She continues to wheeze and have difficulty with her breathing.  Reports having a dry cough but denies any fevers or chills.  No chest pain.  Discussed code status with her, she was very adamant she does not want to be resuscitated or put on breathing machine.  Her sister and daughter-in-law were present for this conversation.  Patient confirm DNR/DNI at this time.  She is currently on BiPAP continuous.  Pulmonology has been consulted.She will be admitted to ICU for further management.    * No surgery found *      Hospital Course:   67 y/o female admitted to ICU on Bipap with acute hypoxemic respiratory failure secondary to COPD exacerba chest x-ray with no focal consolidation.  Few coarse interstitial markings.  Echocardiogram with severe aortic stenosis, EF 65-70%, grade 1 diastolic dysfunction.  BNP elevated.  Cardiology consulted-Recent stress test negative for ischemia. Possible mild contribution from diastolic CHF.  Patient started on nebs, oxygen, IV steroids and ABX's. Able to wean off Bipap and currently  doing well on NC.  Transfer out of ICU on 04/16/24 and continue current management.  Pulmonary consulted-continue Rocephin/azithromycin for 5 day course, and steroids for 5 day course.  Able to wean off supplemental oxygen at rest.  However, on 6 minute walk test, patient oxygen saturation dropped to 84% on room air.  Requiring 1 L nasal cannula to recover up to 92%.  Patient had to be discharge home with home oxygen.  Of note, she states she has her sisters portable oxygen at home as well that she has been using as needed.  PT/OT consulted-recommend low intensity therapy.    Patient admits feeling significantly better compared to presentation on admission.  States her shortness a breath has improved.  Still has some mild wheezing, but that has also improved.  Cough present.  Denies any chest pain. Pt denies any fever, headaches, vision changes, palpitations, abdominal pain, nausea, vomiting, or any new weaknesses. Feels ready to go home. Patient's exam on discharge was as follow: Patient is alert and oriented, appears in no acute distress, heart with regular rate and rhythm, lungs with some expiratory wheezes but improved, also sound less tight on exam today, abdomen soft, and no new weaknesses or focal deficits seen. Bilateral lower extremities without any edema or calf tenderness.     Patient was counseled regarding any abnormal labs, differential diagnosis, treatment options, risk-benefit, lifestyle changes, prognosis, current condition, and medications. Patient was interactive and attentive.  Patient's questions were answered in a respectful and timely manner. Patient was instructed to follow-up with PCP within 1 week and to continue taking medications as prescribed.  Instructed to also follow up with pulmonology. Also, extensively discussed the risks, benefits, and side effects of patient's medications. Discussed with patient about any medication changes. Patient verbalized understanding and agrees to  treatment plan.  Patient is stable for discharge.  Patient has no other questions or concerns at this time.  ED precautions discussed with the patient.    Vital signs are stable. Ambulating without any difficulty. Tolerating p.o. intake without any nausea or vomiting. Afebrile for over 24 hours. Patient is in stable condition and has no questions or concerns. Patient will be discharge to home with home health and home oxygen once transportation secured . Prescriptions sent to pharmacy.  CM/SW to assist with discharge planning.     Vitals:    04/19/24 0719 04/19/24 1101 04/19/24 1112 04/19/24 1625   BP: (!) 140/72 127/86     BP Location:       Patient Position:       Pulse: 90 102 100 99   Resp: 18 19 18 18   Temp: 98.3 °F (36.8 °C) 97.9 °F (36.6 °C)     TempSrc:       SpO2: (!) 94% (!) 90% (!) 91% (!) 90%   Weight:       Height:                Goals of Care Treatment Preferences:  Code Status: DNR    Living Will: Yes              Consults:   Consults (From admission, onward)          Status Ordering Provider     Inpatient consult to Cardiology  Once        Provider:  Abhijeet Boogie MD    Completed JOE VALENCIA     Inpatient consult to Pulmonology  Once        Provider:  Erik Castle MD    Completed JOE VALENCIA.            No new Assessment & Plan notes have been filed under this hospital service since the last note was generated.  Service: Hospital Medicine    Final Active Diagnoses:    Diagnosis Date Noted POA    PRINCIPAL PROBLEM:  Acute respiratory failure with hypoxia and hypercapnia [J96.01, J96.02] 04/15/2024 Yes    COPD exacerbation [J44.1] 04/15/2024 Yes    Severe aortic stenosis [I35.0] 04/17/2024 Yes    Elevated brain natriuretic peptide (BNP) level [R79.89] 04/15/2024 Yes    Hypertension [I10] 10/12/2015 Yes    Morbid obesity [E66.01] 01/13/2020 Yes    Hypokalemia [E87.6] 04/17/2024 Yes    Leukocytosis [D72.829] 04/17/2024 No    Advanced care planning/counseling discussion [Z71.89]  04/15/2024 Not Applicable      Problems Resolved During this Admission:       Discharged Condition: stable    Disposition: Home or Self Care    Follow Up:   Follow-up Information       Warner Richey MD Follow up in 1 week(s).    Specialties: Internal Medicine, Wound Care  Contact information:  [address] [address]  [phone]               [facility] Follow up on 4/20/2024.    Specialty: Home Health Services  Why: Home Health  Contact information:  [address]  [place] [address]  [phone]               [facility] Follow up.    Specialty: DME Provider  Why: DME  oxygen to bedside and concentrator and rollator to home.  Contact information:  [address]  [facility] [id number] [phone]                           Patient Instructions:      OXYGEN FOR HOME USE     Order Specific Question Answer Comments   Liter Flow 1    Duration Continuous    Qualifying Test Performed at: Activity    Oxygen saturation at rest 89    Oxygen saturation with activity 84    Oxygen saturation with activity on oxygen 92    Portable mode: continuous    Route nasal cannula    Device: home concentrator with portable tanks    Length of need (in months): 99 mos    Patient condition with qualifying saturation COPD    Height: 5' (1.524 m)    Weight: 85 kg (187 lb 6.3 oz)    Alternative treatment measures have been tried or considered and deemed clinically ineffective. Yes      WALKER FOR HOME USE     Order Specific Question Answer Comments   Type of Walker: Rollator with brakes and/or seat    With wheels? Yes    Height: 5' (1.524 m)    Weight: 85 kg (187 lb 6.3 oz)    Length of need (1-99 months): 99    Does patient have medical equipment at home? none    Please check all that apply: Patient's condition impairs ambulation.    Please check all that apply: Patient is unable to safely ambulate without equipment.      BATH/SHOWER CHAIR FOR HOME USE     Order Specific Question Answer  Comments   Height: 5' (1.524 m)    Weight: 85 kg (187 lb 6.3 oz)    Does patient have medical equipment at home? none    Length of need (1-99 months): 99    Type: With back      Ambulatory referral/consult to Pulmonology   Standing Status: Future   Referral Priority: Routine Referral Type: Consultation   Referral Reason: Specialty Services Required   Requested Specialty: Pulmonary Disease   Number of Visits Requested: 1     Ambulatory referral/consult to Pulmonary Rehab   Standing Status: Future   Referral Priority: Routine Referral Type: Consultation   Referral Reason: Specialty Services Required   Requested Specialty: Pulmonary Disease   Number of Visits Requested: 1     Diet Cardiac     Activity as tolerated       Significant Diagnostic Studies: Labs: All labs within the past 24 hours have been reviewed      Recent Results (from the past 100 hour(s))   EKG 12-lead    Collection Time: 04/15/24  1:43 PM   Result Value Ref Range    QRS Duration 120 ms    [facility] QTC Calculation 495 ms   CBC auto differential    Collection Time: 04/15/24  1:49 PM   Result Value Ref Range    WBC 9.09 3.90 - 12.70 K/uL    RBC 5.81 (H) 4.00 - 5.40 M/uL    Hemoglobin 16.3 (H) 12.0 - 16.0 g/dL    Hematocrit 50.4 (H) 37.0 - 48.5 %    MCV 87 82 - 98 fL    MCH 28.1 27.0 - 31.0 pg    MCHC 32.3 32.0 - 36.0 g/dL    RDW 14.1 11.5 - 14.5 %    Platelets 178 150 - 450 K/uL    MPV 12.0 9.2 - 12.9 fL    Immature Granulocytes 0.6 (H) 0.0 - 0.5 %    Gran # (ANC) 8.1 (H) 1.8 - 7.7 K/uL    Immature Grans (Abs) 0.05 (H) 0.00 - 0.04 K/uL    Lymph # 0.7 (L) 1.0 - 4.8 K/uL    Mono # 0.2 (L) 0.3 - 1.0 K/uL    Eos # 0.0 0.0 - 0.5 K/uL    Baso # 0.01 0.00 - 0.20 K/uL    nRBC 0 0 /100 WBC    Gran % 89.1 (H) 38.0 - 73.0 %    Lymph % 7.7 (L) 18.0 - 48.0 %    Mono % 2.4 (L) 4.0 - 15.0 %    Eosinophil % 0.1 0.0 - 8.0 %    Basophil % 0.1 0.0 - 1.9 %    Differential Method Automated    Comprehensive metabolic panel    Collection Time: 04/15/24  1:49 PM   Result Value Ref  Range    Sodium 138 136 - 145 mmol/L    Potassium 4.2 3.5 - 5.1 mmol/L    Chloride 102 95 - 110 mmol/L    CO2 26 23 - 29 mmol/L    Glucose 141 (H) 70 - 110 mg/dL    BUN 32 (H) 8 - 23 mg/dL    Creatinine 1.1 0.5 - 1.4 mg/dL    Calcium 10.1 8.7 - 10.5 mg/dL    Total Protein 7.9 6.0 - 8.4 g/dL    Albumin 3.7 3.5 - 5.2 g/dL    Total Bilirubin 0.7 0.1 - 1.0 mg/dL    Alkaline Phosphatase 59 55 - 135 U/L    AST 26 10 - 40 U/L    ALT 27 10 - 44 U/L    eGFR 54 (A) >60 mL/min/1.73 m^2    Anion Gap 10 8 - 16 mmol/L   Troponin I    Collection Time: 04/15/24  1:49 PM   Result Value Ref Range    Troponin I 0.015 0.000 - 0.026 ng/mL   Brain natriuretic peptide    Collection Time: 04/15/24  1:49 PM   Result Value Ref Range     (H) 0 - 99 pg/mL   Magnesium    Collection Time: 04/15/24  1:49 PM   Result Value Ref Range    Magnesium 1.8 1.6 - 2.6 mg/dL   ISTAT PROCEDURE    Collection Time: 04/15/24  2:45 PM   Result Value Ref Range    POC PH 7.353 7.35 - 7.45    POC PCO2 53.2 (H) 35 - 45 mmHg    POC PO2 41 40 - 60 mmHg    POC HCO3 29.5 (H) 24 - 28 mmol/L    POC BE 3 (H) -2 to 2 mmol/L    POC SATURATED O2 72 95 - 100 %    POC TCO2 31 (H) 24 - 29 mmol/L    Sample VENOUS     Site Other     Allens Test N/A     DelSys Nasal Can    Blood Culture #1 **CANNOT BE ORDERED STAT**    Collection Time: 04/15/24  3:33 PM    Specimen: Peripheral, Antecubital, Right; Blood   Result Value Ref Range    Blood Culture, Routine No Growth to date     Blood Culture, Routine No Growth to date     Blood Culture, Routine No Growth to date     Blood Culture, Routine No Growth to date    Blood Culture #2 **CANNOT BE ORDERED STAT**    Collection Time: 04/15/24  3:33 PM    Specimen: Peripheral, Forearm, Left; Blood   Result Value Ref Range    Blood Culture, Routine No Growth to date     Blood Culture, Routine No Growth to date     Blood Culture, Routine No Growth to date     Blood Culture, Routine No Growth to date    Lactic acid, plasma    Collection Time:  04/15/24  3:33 PM   Result Value Ref Range    Lactate (Lactic Acid) 2.6 (H) 0.5 - 2.2 mmol/L   ISTAT PROCEDURE    Collection Time: 04/15/24  3:40 PM   Result Value Ref Range    POC PH 7.295 (L) 7.35 - 7.45    POC PCO2 61.6 (H) 35 - 45 mmHg    POC PO2 28 (LL) 40 - 60 mmHg    POC HCO3 30.0 (H) 24 - 28 mmol/L    POC BE 1 -2 to 2 mmol/L    POC SATURATED O2 43 95 - 100 %    POC TCO2 32 (H) 24 - 29 mmol/L    Sample VENOUS     Site Other     Allens Test N/A     DelSys Nasal Can    RESPIRATORY PATHOGENS PANEL (TEM-PCR) Nasopharyngeal Swab    Collection Time: 04/15/24  4:41 PM   Result Value Ref Range    Respiratory Virus Panel, source Nasophar Swa     TEM - Acinetobacter baumannii Not Detected Not Detected    TEM - Bordetella pertussis Not Detected Not Detected    TEM - Chlamydophila pneumoniae Not Detected Not Detected    TEM- Haemophilus influenzae Not Detected Not Detected    TEM- Haemophilus influenzae B Not Detected Not Detected    TEM - Klebsiella pneumoniae Not Detected Not Detected    Legionella pneumophila Not Detected Not Detected    TEM - Staphylococcus aureus Not Detected Not Detected    TEM - MRSA Not Detected Not Detected    TEM - Jia -Sherman Not Detected Not Detected    TEM - Mycoplasma pneumoniae Not Detected Not Detected    TEM - Neisseria meningiditis Not Detected Not Detected    TEM - Pseudomonas aeruginosa Not Detected Not Detected    TEM - Streptococcus pneumoniae Not Detected Not Detected    TEM - Streptococcus pyogenes A Not Detected Not Detected    Moraxella catarrhalis Not Detected Not Detected    RVP - Adenovirus Not Detected Not Detected    Enterovirus/Rhinovirus Not Detected Not Detected    Human Bocavirus Not Detected Not Detected    Human Coronavirus, Common Cold Virus Not Detected Not Detected    RVP - Human Metapneumovirus (hMPV) Not Detected Not Detected    RVP - Influenza A Not Detected Not Detected    Influenza A - [id number] Not Detected Not Detected    RVP - Influenza B Not Detected  Not Detected    Parainfluenza Not Detected Not Detected    Respiratory Syncytial VirusVirus (RSV) A Not Detected Not Detected   ISTAT PROCEDURE    Collection Time: 04/15/24  5:00 PM   Result Value Ref Range    POC PH 7.326 (L) 7.35 - 7.45    POC PCO2 52.1 (H) 35 - 45 mmHg    POC PO2 53 40 - 60 mmHg    POC HCO3 27.2 24 - 28 mmol/L    POC BE 0 -2 to 2 mmol/L    POC SATURATED O2 84 95 - 100 %    POC TCO2 29 24 - 29 mmol/L    Rate 18     Sample VENOUS     Site Other     Allens Test N/A     DelSys CPAP/BiPAP     Mode BiPAP     FiO2 30     Spont Rate 36     IP 10     EP 5    POCT Influenza A/B Molecular    Collection Time: 04/15/24  6:21 PM   Result Value Ref Range    POC Molecular Influenza A Ag Negative Negative    POC Molecular Influenza B Ag Negative Negative     Acceptable Yes    POCT COVID-19 Rapid Screening    Collection Time: 04/15/24  6:22 PM   Result Value Ref Range    POC Rapid COVID Negative Negative     Acceptable Yes    Basic metabolic panel    Collection Time: 04/16/24  3:49 AM   Result Value Ref Range    Sodium 138 136 - 145 mmol/L    Potassium 3.3 (L) 3.5 - 5.1 mmol/L    Chloride 97 95 - 110 mmol/L    CO2 27 23 - 29 mmol/L    Glucose 137 (H) 70 - 110 mg/dL    BUN 36 (H) 8 - 23 mg/dL    Creatinine 1.2 0.5 - 1.4 mg/dL    Calcium 9.1 8.7 - 10.5 mg/dL    Anion Gap 14 8 - 16 mmol/L    eGFR 49 (A) >60 mL/min/1.73 m^2   Magnesium    Collection Time: 04/16/24  3:49 AM   Result Value Ref Range    Magnesium 2.1 1.6 - 2.6 mg/dL   Phosphorus    Collection Time: 04/16/24  3:49 AM   Result Value Ref Range    Phosphorus 4.1 2.7 - 4.5 mg/dL   CBC auto differential    Collection Time: 04/16/24  3:50 AM   Result Value Ref Range    WBC 9.72 3.90 - 12.70 K/uL    RBC 5.25 4.00 - 5.40 M/uL    Hemoglobin 14.5 12.0 - 16.0 g/dL    Hematocrit 44.8 37.0 - 48.5 %    MCV 85 82 - 98 fL    MCH 27.6 27.0 - 31.0 pg    MCHC 32.4 32.0 - 36.0 g/dL    RDW 14.1 11.5 - 14.5 %    Platelets 173 150 - 450 K/uL    MPV  11.5 9.2 - 12.9 fL    Immature Granulocytes 0.7 (H) 0.0 - 0.5 %    Gran # (ANC) 8.3 (H) 1.8 - 7.7 K/uL    Immature Grans (Abs) 0.07 (H) 0.00 - 0.04 K/uL    Lymph # 0.8 (L) 1.0 - 4.8 K/uL    Mono # 0.6 0.3 - 1.0 K/uL    Eos # 0.0 0.0 - 0.5 K/uL    Baso # 0.01 0.00 - 0.20 K/uL    nRBC 0 0 /100 WBC    Gran % 85.3 (H) 38.0 - 73.0 %    Lymph % 7.8 (L) 18.0 - 48.0 %    Mono % 6.1 4.0 - 15.0 %    Eosinophil % 0.0 0.0 - 8.0 %    Basophil % 0.1 0.0 - 1.9 %    Differential Method Automated    Echo    Collection Time: 04/16/24 11:01 AM   Result Value Ref Range    BSA 1.9 m2    LVOT stroke volume 72.07 cm3    LVIDd 4.57 3.5 - 6.0 cm    LV Systolic Volume 27.90 mL    LV Systolic Volume Index 15.3 mL/m2    LVIDs 2.74 2.1 - 4.0 cm    LV Diastolic Volume 95.87 mL    LV Diastolic Volume Index 52.68 mL/m2    IVS 0.72 0.6 - 1.1 cm    LVOT diameter 1.96 cm    LVOT area 3.0 cm2    FS 40 28 - 44 %    Left Ventricle Relative Wall Thickness 0.43 cm    Posterior Wall 0.99 0.6 - 1.1 cm    LV mass 127.25 g    LV Mass Index 70 g/m2    MV Peak E Peter 0.76 m/s    TDI LATERAL 0.07 m/s    TDI SEPTAL 0.06 m/s    E/E' ratio 11.69 m/s    MV Peak A Peter 1.11 m/s    TR Max Peter 1.96 m/s    E/A ratio 0.68     E wave deceleration time 287.48 msec    LV SEPTAL E/E' RATIO 12.67 m/s    LV LATERAL E/E' RATIO 10.86 m/s    LVOT peak peter 1.08 m/s    Left Ventricular Outflow Tract Mean Velocity 0.83 cm/s    Left Ventricular Outflow Tract Mean Gradient 2.88 mmHg    RVDD 3.29 cm    RV S' 6.35 cm/s    TAPSE 1.61 cm    LA size 2.64 cm    Left Atrium Minor Axis 4.75 cm    Left Atrium Major Axis 4.36 cm    RA Major Axis 3.38 cm    AV regurgitation pressure 1/2 time [id number] ms    AR Max Peter 4.27 m/s    AV mean gradient 28 mmHg    AV peak gradient 50 mmHg    Ao peak peter 3.52 m/s    Ao VTI 75.10 cm    LVOT peak VTI 23.90 cm    AV valve area 0.96 cm²    AV Velocity Ratio 0.31     AV index (prosthetic) 0.32     LEAH by Velocity Ratio 0.93 cm²    MV stenosis  pressure 1/2 time 83.37 ms    MV valve area p 1/2 method 2.64 cm2    Triscuspid Valve Regurgitation Peak Gradient 15 mmHg    Sinus 3.08 cm    STJ 2.63 cm    IVC diameter 1.46 cm    Mean e' 0.07 m/s    ZLVIDS -1.01     ZLVIDD -0.98     LA Volume Index 23.0 mL/m2    LA volume 41.83 cm3    LA WIDTH 4.1 cm    RA Width 3.0 cm    TV resting pulmonary artery pressure 18 mmHg    RV TB RVSP 5 mmHg    Est. RA pres 3 mmHg   Basic metabolic panel    Collection Time: 04/17/24  4:26 AM   Result Value Ref Range    Sodium 138 136 - 145 mmol/L    Potassium 4.0 3.5 - 5.1 mmol/L    Chloride 99 95 - 110 mmol/L    CO2 27 23 - 29 mmol/L    Glucose 89 70 - 110 mg/dL    BUN 42 (H) 8 - 23 mg/dL    Creatinine 1.1 0.5 - 1.4 mg/dL    Calcium 9.4 8.7 - 10.5 mg/dL    Anion Gap 12 8 - 16 mmol/L    eGFR 54 (A) >60 mL/min/1.73 m^2   Magnesium    Collection Time: 04/17/24  4:26 AM   Result Value Ref Range    Magnesium 2.3 1.6 - 2.6 mg/dL   Phosphorus    Collection Time: 04/17/24  4:26 AM   Result Value Ref Range    Phosphorus 3.4 2.7 - 4.5 mg/dL   CBC auto differential    Collection Time: 04/17/24  4:26 AM   Result Value Ref Range    WBC 17.60 (H) 3.90 - 12.70 K/uL    RBC 5.52 (H) 4.00 - 5.40 M/uL    Hemoglobin 15.5 12.0 - 16.0 g/dL    Hematocrit 48.0 37.0 - 48.5 %    MCV 87 82 - 98 fL    MCH 28.1 27.0 - 31.0 pg    MCHC 32.3 32.0 - 36.0 g/dL    RDW 14.2 11.5 - 14.5 %    Platelets 214 150 - 450 K/uL    MPV 12.3 9.2 - 12.9 fL    Immature Granulocytes 0.9 (H) 0.0 - 0.5 %    Gran # (ANC) 14.6 (H) 1.8 - 7.7 K/uL    Immature Grans (Abs) 0.15 (H) 0.00 - 0.04 K/uL    Lymph # 1.3 1.0 - 4.8 K/uL    Mono # 1.6 (H) 0.3 - 1.0 K/uL    Eos # 0.0 0.0 - 0.5 K/uL    Baso # 0.03 0.00 - 0.20 K/uL    nRBC 0 0 /100 WBC    Gran % 82.5 (H) 38.0 - 73.0 %    Lymph % 7.4 (L) 18.0 - 48.0 %    Mono % 8.9 4.0 - 15.0 %    Eosinophil % 0.1 0.0 - 8.0 %    Basophil % 0.2 0.0 - 1.9 %    Differential Method Automated    Lactic Acid, Plasma    Collection Time: 04/17/24  6:55 AM    Result Value Ref Range    Lactate (Lactic Acid) 1.3 0.5 - 2.2 mmol/L   Basic metabolic panel    Collection Time: 04/18/24  4:05 AM   Result Value Ref Range    Sodium 138 136 - 145 mmol/L    Potassium 4.9 3.5 - 5.1 mmol/L    Chloride 98 95 - 110 mmol/L    CO2 31 (H) 23 - 29 mmol/L    Glucose 87 70 - 110 mg/dL    BUN 39 (H) 8 - 23 mg/dL    Creatinine 1.1 0.5 - 1.4 mg/dL    Calcium 9.6 8.7 - 10.5 mg/dL    Anion Gap 9 8 - 16 mmol/L    eGFR 54 (A) >60 mL/min/1.73 m^2   Magnesium    Collection Time: 04/18/24  4:05 AM   Result Value Ref Range    Magnesium 2.4 1.6 - 2.6 mg/dL   Phosphorus    Collection Time: 04/18/24  4:05 AM   Result Value Ref Range    Phosphorus 4.1 2.7 - 4.5 mg/dL   CBC auto differential    Collection Time: 04/18/24  4:05 AM   Result Value Ref Range    WBC 12.47 3.90 - 12.70 K/uL    RBC 5.38 4.00 - 5.40 M/uL    Hemoglobin 15.1 12.0 - 16.0 g/dL    Hematocrit 48.3 37.0 - 48.5 %    MCV 90 82 - 98 fL    MCH 28.1 27.0 - 31.0 pg    MCHC 31.3 (L) 32.0 - 36.0 g/dL    RDW 14.4 11.5 - 14.5 %    Platelets 189 150 - 450 K/uL    MPV 12.0 9.2 - 12.9 fL    Immature Granulocytes 1.5 (H) 0.0 - 0.5 %    Gran # (ANC) 9.8 (H) 1.8 - 7.7 K/uL    Immature Grans (Abs) 0.19 (H) 0.00 - 0.04 K/uL    Lymph # 1.2 1.0 - 4.8 K/uL    Mono # 1.2 (H) 0.3 - 1.0 K/uL    Eos # 0.0 0.0 - 0.5 K/uL    Baso # 0.04 0.00 - 0.20 K/uL    nRBC 0 0 /100 WBC    Gran % 79.0 (H) 38.0 - 73.0 %    Lymph % 9.5 (L) 18.0 - 48.0 %    Mono % 9.6 4.0 - 15.0 %    Eosinophil % 0.1 0.0 - 8.0 %    Basophil % 0.3 0.0 - 1.9 %    Differential Method Automated    Basic metabolic panel    Collection Time: 04/19/24  4:36 AM   Result Value Ref Range    Sodium 141 136 - 145 mmol/L    Potassium 5.2 (H) 3.5 - 5.1 mmol/L    Chloride 100 95 - 110 mmol/L    CO2 30 (H) 23 - 29 mmol/L    Glucose 94 70 - 110 mg/dL    BUN 46 (H) 8 - 23 mg/dL    Creatinine 1.1 0.5 - 1.4 mg/dL    Calcium 9.8 8.7 - 10.5 mg/dL    Anion Gap 11 8 - 16 mmol/L    eGFR 54 (A) >60 mL/min/1.73 m^2    Magnesium    Collection Time: 04/19/24  4:36 AM   Result Value Ref Range    Magnesium 2.2 1.6 - 2.6 mg/dL   Phosphorus    Collection Time: 04/19/24  4:36 AM   Result Value Ref Range    Phosphorus 4.1 2.7 - 4.5 mg/dL       Microbiology Results (last 7 days)       Procedure Component Value Units Date/Time    Blood Culture #1 **CANNOT BE ORDERED STAT** [[id number]] Collected: 04/15/24 1533    Order Status: Completed Specimen: Blood from Peripheral, Antecubital, Right Updated: 04/18/24 1903     Blood Culture, Routine No Growth to date      No Growth to date      No Growth to date      No Growth to date    Blood Culture #2 **CANNOT BE ORDERED STAT** [[id number]] Collected: 04/15/24 1533    Order Status: Completed Specimen: Blood from Peripheral, Forearm, Left Updated: 04/18/24 1903     Blood Culture, Routine No Growth to date      No Growth to date      No Growth to date      No Growth to date    Respiratory Infection Panel (PCR), Nasopharyngeal [[id number]]     Order Status: Canceled Specimen: Nasopharyngeal Swab     Influenza A & B by Molecular [[id number]]     Order Status: Canceled Specimen: Nasopharyngeal Swab             Imaging Results              X-Ray Chest AP Portable (Final result)  Result time 04/15/24 14:32:38      Final result by John Downs MD (04/15/24 14:32:38)                   Impression:      As above      Electronically signed by: John Downs MD  Date:    04/15/2024  Time:    14:32               Narrative:    EXAMINATION:  XR CHEST AP PORTABLE    CLINICAL HISTORY:  Dyspnea, unspecified    TECHNIQUE:  AP portable radiograph of the chest was performed.    COMPARISON:  09/26/2023    FINDINGS:  Multiple overlying cardiac monitoring leads. The cardiomediastinal silhouette is normal in size and midline.  Atherosclerotic calcification of the aortic arch.  Pulmonary vascularity appears within normal limits.    The lungs appear symmetrically expanded.  Few coarse interstitial markings, without  confluent pulmonary parenchymal opacity. No pleural fluid or pneumothorax.                                         Pending Diagnostic Studies:       None           Medications:  Reconciled Home Medications:      Medication List        CONTINUE taking these medications      albuterol 90 mcg/actuation inhaler  Commonly known as: PROVENTIL/VENTOLIN HFA  INHALE 2 PUFF INTO LUNGS EVERY 6 HOURS AS NEEDED FOR WHEEZING     aspirin 81 MG EC tablet  Commonly known as: ECOTRIN  Take 81 mg by mouth once daily.     atorvastatin 20 MG tablet  Commonly known as: LIPITOR  Take 1 tablet (20 mg total) by mouth every evening.     doxycycline 100 MG tablet  Commonly known as: VIBRA-TABS  Take 1 tablet (100 mg total) by mouth 2 (two) times daily. for 1 day  Start taking on: April 20, 2024     fluticasone-umeclidin-vilanter 100-62.5-25 mcg Dsdv  Commonly known as: TRELEGY ELLIPTA  Inhale 1 puff into the lungs once daily.     lisinopriL-hydrochlorothiazide 20-25 mg Tab  Commonly known as: PRINZIDE,ZESTORETIC  TAKE 1 TABLET BY MOUTH EVERY DAY     predniSONE 20 MG tablet  Commonly known as: DELTASONE  Take 2 tablets (40 mg total) by mouth once daily. for 1 day  Start taking on: April 20, 2024     ZYRTEC ORAL  Take by mouth.              Indwelling Lines/Drains at time of discharge:   Lines/Drains/Airways       Drain  Duration                  Closed/Suction Drain 10/03/23 1230 Tube - 1 Right Neck Bulb 10 Fr. 199 days    Female External Urinary Catheter w/ Suction 04/15/24 1730 3 days                    Time spent on the discharge of patient: Greater than 35 minutes         Ksenia Restrepo DO  Department of Hospital Medicine  [facility] [place] [facility]

## 2024-04-19 NOTE — PROGRESS NOTES
Rolling Walker/Walker needed:    The mobility limitation cannot be sufficiently resolved by the use of a cane.   Patient's functional mobility deficit can be sufficiently resolved with the use of a (Rolling Walker or Walker).  Patient's mobility limitation significantly impairs their ability to participate in one of more activities of daily living.  The use of a (Rolling Walker or Walker) will significantly improve the patient's ability to participate in MRADLS and the patient will use it on regular basis in the home

## 2024-04-19 NOTE — PLAN OF CARE
[facility]      HOME HEALTH ORDERS  FACE TO FACE ENCOUNTER    Patient Name: Chanel Esparza  YOB: 1955    PCP: Warner Richey MD   PCP Address: [address]  PCP Phone Number: [phone]  PCP Fax: [phone]    Encounter Date: 4/15/24    Admit to Home Health    Diagnoses:  Active Hospital Problems    Diagnosis  POA    *Acute respiratory failure with hypoxia and hypercapnia [J96.01, J96.02]  Yes     Priority: 1 - High    COPD exacerbation [J44.1]  Yes     Priority: 2     Severe aortic stenosis [I35.0]  Yes     Priority: 3     Elevated brain natriuretic peptide (BNP) level [R79.89]  Yes     Priority: 4     Hypertension [I10]  Yes     Priority: 4     Morbid obesity [E66.01]  Yes     Priority: 5     Hypokalemia [E87.6]  Yes     Priority: 6     Leukocytosis [D72.829]  No     Priority: 7     Advanced care planning/counseling discussion [Z71.89]  Not Applicable     Priority: 8       Resolved Hospital Problems   No resolved problems to display.       Follow Up Appointments:  Future Appointments   Date Time Provider Department Center   4/26/2024  8:00 AM Sherman Alvarado MD [facility] CARDIO [facility]   6/24/2024  3:45 PM Lyudmila Barrera MD [facility] ENT [facility]   6/27/2024  9:40 AM Warner Richey [facility]       Allergies:Review of patient's allergies indicates:  No Known Allergies    Medications: Review discharge medications with patient and family and provide education.    Current Facility-Administered Medications   Medication Dose Route Frequency Provider Last Rate Last Admin    acetaminophen tablet 650 mg  650 mg Oral Q4H PRN Corky Tovar MD        albuterol-ipratropium 2.5 mg-0.5 mg/3 mL nebulizer solution 3 mL  3 mL Nebulization Q4H [facility] Cathryn Restrepo-Miri GARCIA, DO   3 mL at 04/19/24 1112    aspirin EC tablet 81 mg  81 mg Oral Daily Corky Tovar MD   81 mg at 04/19/24 0829    atorvastatin tablet 20 mg  20 mg Oral QHS Corky Tovar MD    20 mg at 04/18/24 2042    cefTRIAXone (ROCEPHIN) 2 g in dextrose 5 % in water (D5W) 100 mL IVPB (MB+)  2 g Intravenous Q24H Corky Tovar MD   Stopped at 04/19/24 0036    enoxaparin injection 40 mg  40 mg Subcutaneous Daily Corky Tovar MD   40 mg at 04/18/24 1602    hydrALAZINE injection 10 mg  10 mg Intravenous Q8H PRN Corky Tovar MD   10 mg at 04/16/24 2203    hydroCHLOROthiazide tablet 25 mg  25 mg Oral Daily Ksenia Restrepo., DO   25 mg at 04/19/24 0829    lisinopriL tablet 20 mg  20 mg Oral Daily Ksenia Restrepo, DO   20 mg at 04/19/24 0829    methylPREDNISolone sodium succinate injection 40 mg  40 mg Intravenous Daily Corky Tovar MD   40 mg at 04/19/24 0829    miconazole NITRATE 2 % top powder   Topical (Top) BID Corky Tovar MD   Given at 04/19/24 0833    mupirocin 2 % ointment   Nasal BID Corky Tovar MD   Given at 04/19/24 0834    ondansetron injection 4 mg  4 mg Intravenous Q12H PRN Corky Tovar MD        sodium chloride 0.9% flush 3 mL  3 mL Intravenous PRN Corky Tovar MD         Current Discharge Medication List        CONTINUE these medications which have CHANGED    Details   doxycycline (VIBRA-TABS) 100 MG tablet Take 1 tablet (100 mg total) by mouth 2 (two) times daily. for 1 day  Qty: 2 tablet, Refills: 0    Associated Diagnoses: COPD with acute exacerbation      predniSONE (DELTASONE) 20 MG tablet Take 2 tablets (40 mg total) by mouth once daily. for 1 day  Qty: 2 tablet, Refills: 0           CONTINUE these medications which have NOT CHANGED    Details   albuterol (PROVENTIL/VENTOLIN HFA) 90 mcg/actuation inhaler INHALE 2 PUFF INTO LUNGS EVERY 6 HOURS AS NEEDED FOR WHEEZING  Qty: 25.5 g, Refills: 3    Associated Diagnoses: Chronic bronchitis, unspecified chronic bronchitis type      aspirin (ECOTRIN) 81 MG EC tablet Take 81 mg by mouth once daily.    Associated Diagnoses: Essential hypertension      atorvastatin (LIPITOR) 20 MG tablet Take 1  tablet (20 mg total) by mouth every evening.  Qty: 90 tablet, Refills: 3      CETIRIZINE HCL (ZYRTEC ORAL) Take by mouth.      fluticasone-umeclidin-vilanter (TRELEGY ELLIPTA) 100-62.5-25 mcg DsDv Inhale 1 puff into the lungs once daily.  Qty: 180 each, Refills: 3    Associated Diagnoses: Chronic bronchitis, unspecified chronic bronchitis type      lisinopriL-hydrochlorothiazide (PRINZIDE,ZESTORETIC) 20-25 mg Tab TAKE 1 TABLET BY MOUTH EVERY DAY  Qty: 90 tablet, Refills: 3    Comments: .  Associated Diagnoses: Essential hypertension               I have seen and examined this patient within the last 30 days. My clinical findings that support the need for the home health skilled services and home bound status are the following:no   Weakness/numbness causing balance and gait disturbance due to Weakness/Debility making it taxing to leave home.     Diet:   cardiac diet        Referrals/ Consults  Physical Therapy to evaluate and treat. Evaluate for home safety and equipment needs; Establish/upgrade home exercise program. Perform / instruct on therapeutic exercises, gait training, transfer training, and Range of Motion.  Occupational Therapy to evaluate and treat. Evaluate home environment for safety and equipment needs. Perform/Instruct on transfers, ADL training, ROM, and therapeutic exercises.    Activities:   activity as tolerated    Nursing:   Agency to admit patient within 24 hours of hospital discharge unless specified on physician order or at patient request    SN to complete comprehensive assessment including routine vital signs. Instruct on disease process and s/s of complications to report to MD. Review/verify medication list sent home with the patient at time of discharge  and instruct patient/caregiver as needed. Frequency may be adjusted depending on start of care date.     Skilled nurse to perform up to 3 visits PRN for symptoms related to diagnosis    Notify MD if SBP > 160 or < 90; DBP > 90 or < 50; HR >  120 or < 50; Temp > 101; O2 < 88%; Other:     Ok to schedule additional visits based on staff availability and patient request on consecutive days within the home health episode.    When multiple disciplines ordered:    Start of Care occurs on Sunday - Wednesday schedule remaining discipline evaluations as ordered on separate consecutive days following the start of care.    Thursday SOC -schedule subsequent evaluations Friday and Monday the following week.     Friday - Saturday SOC - schedule subsequent discipline evaluations on consecutive days starting Monday of the following week.    For all post-discharge communication and subsequent orders please contact patient's primary care physician.     Miscellaneous   Home Oxygen:  Oxygen at 1L L/min nasal canula to be used:  Continuously., Assess oxygen saturation via pulse oximeter as needed for increase in SOB., and Notify physician if oxygen saturation less than 88%    Home Health Aide:  Physical Therapy Three times weekly and Occupational Therapy Three times weekly    Wound Care Orders  no    I certify that this patient is confined to her home and needs physical therapy and occupational therapy.

## 2024-04-19 NOTE — PLAN OF CARE
Problem: Occupational Therapy  Goal: Occupational Therapy Goal  Description: Goals to be met by: 5/3/2024     Patient will increase functional independence with ADLs by performing:    UE Dressing with Modified Dawson including clothing retrieval with 2 seated rest breaks.  LE Dressing with Modified [place] including clothing retrieval with 2 seated rest breaks.  Grooming while standing at sink with Modified Dawson.  Toileting from toilet with Modified Dawson for hygiene and clothing management.   Bathing from  shower chair/bench with Modified Dawson with 4 seated rest breaks and good self-initiation of deep breathing technique.  Toilet transfer to toilet with Modified Dawson.    Outcome: Ongoing, Progressing     Initial OT eval/treat complete.  No DME in use PTA.  Needs shower chair.  Anticipate need for rollator though will defer AD needs to PT.  Will also defer O2 needs to RT and MD.  Recommend post acute Low Intensity therapy.  Lives alone though sister visits for outings throughout the week.  To benefit from continued acute care OT services to increase independence in self-care/functional transfers.  OT to follow.

## 2024-04-22 ENCOUNTER — PATIENT OUTREACH (OUTPATIENT)
Dept: ADMINISTRATIVE | Facility: CLINIC | Age: 69
End: 2024-04-22
Payer: MEDICARE

## 2024-04-22 NOTE — PROGRESS NOTES
C3 nurse attempted to contact Chanel Esparza for a TCC post hospital discharge follow up call. No answer. Left voicemail with callback information. The patient has a scheduled HOSFU appointment with Warner Richey MD on 05/07/2024 @ [id number].

## 2024-04-22 NOTE — PROGRESS NOTES
C3 nurse spoke with Chanel Esparza for a TCC post hospital discharge follow up call. The patient has a scheduled [facility] appointment with Warner Richey MD on 05/07/2024 @ [id number].

## 2024-04-26 ENCOUNTER — OFFICE VISIT (OUTPATIENT)
Dept: CARDIOLOGY | Facility: CLINIC | Age: 69
End: 2024-04-26
Payer: MEDICARE

## 2024-04-26 VITALS
SYSTOLIC BLOOD PRESSURE: 118 MMHG | WEIGHT: 180.75 LBS | HEART RATE: 79 BPM | HEIGHT: 60 IN | OXYGEN SATURATION: 96 % | DIASTOLIC BLOOD PRESSURE: 72 MMHG | BODY MASS INDEX: 35.49 KG/M2 | RESPIRATION RATE: 18 BRPM

## 2024-04-26 DIAGNOSIS — R94.31 ABNORMAL EKG: ICD-10-CM

## 2024-04-26 DIAGNOSIS — E78.2 MIXED HYPERLIPIDEMIA: ICD-10-CM

## 2024-04-26 DIAGNOSIS — Z71.89 DNR (DO NOT RESUSCITATE) DISCUSSION: ICD-10-CM

## 2024-04-26 DIAGNOSIS — I65.23 CAROTID ATHEROSCLEROSIS, BILATERAL: ICD-10-CM

## 2024-04-26 DIAGNOSIS — Z87.891 FORMER SMOKER: ICD-10-CM

## 2024-04-26 DIAGNOSIS — E66.9 NON MORBID OBESITY, UNSPECIFIED OBESITY TYPE: ICD-10-CM

## 2024-04-26 DIAGNOSIS — R06.09 DOE (DYSPNEA ON EXERTION): Primary | ICD-10-CM

## 2024-04-26 DIAGNOSIS — I77.1 STENOSIS OF LEFT SUBCLAVIAN ARTERY: ICD-10-CM

## 2024-04-26 DIAGNOSIS — I10 ESSENTIAL HYPERTENSION: ICD-10-CM

## 2024-04-26 DIAGNOSIS — R09.89 LEFT CAROTID BRUIT: ICD-10-CM

## 2024-04-26 DIAGNOSIS — I35.0 NONRHEUMATIC AORTIC VALVE STENOSIS: ICD-10-CM

## 2024-04-26 PROCEDURE — 1111F DSCHRG MED/CURRENT MED MERGE: CPT | Mod: CPTII,S$GLB,, | Performed by: INTERNAL MEDICINE

## 2024-04-26 PROCEDURE — 99999 PR PBB SHADOW E&M-EST. PATIENT-LVL IV: CPT | Mod: PBBFAC,,, | Performed by: INTERNAL MEDICINE

## 2024-04-26 PROCEDURE — 1158F ADVNC CARE PLAN TLK DOCD: CPT | Mod: CPTII,S$GLB,, | Performed by: INTERNAL MEDICINE

## 2024-04-26 PROCEDURE — 1123F ACP DISCUSS/DSCN MKR DOCD: CPT | Mod: CPTII,S$GLB,, | Performed by: INTERNAL MEDICINE

## 2024-04-26 PROCEDURE — 1101F PT FALLS ASSESS-DOCD LE1/YR: CPT | Mod: CPTII,S$GLB,, | Performed by: INTERNAL MEDICINE

## 2024-04-26 PROCEDURE — 3008F BODY MASS INDEX DOCD: CPT | Mod: CPTII,S$GLB,, | Performed by: INTERNAL MEDICINE

## 2024-04-26 PROCEDURE — 3078F DIAST BP <80 MM HG: CPT | Mod: CPTII,S$GLB,, | Performed by: INTERNAL MEDICINE

## 2024-04-26 PROCEDURE — 3288F FALL RISK ASSESSMENT DOCD: CPT | Mod: CPTII,S$GLB,, | Performed by: INTERNAL MEDICINE

## 2024-04-26 PROCEDURE — 1159F MED LIST DOCD IN RCRD: CPT | Mod: CPTII,S$GLB,, | Performed by: INTERNAL MEDICINE

## 2024-04-26 PROCEDURE — 99215 OFFICE O/P EST HI 40 MIN: CPT | Mod: S$GLB,,, | Performed by: INTERNAL MEDICINE

## 2024-04-26 PROCEDURE — 4010F ACE/ARB THERAPY RXD/TAKEN: CPT | Mod: CPTII,S$GLB,, | Performed by: INTERNAL MEDICINE

## 2024-04-26 PROCEDURE — 1160F RVW MEDS BY RX/DR IN RCRD: CPT | Mod: CPTII,S$GLB,, | Performed by: INTERNAL MEDICINE

## 2024-04-26 PROCEDURE — 3074F SYST BP LT 130 MM HG: CPT | Mod: CPTII,S$GLB,, | Performed by: INTERNAL MEDICINE

## 2024-04-26 PROCEDURE — 1126F AMNT PAIN NOTED NONE PRSNT: CPT | Mod: CPTII,S$GLB,, | Performed by: INTERNAL MEDICINE

## 2024-04-26 NOTE — PROGRESS NOTES
CARDIOVASCULAR PROGRESS NOTE    REASON FOR CONSULT:   Chanel Esparza is a 68 y.o. female who presents for f/u AS.    PCP: Kaiden  HISTORY OF PRESENT ILLNESS:   The patient returns for follow up, her son calls in via cell phone for the visit.  She was recently hospitalized in the ICU for an COPD exacerbation.  She tells me she is still short of breath, but is much improved.  Her main complaint is fatigue.  She denies any angina, palpitations, or syncope.  There has been no PND, orthopnea, melena, hematuria, or claudication symptoms.      I reviewed the results of her echocardiogram noting stable moderately severe aortic stenosis with normal LV function.  Her carotid ultrasound notes bilateral carotid atherosclerosis with possible left subclavian stenosis.      Note was made of a DNR status during her hospitalization.  I engaged the patient and her son and a discussion regarding code status.  It is not entirely clear she wishes to remain DNR.  I explained to them that if she is DNR, that takes invasive cardiac procedures off of the table.  They will further discuss amongst themselves and let us know what her code status is.    CARDIOVASCULAR HISTORY:   AS, mod-sev (echo 2024)    Carotid atherosclerosis (CUS 2024)    ?L subclav stenosis (CUS 2024)    PAST MEDICAL HISTORY:     Past Medical History:   Diagnosis Date    Asthma     Emphysema of lung     Hypertension        PAST SURGICAL HISTORY:     Past Surgical History:   Procedure Laterality Date     SECTION      CHOLECYSTECTOMY      DIRECT LARYNGOSCOPY N/A 10/03/2023    Procedure: LARYNGOSCOPY, DIRECT;  Surgeon: Lyudmila Barrera MD;  Location: [facility] OR;  Service: ENT;  Laterality: N/A;    EXCISION OF PAROTID GLAND Right 10/03/2023    Procedure: EXCISION, PAROTID GLAND;  Surgeon: Lyudmila Barrera MD;  Location: [facility] OR;  Service: ENT;  Laterality: Right;  NEUROMONITORING CAITY ANGELO [phone]    RN PREOP 2023---CONSENTS INCOMPLETE    EYE  SURGERY      Cataract Surgery    HYSTERECTOMY      CAPRICE    TONSILLECTOMY      TUBAL LIGATION  1973       ALLERGIES AND MEDICATION:   Review of patient's allergies indicates:  No Known Allergies     Medication List            Accurate as of 2024  7:57 AM. If you have any questions, ask your nurse or doctor.                CONTINUE taking these medications      albuterol 90 mcg/actuation inhaler  Commonly known as: PROVENTIL/VENTOLIN HFA  INHALE 2 PUFF INTO LUNGS EVERY 6 HOURS AS NEEDED FOR WHEEZING     aspirin 81 MG EC tablet  Commonly known as: ECOTRIN     atorvastatin 20 MG tablet  Commonly known as: LIPITOR  Take 1 tablet (20 mg total) by mouth every evening.     fluticasone-umeclidin-vilanter 100-62.5-25 mcg Dsdv  Commonly known as: TRELEGY ELLIPTA  Inhale 1 puff into the lungs once daily.     lisinopriL-hydrochlorothiazide 20-25 mg Tab  Commonly known as: PRINZIDE,ZESTORETIC  TAKE 1 TABLET BY MOUTH EVERY DAY     ZYRTEC ORAL            STOP taking these medications      doxycycline 100 MG tablet  Commonly known as: VIBRA-TABS  Stopped by: Sherman Alvarado MD     predniSONE 20 MG tablet  Commonly known as: DELTASONE  Stopped by: Sherman Alvarado MD              SOCIAL HISTORY:     Social History     Socioeconomic History    Marital status:    Tobacco Use    Smoking status: Some Days     Current packs/day: 0.00     Average packs/day: 1 pack/day for 55.0 years (55.0 ttl pk-yrs)     Types: Cigarettes     Start date: 1968     Last attempt to quit: 3/5/2018     Years since quittin.1    Smokeless tobacco: Never   Substance and Sexual Activity    Alcohol use: No    Drug use: No    Sexual activity: Not Currently     Social Determinants of Health     Financial Resource Strain: High Risk (2024)    Overall Financial Resource Strain (CARDIA)     Difficulty of Paying Living Expenses: Very hard   Food Insecurity: Food Insecurity Present (2024)    Hunger Vital Sign     Worried About  Running Out of Food in the Last Year: Often true     Ran Out of Food in the Last Year: Often true   Transportation Needs: No Transportation Needs (2/14/2024)    PRAPARE - Transportation     Lack of Transportation (Medical): No     Lack of Transportation (Non-Medical): No   Physical Activity: Inactive (2/14/2024)    Exercise Vital Sign     Days of Exercise per Week: 0 days     Minutes of Exercise per Session: 0 min   Stress: No Stress Concern Present (2/14/2024)    [employer] [place] [employer] - Occupational Stress Questionnaire     Feeling of Stress : Only a little   Social Connections: Socially Isolated (4/15/2024)    Social Connection and Isolation Panel [NHANES]     Frequency of Communication with Friends and Family: Twice a week     Frequency of Social Gatherings with Friends and Family: Twice a week     Attends Yarsani Services: Never     Active Member of Clubs or Organizations: No     Attends Club or Organization Meetings: Never     Marital Status:    Housing Stability: Low Risk  (2/14/2024)    Housing Stability Vital Sign     Unable to Pay for Housing in the Last Year: No     Number of Places Lived in the Last Year: 1     Unstable Housing in the Last Year: No       FAMILY HISTORY:     Family History   Problem Relation Name Age of Onset    Hypertension Mother Pat     Diabetes Sister Eli     Diabetes Brother Vincent        REVIEW OF SYSTEMS:   Review of Systems   Constitutional:  Positive for malaise/fatigue. Negative for chills, diaphoresis and fever.   HENT:  Negative for nosebleeds.    Eyes:  Negative for blurred vision, double vision and photophobia.   Respiratory:  Positive for shortness of breath. Negative for hemoptysis and wheezing.    Cardiovascular:  Negative for chest pain, palpitations, orthopnea, claudication, leg swelling and PND.   Gastrointestinal:  Negative for abdominal pain, blood in stool, heartburn, melena, nausea and vomiting.   Genitourinary:  Negative for flank pain  and hematuria.   Musculoskeletal:  Negative for falls, myalgias and neck pain.   Skin:  Negative for rash.   Neurological:  Negative for dizziness, seizures, loss of consciousness, weakness and headaches.   Endo/Heme/Allergies:  Negative for polydipsia. Does not bruise/bleed easily.   Psychiatric/Behavioral:  Negative for depression and memory loss. The patient is not nervous/anxious.        PHYSICAL EXAM:     Vitals:    04/26/24 0753   BP: 118/72   Pulse: 79   Resp: 18    Body mass index is 35.31 kg/m².  Weight: 82 kg (180 lb 12.4 oz)   Height: 5' (152.4 cm)     Physical Exam  Vitals reviewed.   Constitutional:       General: She is not in acute distress.     Appearance: She is well-developed. She is obese. She is not ill-appearing, toxic-appearing or diaphoretic.   HENT:      Head: Normocephalic and atraumatic.   Eyes:      General: No scleral icterus.     Extraocular Movements: Extraocular movements intact.      Conjunctiva/sclera: Conjunctivae normal.      Pupils: Pupils are equal, round, and reactive to light.   Neck:      Thyroid: No thyromegaly.      Vascular: Normal carotid pulses. Carotid bruit present. No JVD.      Trachea: Trachea normal.   Cardiovascular:      Rate and Rhythm: Normal rate and regular rhythm.      Pulses:           Carotid pulses are 2+ on the right side and 2+ on the left side with bruit.     Heart sounds: S1 normal and S2 normal. Heart sounds are distant. Murmur heard.      Systolic murmur is present with a grade of 2/6.      No friction rub. No gallop.   Pulmonary:      Effort: Pulmonary effort is normal. No respiratory distress.      Breath sounds: Normal breath sounds. No stridor. No wheezing, rhonchi or rales.   Chest:      Chest wall: No tenderness.   Abdominal:      General: There is no distension.      Palpations: Abdomen is soft.   Musculoskeletal:         General: No swelling or tenderness. Normal range of motion.      Cervical back: Normal range of motion and neck supple. No  edema or rigidity.      Right lower leg: No edema.      Left lower leg: No edema.   Feet:      Right foot:      Skin integrity: No ulcer.      Left foot:      Skin integrity: No ulcer.   Skin:     General: Skin is dry.      Coloration: Skin is not jaundiced.      Comments: Feet cool/purple discoloration   Neurological:      General: No focal deficit present.      Mental Status: She is alert and oriented to person, place, and time.      Cranial Nerves: No cranial nerve deficit.   Psychiatric:         Mood and Affect: Mood normal.         Speech: Speech normal.         Behavior: Behavior normal. Behavior is cooperative.         DATA:   EKG: (personally reviewed tracing(s))  3/19/24 SR 91, RBBB, diff ST depression ?isch, more prom than 9/26/23    Laboratory:  CBC:  Recent Labs   Lab 04/16/24  0350 04/17/24  0426 04/18/24  0405   WBC 9.72 17.60 H 12.47   Hemoglobin 14.5 15.5 15.1   Hematocrit 44.8 48.0 48.3   Platelets 173 214 189       CHEMISTRIES:  Recent Labs   Lab 07/21/21  1410 08/24/21  0833 12/30/21  0915 04/28/22  0811 09/06/22  0754 04/15/24  1349 04/16/24  0349 04/17/24  0426 04/18/24  0405 04/19/24  0436   Glucose 102 103 103 103   < > 141 H 137 H 89 87 94   Sodium 141 138 135 L 139   < > 138 138 138 138 141   Potassium 3.5 3.7 4.1 3.8   < > 4.2 3.3 L 4.0 4.9 5.2 H   BUN 16 11 14 16   < > 32 H 36 H 42 H 39 H 46 H   Creatinine 1.1 0.8 0.9 1.0   < > 1.1 1.2 1.1 1.1 1.1   eGFR if non African American 52 A >60.0 >60.0 58.0 A  --   --   --   --   --   --    eGFR  --   --   --   --    < > 54 A 49 A 54 A 54 A 54 A   Calcium 10.2 9.7 9.5 9.6   < > 10.1 9.1 9.4 9.6 9.8   Magnesium  --   --   --   --   --  1.8 2.1 2.3 2.4 2.2    < > = values in this interval not displayed.       CARDIAC BIOMARKERS:  Recent Labs   Lab 04/15/24  1349   Troponin I 0.015       COAGS:        LIPIDS/LFTS:  Recent Labs   Lab 04/28/22  0811 09/06/22  0754 05/15/23  0754 09/26/23  1205 02/28/24  1439 04/15/24  1349   Cholesterol 218 H 136  --    --   --   --    Triglycerides 114 66  --   --   --   --    HDL 54 57  --   --   --   --    LDL Cholesterol 141.2 65.8  --   --   --   --    Non-HDL Cholesterol 164 79  --   --   --   --    AST 20 35   < > 22 28 26   ALT 20 33   < > 34 27 27    < > = values in this interval not displayed.       Cardiovascular Testing:  Echo 4/16/24 (similar findings on echo 3/6/24)    Left Ventricle: There is normal systolic function with a visually estimated ejection fraction of 65 - 70%. Grade I diastolic dysfunction.    Right Ventricle: Normal right ventricular cavity size. Systolic function is normal.    Left Atrium: Left atrium is moderately dilated.    Aortic Valve: There is moderate to severe stenosis. Aortic valve area by VTI is 0.96 cm². Aortic valve peak velocity is 3.52 m/s. Mean gradient is 28 mmHg. The dimensionless index is 0.32. There is mild to moderate aortic regurgitation.    Pulmonary Artery: The estimated pulmonary artery systolic pressure is 18 mmHg.    IVC/SVC: Normal venous pressure at 3 mmHg.    L MPI 4/12/24    Normal myocardial perfusion scan. There is no evidence of myocardial ischemia or infarction.    There is a mild intensity perfusion abnormality in the anterior wall of the left ventricle, secondary to breast attenuation.    The gated perfusion images showed an ejection fraction of 61% post stress.    The ECG portion of the study is abnormal but not diagnostic due to resting ST-T abnormalities.    The patient reported no chest pain during the stress test.    There were no arrhythmias during stress.    Carotid US 4/12/24    There is 20-39% right Internal Carotid Stenosis.    There is 20-39% left Internal Carotid Stenosis.    Biphasic left vertebral artery waveforms suggesting possible left subclavian stenosis.      ASSESSMENT:   # WATTS/COPD, improved.  MPI 4/2024 neg.  Recent ICU stay 4/16-19/24 for COPD exac.  # AS, mod-sev (echo 4/2024)  # HTN, controlled  # HLP, on atorva 20mg (intol of atorva 40mg)  #  L car bruit vs transmitted murmur.  MIld bilat carotid atherosclerosis on CUS 4/2024.  # ?L subclav stenosis (CUS 4/2024)  # abnl EKG  # tob abuse, now abstaining  # BMI 37, up 1 unit(s) vs last OV    PLAN:   Cont med rx  Cont ASA 81mg qd  Follow up with Dr. Dai planned in Aug 2024  RTC 6 months with surveillance echo and carotid US (Oct 2024)    10 minute(s) ACP time spent: Goals of care, emotional support, formulating and communicating prognosis, exploring burden/benefits of various approaches of treatment.  The patient and her son are deciding about code status.  She was DNR during her recent hospitalization, but is unclear if she wishes to remain DNR.    Advance Care Planning     Date: 04/26/2024    Today a voluntary meeting took place: Clinic Exam Room    Patient Participation: Patient is able to participate     Attendees (Name and  Relationship to patient):  son    Staff attendees (Name and  Role): [facility], cardiologist    ACP Conversation (General): Understanding of current condition AS     Code Status: Full Code, family to discuss DNR    ACP Documents: None    Goals of care: The patient and family endorses that what is most important right now is to focus on improvement in condition but with limits to invasive therapies    Accordingly, we have decided that the best plan to meet the patient's goals includes continuing with treatment      Recommendations/  Follow-up tasks: Other (specify below) repeat testing in 6 months       Length of ACP   conversation in minutes: 10 minutes             Sherman Alvarado MD, [facility]

## 2024-04-29 ENCOUNTER — TELEPHONE (OUTPATIENT)
Dept: FAMILY MEDICINE | Facility: CLINIC | Age: 69
End: 2024-04-29
Payer: MEDICARE

## 2024-04-30 NOTE — TELEPHONE ENCOUNTER
----- Message from David Julio sent at 4/29/2024 11:02 AM CDT -----  Regarding: self  Type: Patient Call Back    Who called:self    What is the request in detail:pt is calling in regards of her oxygen, she is wanting to send it back. She needs this office to send it to the oxygen place stating she is not on it anymore     Can the clinic reply by [facility]?no    Would the patient rather a call back or a response via [facility]? callback    Best call back number:[phone]    Additional Information:  
Faxed out.  
Patient is requesting to have O2 tank  but needs a letter stating that she is no longer using it from the provider.   
yes
yes

## 2024-05-07 ENCOUNTER — OFFICE VISIT (OUTPATIENT)
Dept: FAMILY MEDICINE | Facility: CLINIC | Age: 69
End: 2024-05-07
Payer: MEDICARE

## 2024-05-07 VITALS
DIASTOLIC BLOOD PRESSURE: 62 MMHG | HEIGHT: 60 IN | TEMPERATURE: 98 F | OXYGEN SATURATION: 95 % | WEIGHT: 193.56 LBS | HEART RATE: 86 BPM | SYSTOLIC BLOOD PRESSURE: 126 MMHG | BODY MASS INDEX: 38 KG/M2

## 2024-05-07 DIAGNOSIS — I10 ESSENTIAL HYPERTENSION: ICD-10-CM

## 2024-05-07 DIAGNOSIS — Z23 NEED FOR PNEUMOCOCCAL VACCINE: ICD-10-CM

## 2024-05-07 DIAGNOSIS — E66.01 MORBID OBESITY: ICD-10-CM

## 2024-05-07 DIAGNOSIS — J41.0 SIMPLE CHRONIC BRONCHITIS: Primary | ICD-10-CM

## 2024-05-07 DIAGNOSIS — I35.0 NONRHEUMATIC AORTIC VALVE STENOSIS: ICD-10-CM

## 2024-05-07 PROCEDURE — 1126F AMNT PAIN NOTED NONE PRSNT: CPT | Mod: CPTII,S$GLB,, | Performed by: INTERNAL MEDICINE

## 2024-05-07 PROCEDURE — 90677 PCV20 VACCINE IM: CPT | Mod: S$GLB,,, | Performed by: INTERNAL MEDICINE

## 2024-05-07 PROCEDURE — 1101F PT FALLS ASSESS-DOCD LE1/YR: CPT | Mod: CPTII,S$GLB,, | Performed by: INTERNAL MEDICINE

## 2024-05-07 PROCEDURE — G0009 ADMIN PNEUMOCOCCAL VACCINE: HCPCS | Mod: S$GLB,,, | Performed by: INTERNAL MEDICINE

## 2024-05-07 PROCEDURE — 3078F DIAST BP <80 MM HG: CPT | Mod: CPTII,S$GLB,, | Performed by: INTERNAL MEDICINE

## 2024-05-07 PROCEDURE — 99214 OFFICE O/P EST MOD 30 MIN: CPT | Mod: S$GLB,,, | Performed by: INTERNAL MEDICINE

## 2024-05-07 PROCEDURE — 1159F MED LIST DOCD IN RCRD: CPT | Mod: CPTII,S$GLB,, | Performed by: INTERNAL MEDICINE

## 2024-05-07 PROCEDURE — 3074F SYST BP LT 130 MM HG: CPT | Mod: CPTII,S$GLB,, | Performed by: INTERNAL MEDICINE

## 2024-05-07 PROCEDURE — 3288F FALL RISK ASSESSMENT DOCD: CPT | Mod: CPTII,S$GLB,, | Performed by: INTERNAL MEDICINE

## 2024-05-07 PROCEDURE — 99999 PR PBB SHADOW E&M-EST. PATIENT-LVL III: CPT | Mod: PBBFAC,,, | Performed by: INTERNAL MEDICINE

## 2024-05-07 PROCEDURE — 4010F ACE/ARB THERAPY RXD/TAKEN: CPT | Mod: CPTII,S$GLB,, | Performed by: INTERNAL MEDICINE

## 2024-05-07 PROCEDURE — 1160F RVW MEDS BY RX/DR IN RCRD: CPT | Mod: CPTII,S$GLB,, | Performed by: INTERNAL MEDICINE

## 2024-05-07 PROCEDURE — 1111F DSCHRG MED/CURRENT MED MERGE: CPT | Mod: CPTII,S$GLB,, | Performed by: INTERNAL MEDICINE

## 2024-05-07 NOTE — PROGRESS NOTES
Subjective:       Patient ID: Chanel Esparza is a 68 y.o. female.    Chief Complaint: Hospital Follow Up (Discontinue home health services)    Hospital follow up    HPI: 67 y/o w/ HTN AS COPD (quit tobacco three weeks ago) presents alone for hospital follow up of COPD exacerbation. Was treated with IV steroids and single dose of IV furosemide. She was discahrged on prednisone taper and with supplemental oxygen. She has completed prednisone and is no longer using oxygen feels breathing back to baseline.       Review of Systems   Constitutional:  Negative for activity change, appetite change, fatigue, fever and unexpected weight change.   HENT:  Negative for ear pain, rhinorrhea and sore throat.    Eyes:  Negative for discharge and visual disturbance.   Respiratory:  Negative for chest tightness, shortness of breath and wheezing.    Cardiovascular:  Negative for chest pain, palpitations and leg swelling.   Gastrointestinal:  Negative for abdominal pain, constipation and diarrhea.   Endocrine: Negative for cold intolerance and heat intolerance.   Genitourinary:  Negative for dysuria and hematuria.   Musculoskeletal:  Negative for joint swelling and neck stiffness.   Skin:  Negative for rash.   Neurological:  Negative for dizziness, syncope, weakness and headaches.   Psychiatric/Behavioral:  Negative for suicidal ideas.        Objective:     Vitals:    05/07/24 1307   BP: 126/62   BP Location: Right arm   Patient Position: Sitting   BP Method: Large (Manual)   Pulse: 86   Temp: 97.9 °F (36.6 °C)   TempSrc: Oral   SpO2: 95%   Weight: 87.8 kg (193 lb 9 oz)   Height: 5' (1.524 m)          Physical Exam  Constitutional:       Appearance: She is well-developed.   HENT:      Head: Normocephalic and atraumatic.   Eyes:      Conjunctiva/sclera: Conjunctivae normal.   Cardiovascular:      Rate and Rhythm: Normal rate and regular rhythm.      Heart sounds:      No friction rub. No gallop.   Pulmonary:      Effort: Pulmonary effort is  normal.      Breath sounds: Normal breath sounds. No wheezing or rales.   Abdominal:      General: Bowel sounds are normal.      Palpations: Abdomen is soft.      Tenderness: There is no abdominal tenderness. There is no guarding or rebound.   Musculoskeletal:         General: No tenderness. Normal range of motion.      Cervical back: Normal range of motion.   Skin:     General: Skin is warm and dry.   Neurological:      Mental Status: She is alert and oriented to person, place, and time.      Cranial Nerves: No cranial nerve deficit.      Transitional Care Note    Family and/or Caretaker present at visit?  No.  Diagnostic tests reviewed/disposition: No diagnosic tests pending after this hospitalization.  Disease/illness education: COPD  Home health/community services discussion/referrals: [facility] no longer needs can discontinue .   Establishment or re-establishment of referral orders for community resources: No other necessary community resources.   Discussion with other health care providers: No discussion with other health care providers necessary.          Assessment and Plan   1. Simple chronic bronchitis  Improved continue maintenance inhaler PCV 20 today  - (VFC) pneumococcocal 20 vaccine (PREVNAR 20) syringe (preferred for >/= 2 months)    2. Nonrheumatic aortic valve stenosis  Stable no intervention planned    3. Essential hypertension  BP at goal    4. Morbid obesity  The patient is asked to make an attempt to improve diet and exercise patterns to aid in medical management of this problem.      5. Need for pneumococcal vaccine  Pcv today  - (VFC) pneumococcocal 20 vaccine (PREVNAR 20) syringe (preferred for >/= 2 months)

## 2024-05-09 ENCOUNTER — DOCUMENT SCAN (OUTPATIENT)
Dept: HOME HEALTH SERVICES | Facility: HOSPITAL | Age: 69
End: 2024-05-09
Payer: MEDICARE

## 2024-05-17 ENCOUNTER — PATIENT OUTREACH (OUTPATIENT)
Dept: ADMINISTRATIVE | Facility: HOSPITAL | Age: 69
End: 2024-05-17
Payer: MEDICARE

## 2024-05-20 ENCOUNTER — PATIENT OUTREACH (OUTPATIENT)
Dept: ADMINISTRATIVE | Facility: OTHER | Age: 69
End: 2024-05-20
Payer: MEDICARE

## 2024-05-20 NOTE — PROGRESS NOTES
CHW - Outreach Attempt    Community Health Worker to attempt to contact patient on: 5/20/24  1st missed follow up visit attempt call.

## 2024-05-30 ENCOUNTER — EXTERNAL HOME HEALTH (OUTPATIENT)
Dept: HOME HEALTH SERVICES | Facility: HOSPITAL | Age: 69
End: 2024-05-30
Payer: MEDICARE

## 2024-06-04 NOTE — PROGRESS NOTES
CHW - Follow Up    This Community Health Worker completed a follow up visit with patient via telephone today.  Pt/Caregiver reported: Patient needs rental assistance.   Community Health Worker provided: mailed referrals to patient for rental assistance, will follow up in one week to check status of referrals and pt's future needs.  Follow up required: yes.  Follow-up Outreach - Due: 6/10/2024

## 2024-06-18 ENCOUNTER — PATIENT OUTREACH (OUTPATIENT)
Dept: ADMINISTRATIVE | Facility: OTHER | Age: 69
End: 2024-06-18

## 2024-06-18 NOTE — PROGRESS NOTES
CHW - Outreach Attempt    Community Health Worker left a voicemail message for 1st attempt to contact patient regardinst missed follow up visit attempt call.

## 2024-06-24 ENCOUNTER — OFFICE VISIT (OUTPATIENT)
Dept: OTOLARYNGOLOGY | Facility: CLINIC | Age: 69
End: 2024-06-24
Payer: MEDICARE

## 2024-06-24 VITALS
HEIGHT: 60 IN | SYSTOLIC BLOOD PRESSURE: 157 MMHG | DIASTOLIC BLOOD PRESSURE: 78 MMHG | BODY MASS INDEX: 35.34 KG/M2 | HEART RATE: 102 BPM | WEIGHT: 180 LBS

## 2024-06-24 DIAGNOSIS — D11.9 WARTHIN TUMOR: ICD-10-CM

## 2024-06-24 DIAGNOSIS — H61.23 EXCESSIVE CERUMEN IN BOTH EAR CANALS: Primary | ICD-10-CM

## 2024-06-24 DIAGNOSIS — Z90.49 H/O PAROTIDECTOMY: ICD-10-CM

## 2024-06-24 DIAGNOSIS — L91.0 HYPERTROPHIC SCAR: ICD-10-CM

## 2024-06-24 PROCEDURE — 1126F AMNT PAIN NOTED NONE PRSNT: CPT | Mod: CPTII,S$GLB,, | Performed by: OTOLARYNGOLOGY

## 2024-06-24 PROCEDURE — 1101F PT FALLS ASSESS-DOCD LE1/YR: CPT | Mod: CPTII,S$GLB,, | Performed by: OTOLARYNGOLOGY

## 2024-06-24 PROCEDURE — 4010F ACE/ARB THERAPY RXD/TAKEN: CPT | Mod: CPTII,S$GLB,, | Performed by: OTOLARYNGOLOGY

## 2024-06-24 PROCEDURE — 1159F MED LIST DOCD IN RCRD: CPT | Mod: CPTII,S$GLB,, | Performed by: OTOLARYNGOLOGY

## 2024-06-24 PROCEDURE — 3078F DIAST BP <80 MM HG: CPT | Mod: CPTII,S$GLB,, | Performed by: OTOLARYNGOLOGY

## 2024-06-24 PROCEDURE — 99213 OFFICE O/P EST LOW 20 MIN: CPT | Mod: S$GLB,,, | Performed by: OTOLARYNGOLOGY

## 2024-06-24 PROCEDURE — 3077F SYST BP >= 140 MM HG: CPT | Mod: CPTII,S$GLB,, | Performed by: OTOLARYNGOLOGY

## 2024-06-24 PROCEDURE — 3288F FALL RISK ASSESSMENT DOCD: CPT | Mod: CPTII,S$GLB,, | Performed by: OTOLARYNGOLOGY

## 2024-06-24 PROCEDURE — 3008F BODY MASS INDEX DOCD: CPT | Mod: CPTII,S$GLB,, | Performed by: OTOLARYNGOLOGY

## 2024-06-24 NOTE — PROGRESS NOTES
OTOLARYNGOLOGY CLINIC NOTE  Date:  2024     Chief complaint:  Chief Complaint   Patient presents with    Follow-up     Ear recheck wax clean out  S/p right parotidectomy        History of Present Illness  Chanel Esparza is a 68 y.o. female  presenting today for a followup.  10-3-23 had partid surgery     Has hypertrophic scar behind the ear that itches intermittently but not too bothersome. Overall ear is feeling better but still numb ear lobe. Canal does not feel swollen really anymore.       Past Medical History  Past Medical History:   Diagnosis Date    Asthma     Emphysema of lung     Hypertension         Past Surgical History  Past Surgical History:   Procedure Laterality Date     SECTION      CHOLECYSTECTOMY      DIRECT LARYNGOSCOPY N/A 10/03/2023    Procedure: LARYNGOSCOPY, DIRECT;  Surgeon: Lyudmila Barrera MD;  Location: [facility] OR;  Service: ENT;  Laterality: N/A;    EXCISION OF PAROTID GLAND Right 10/03/2023    Procedure: EXCISION, PAROTID GLAND;  Surgeon: Lyudmila Barrera MD;  Location: [facility] OR;  Service: ENT;  Laterality: Right;  NEUROMONITORING CAITY ANGELO [phone]    RN PREOP 2023---CONSENTS INCOMPLETE    EYE SURGERY      Cataract Surgery    HYSTERECTOMY      CAPRICE    TONSILLECTOMY      TUBAL LIGATION          Medications  Current Outpatient Medications on File Prior to Visit   Medication Sig Dispense Refill    albuterol (PROVENTIL/VENTOLIN HFA) 90 mcg/actuation inhaler INHALE 2 PUFF INTO LUNGS EVERY 6 HOURS AS NEEDED FOR WHEEZING 25.5 g 3    aspirin (ECOTRIN) 81 MG EC tablet Take 81 mg by mouth once daily.      atorvastatin (LIPITOR) 20 MG tablet Take 1 tablet (20 mg total) by mouth every evening. 90 tablet 3    CETIRIZINE HCL (ZYRTEC ORAL) Take by mouth.      fluticasone-umeclidin-vilanter (TRELEGY ELLIPTA) 100-62.5-25 mcg DsDv Inhale 1 puff into the lungs once daily. 180 each 3    lisinopriL-hydrochlorothiazide (PRINZIDE,ZESTORETIC) 20-25 mg Tab TAKE 1 TABLET BY  MOUTH EVERY DAY 90 tablet 3     No current facility-administered medications on file prior to visit.       Review of Systems  ROS     Social History   reports that she has been smoking cigarettes. She started smoking about 56 years ago. She has a 55 pack-year smoking history. She has never used smokeless tobacco. She reports that she does not drink alcohol and does not use drugs.     Family History  Family History   Problem Relation Name Age of Onset    Hypertension Mother Pat     Diabetes Sister Eli     Diabetes Brother Vincent         Physical Exam   Vitals:    06/24/24 1432   BP: (!) 157/78   Pulse: 102    Body mass index is 35.15 kg/m².  Weight: 81.6 kg (180 lb)   Height: 5' (152.4 cm)     GENERAL: no acute distress.  HEAD: normocephalic.   EYES: No scleral icterus  EARS: external ear without lesion, normal pinna shape and position.  External auditory canal with excess cerumen right more than left cleaned with 7 Latvian suction and operating head otoscope  NOSE: external nose without significant bony abnormality  ORAL CAVITY/OROPHARYNX: tongue mobile.   NECK: trachea midline. Well healed modified med incision with about 1 to 1.5 cm hypertrophic scar postaruicular portion   LYMPH NODES:No cervical lymphadenopathy.  RESPIRATORY: no stridor, no stertor. Voice normal. Respirations nonlabored.  NEURO: alert, responds to questions appropriately.    PSYCH:mood appropriate      Imaging:  The patient does not have any new imaging of the head and neck since last visit.     Labs:  CBC  Recent Labs   Lab 04/16/24  0350 04/17/24  0426 04/18/24  0405   WBC 9.72 17.60 H 12.47   Hemoglobin 14.5 15.5 15.1   Hematocrit 44.8 48.0 48.3   MCV 85 87 90   Platelets 173 214 189     BMP  Recent Labs   Lab 04/17/24  0426 04/18/24  0405 04/19/24  0436   Glucose 89 87 94   Sodium 138 138 141   Potassium 4.0 4.9 5.2 H   Chloride 99 98 100   CO2 27 31 H 30 H   BUN 42 H 39 H 46 H   Creatinine 1.1 1.1 1.1   Calcium 9.4 9.6 9.8   Phosphorus 3.4  4.1 4.1   Magnesium 2.3 2.4 2.2     COAGS        Assessment  1. Excessive cerumen in both ear canals    2. Warthin tumor    3. H/O parotidectomy    4. Hypertrophic scar       Plan:  Discussed plan of care with patient in detail and all questions answered. Patient reported understanding of plan of care. I gave the patient the opportunity to ask questions and patient confirmed all questions answered to satisfaction.     Keloid/hypertrophic scar behind ear discussed derm and possible steroid injection and abouitappearnce and itching. Not havigna d ton of itching does not want derm ; discussed can try silicone sheet but if keloid would need steroid injection vs other procedure to treat. Counseled to wear sunscreen as well     Partial cerumen impactions right more than left - cleaned with operating head otoscope bilaterally  Numb in ear canal better but lobe still numb - discussed that may be permanent- can take a full year for recovery but suspect will not get feeling back in lobe. Can use lighted magnified mirror to help put in her earrings.     No contralateral parotid mass on prior imaging exams, no recurrence of mass and no new masses on exam today ( bilaterally)    F/u 6 months to check for wax- not completely blocked today so will try for 6 months out. She will notify me if she changes her mind and wants to see derm     I spent a total of 20 minutes on the day of the visit.  This includes face to face time and non-face to face time preparing to see the patient (eg, review of tests), obtaining and/or reviewing separately obtained history, documenting clinical information in the electronic or other health record, independently interpreting results and communicating results to the patient/family/caregiver, or care coordinator.   Please be aware that this note has been generated with the assistance of Carlos Alberto voice-to-text.  Please excuse any spelling or grammatical errors.

## 2024-06-27 ENCOUNTER — OFFICE VISIT (OUTPATIENT)
Dept: FAMILY MEDICINE | Facility: CLINIC | Age: 69
End: 2024-06-27
Payer: MEDICARE

## 2024-06-27 VITALS
SYSTOLIC BLOOD PRESSURE: 138 MMHG | BODY MASS INDEX: 34.67 KG/M2 | WEIGHT: 183.63 LBS | OXYGEN SATURATION: 94 % | HEART RATE: 85 BPM | TEMPERATURE: 98 F | DIASTOLIC BLOOD PRESSURE: 70 MMHG | HEIGHT: 61 IN

## 2024-06-27 DIAGNOSIS — I10 ESSENTIAL HYPERTENSION: ICD-10-CM

## 2024-06-27 DIAGNOSIS — J41.0 SIMPLE CHRONIC BRONCHITIS: Primary | ICD-10-CM

## 2024-06-27 DIAGNOSIS — J42 CHRONIC BRONCHITIS, UNSPECIFIED CHRONIC BRONCHITIS TYPE: ICD-10-CM

## 2024-06-27 DIAGNOSIS — E78.2 MIXED HYPERLIPIDEMIA: ICD-10-CM

## 2024-06-27 PROCEDURE — 1101F PT FALLS ASSESS-DOCD LE1/YR: CPT | Mod: CPTII,S$GLB,, | Performed by: INTERNAL MEDICINE

## 2024-06-27 PROCEDURE — 1126F AMNT PAIN NOTED NONE PRSNT: CPT | Mod: CPTII,S$GLB,, | Performed by: INTERNAL MEDICINE

## 2024-06-27 PROCEDURE — 3008F BODY MASS INDEX DOCD: CPT | Mod: CPTII,S$GLB,, | Performed by: INTERNAL MEDICINE

## 2024-06-27 PROCEDURE — 1159F MED LIST DOCD IN RCRD: CPT | Mod: CPTII,S$GLB,, | Performed by: INTERNAL MEDICINE

## 2024-06-27 PROCEDURE — 1160F RVW MEDS BY RX/DR IN RCRD: CPT | Mod: CPTII,S$GLB,, | Performed by: INTERNAL MEDICINE

## 2024-06-27 PROCEDURE — 4010F ACE/ARB THERAPY RXD/TAKEN: CPT | Mod: CPTII,S$GLB,, | Performed by: INTERNAL MEDICINE

## 2024-06-27 PROCEDURE — 99999 PR PBB SHADOW E&M-EST. PATIENT-LVL III: CPT | Mod: PBBFAC,,, | Performed by: INTERNAL MEDICINE

## 2024-06-27 PROCEDURE — 99214 OFFICE O/P EST MOD 30 MIN: CPT | Mod: S$GLB,,, | Performed by: INTERNAL MEDICINE

## 2024-06-27 PROCEDURE — 3078F DIAST BP <80 MM HG: CPT | Mod: CPTII,S$GLB,, | Performed by: INTERNAL MEDICINE

## 2024-06-27 PROCEDURE — 3075F SYST BP GE 130 - 139MM HG: CPT | Mod: CPTII,S$GLB,, | Performed by: INTERNAL MEDICINE

## 2024-06-27 PROCEDURE — 3288F FALL RISK ASSESSMENT DOCD: CPT | Mod: CPTII,S$GLB,, | Performed by: INTERNAL MEDICINE

## 2024-06-27 RX ORDER — ALBUTEROL SULFATE 90 UG/1
AEROSOL, METERED RESPIRATORY (INHALATION)
Qty: 25.5 G | Refills: 3 | Status: SHIPPED | OUTPATIENT
Start: 2024-06-27

## 2024-06-27 RX ORDER — LISINOPRIL AND HYDROCHLOROTHIAZIDE 20; 25 MG/1; MG/1
1 TABLET ORAL DAILY
Qty: 90 TABLET | Refills: 3 | Status: SHIPPED | OUTPATIENT
Start: 2024-06-27

## 2024-06-27 NOTE — PROGRESS NOTES
"Subjective:       Patient ID: Chanel Esparza is a 68 y.o. female.    Chief Complaint: Follow-up (4m f/u)    F/u chronic conditions    HPI: 69 y/o w /HTN COPD presents alone for followup. She admits to resuming smoking cigarettes this week but plans to quit again no supplemental oxygen no LE swelling breathing 'fine" using laba/ics daily no orthopnea or PND      Review of Systems   Constitutional:  Negative for activity change, appetite change, fatigue, fever and unexpected weight change.   HENT:  Negative for ear pain, rhinorrhea and sore throat.    Eyes:  Negative for discharge and visual disturbance.   Respiratory:  Negative for chest tightness, shortness of breath and wheezing.    Cardiovascular:  Negative for chest pain, palpitations and leg swelling.   Gastrointestinal:  Negative for abdominal pain, constipation and diarrhea.   Endocrine: Negative for cold intolerance and heat intolerance.   Genitourinary:  Negative for dysuria and hematuria.   Musculoskeletal:  Negative for joint swelling and neck stiffness.   Skin:  Negative for rash.   Neurological:  Negative for dizziness, syncope, weakness and headaches.   Psychiatric/Behavioral:  Negative for suicidal ideas.        Objective:     Vitals:    06/27/24 0920   BP: 138/70   BP Location: Left arm   Patient Position: Sitting   BP Method: Large (Manual)   Pulse: 85   Temp: 97.5 °F (36.4 °C)   TempSrc: Oral   SpO2: (!) 94%   Weight: 83.3 kg (183 lb 10.3 oz)   Height: 5' 1" (1.549 m)          Physical Exam  Constitutional:       General: She is not in acute distress.     Appearance: She is well-developed. She is obese.   HENT:      Head: Normocephalic and atraumatic.   Eyes:      General: No scleral icterus.     Conjunctiva/sclera: Conjunctivae normal.   Cardiovascular:      Rate and Rhythm: Normal rate and regular rhythm.      Heart sounds: No murmur heard.     No friction rub. No gallop.   Pulmonary:      Effort: Pulmonary effort is normal.      Breath sounds: Normal " breath sounds. No wheezing or rales.   Abdominal:      Palpations: Abdomen is soft.      Tenderness: There is no abdominal tenderness. There is no guarding or rebound.   Musculoskeletal:         General: No tenderness. Normal range of motion.      Cervical back: Normal range of motion.      Right lower leg: No edema.      Left lower leg: No edema.   Skin:     General: Skin is warm and dry.   Neurological:      Mental Status: She is alert and oriented to person, place, and time.      Cranial Nerves: No cranial nerve deficit.   Psychiatric:         Mood and Affect: Mood normal.         Behavior: Behavior normal.         Thought Content: Thought content normal.         Assessment and Plan   1. Simple chronic bronchitis  Normal pulmonary exam continue inhalers  - CBC Without Differential; Future    2. Essential hypertension  Bp at goal no medicaiton adjustamne repeat labs to monitor for end organ dysfunction prior to return visit  - Comprehensive Metabolic Panel; Future  - lisinopriL-hydrochlorothiazide (PRINZIDE,ZESTORETIC) 20-25 mg Tab; Take 1 tablet by mouth once daily.  Dispense: 90 tablet; Refill: 3    3. Mixed hyperlipidemia  On statin continue  - Lipid Panel; Future    4. Chronic bronchitis, unspecified chronic bronchitis type  Stable inhalers as below  - fluticasone-umeclidin-vilanter (TRELEGY ELLIPTA) 100-62.5-25 mcg DsDv; Inhale 1 puff into the lungs once daily.  Dispense: 180 each; Refill: 3  - albuterol (PROVENTIL/VENTOLIN HFA) 90 mcg/actuation inhaler; INHALE 2 PUFF INTO LUNGS EVERY 6 HOURS AS NEEDED FOR WHEEZING  Dispense: 25.5 g; Refill: 3

## 2024-07-15 PROBLEM — J96.02 ACUTE RESPIRATORY FAILURE WITH HYPOXIA AND HYPERCAPNIA: Status: RESOLVED | Noted: 2024-04-15 | Resolved: 2024-07-15

## 2024-07-15 PROBLEM — J96.01 ACUTE RESPIRATORY FAILURE WITH HYPOXIA AND HYPERCAPNIA: Status: RESOLVED | Noted: 2024-04-15 | Resolved: 2024-07-15

## 2024-09-10 DIAGNOSIS — J42 CHRONIC BRONCHITIS, UNSPECIFIED CHRONIC BRONCHITIS TYPE: ICD-10-CM

## 2024-09-10 RX ORDER — ALBUTEROL SULFATE 90 UG/1
INHALANT RESPIRATORY (INHALATION)
Qty: 25.5 G | Refills: 3 | Status: SHIPPED | OUTPATIENT
Start: 2024-09-10

## 2024-09-10 NOTE — TELEPHONE ENCOUNTER
No care due was identified.  [facility] Embedded Care Due Messages. Reference number: [id number].   9/10/2024 10:31:58 AM CDT

## 2024-09-10 NOTE — TELEPHONE ENCOUNTER
Refill Decision Note   Chanel Esparza  is requesting a refill authorization.  Brief Assessment and Rationale for Refill:  Approve     Medication Therapy Plan:        Comments:     Note composed:12:25 PM 09/10/2024

## 2024-11-01 ENCOUNTER — PATIENT OUTREACH (OUTPATIENT)
Dept: ADMINISTRATIVE | Facility: HOSPITAL | Age: 69
End: 2024-11-01
Payer: MEDICARE

## 2024-11-01 DIAGNOSIS — R73.03 PREDIABETES: Primary | ICD-10-CM

## 2024-11-04 ENCOUNTER — LAB VISIT (OUTPATIENT)
Dept: LAB | Facility: HOSPITAL | Age: 69
End: 2024-11-04
Attending: INTERNAL MEDICINE
Payer: MEDICARE

## 2024-11-04 DIAGNOSIS — E78.2 MIXED HYPERLIPIDEMIA: ICD-10-CM

## 2024-11-04 DIAGNOSIS — R73.03 PREDIABETES: ICD-10-CM

## 2024-11-04 DIAGNOSIS — I10 ESSENTIAL HYPERTENSION: ICD-10-CM

## 2024-11-04 DIAGNOSIS — J41.0 SIMPLE CHRONIC BRONCHITIS: ICD-10-CM

## 2024-11-04 LAB
ALBUMIN SERPL BCP-MCNC: 3.6 G/DL (ref 3.5–5.2)
ALP SERPL-CCNC: 64 U/L (ref 40–150)
ALT SERPL W/O P-5'-P-CCNC: 21 U/L (ref 10–44)
ANION GAP SERPL CALC-SCNC: 9 MMOL/L (ref 8–16)
AST SERPL-CCNC: 26 U/L (ref 10–40)
BILIRUB SERPL-MCNC: 0.7 MG/DL (ref 0.1–1)
BUN SERPL-MCNC: 22 MG/DL (ref 8–23)
CALCIUM SERPL-MCNC: 9.9 MG/DL (ref 8.7–10.5)
CHLORIDE SERPL-SCNC: 102 MMOL/L (ref 95–110)
CHOLEST SERPL-MCNC: 136 MG/DL (ref 120–199)
CHOLEST/HDLC SERPL: 2.6 {RATIO} (ref 2–5)
CO2 SERPL-SCNC: 28 MMOL/L (ref 23–29)
CREAT SERPL-MCNC: 1.1 MG/DL (ref 0.5–1.4)
ERYTHROCYTE [DISTWIDTH] IN BLOOD BY AUTOMATED COUNT: 13.8 % (ref 11.5–14.5)
EST. GFR  (NO RACE VARIABLE): 54 ML/MIN/1.73 M^2
ESTIMATED AVG GLUCOSE: 100 MG/DL (ref 68–131)
GLUCOSE SERPL-MCNC: 102 MG/DL (ref 70–110)
HBA1C MFR BLD: 5.1 % (ref 4–5.6)
HCT VFR BLD AUTO: 49.8 % (ref 37–48.5)
HDLC SERPL-MCNC: 52 MG/DL (ref 40–75)
HDLC SERPL: 38.2 % (ref 20–50)
HGB BLD-MCNC: 16.2 G/DL (ref 12–16)
LDLC SERPL CALC-MCNC: 69.4 MG/DL (ref 63–159)
MCH RBC QN AUTO: 29.8 PG (ref 27–31)
MCHC RBC AUTO-ENTMCNC: 32.5 G/DL (ref 32–36)
MCV RBC AUTO: 92 FL (ref 82–98)
NONHDLC SERPL-MCNC: 84 MG/DL
PLATELET # BLD AUTO: 179 K/UL (ref 150–450)
PMV BLD AUTO: 12.3 FL (ref 9.2–12.9)
POTASSIUM SERPL-SCNC: 4.1 MMOL/L (ref 3.5–5.1)
PROT SERPL-MCNC: 7.3 G/DL (ref 6–8.4)
RBC # BLD AUTO: 5.43 M/UL (ref 4–5.4)
SODIUM SERPL-SCNC: 139 MMOL/L (ref 136–145)
TRIGL SERPL-MCNC: 73 MG/DL (ref 30–150)
WBC # BLD AUTO: 7.96 K/UL (ref 3.9–12.7)

## 2024-11-04 PROCEDURE — 83036 HEMOGLOBIN GLYCOSYLATED A1C: CPT | Performed by: INTERNAL MEDICINE

## 2024-11-04 PROCEDURE — 80053 COMPREHEN METABOLIC PANEL: CPT | Performed by: INTERNAL MEDICINE

## 2024-11-04 PROCEDURE — 85027 COMPLETE CBC AUTOMATED: CPT | Performed by: INTERNAL MEDICINE

## 2024-11-04 PROCEDURE — 80061 LIPID PANEL: CPT | Performed by: INTERNAL MEDICINE

## 2024-11-06 ENCOUNTER — OFFICE VISIT (OUTPATIENT)
Dept: FAMILY MEDICINE | Facility: CLINIC | Age: 69
End: 2024-11-06
Payer: MEDICARE

## 2024-11-06 VITALS
OXYGEN SATURATION: 96 % | HEART RATE: 83 BPM | WEIGHT: 174.63 LBS | SYSTOLIC BLOOD PRESSURE: 126 MMHG | DIASTOLIC BLOOD PRESSURE: 78 MMHG | BODY MASS INDEX: 32.97 KG/M2 | HEIGHT: 61 IN | TEMPERATURE: 99 F

## 2024-11-06 DIAGNOSIS — I10 ESSENTIAL HYPERTENSION: ICD-10-CM

## 2024-11-06 DIAGNOSIS — I70.0 AORTIC ATHEROSCLEROSIS: ICD-10-CM

## 2024-11-06 DIAGNOSIS — Z23 NEED FOR INFLUENZA VACCINATION: ICD-10-CM

## 2024-11-06 DIAGNOSIS — J42 CHRONIC BRONCHITIS, UNSPECIFIED CHRONIC BRONCHITIS TYPE: Primary | ICD-10-CM

## 2024-11-06 DIAGNOSIS — F17.200 TOBACCO DEPENDENCE: ICD-10-CM

## 2024-11-06 PROCEDURE — 3008F BODY MASS INDEX DOCD: CPT | Mod: CPTII,S$GLB,, | Performed by: INTERNAL MEDICINE

## 2024-11-06 PROCEDURE — 1159F MED LIST DOCD IN RCRD: CPT | Mod: CPTII,S$GLB,, | Performed by: INTERNAL MEDICINE

## 2024-11-06 PROCEDURE — 99999 PR PBB SHADOW E&M-EST. PATIENT-LVL IV: CPT | Mod: PBBFAC,,, | Performed by: INTERNAL MEDICINE

## 2024-11-06 PROCEDURE — 3074F SYST BP LT 130 MM HG: CPT | Mod: CPTII,S$GLB,, | Performed by: INTERNAL MEDICINE

## 2024-11-06 PROCEDURE — G0008 ADMIN INFLUENZA VIRUS VAC: HCPCS | Mod: S$GLB,,, | Performed by: INTERNAL MEDICINE

## 2024-11-06 PROCEDURE — 1101F PT FALLS ASSESS-DOCD LE1/YR: CPT | Mod: CPTII,S$GLB,, | Performed by: INTERNAL MEDICINE

## 2024-11-06 PROCEDURE — 4010F ACE/ARB THERAPY RXD/TAKEN: CPT | Mod: CPTII,S$GLB,, | Performed by: INTERNAL MEDICINE

## 2024-11-06 PROCEDURE — 1160F RVW MEDS BY RX/DR IN RCRD: CPT | Mod: CPTII,S$GLB,, | Performed by: INTERNAL MEDICINE

## 2024-11-06 PROCEDURE — 3044F HG A1C LEVEL LT 7.0%: CPT | Mod: CPTII,S$GLB,, | Performed by: INTERNAL MEDICINE

## 2024-11-06 PROCEDURE — 1126F AMNT PAIN NOTED NONE PRSNT: CPT | Mod: CPTII,S$GLB,, | Performed by: INTERNAL MEDICINE

## 2024-11-06 PROCEDURE — 99214 OFFICE O/P EST MOD 30 MIN: CPT | Mod: 25,S$GLB,, | Performed by: INTERNAL MEDICINE

## 2024-11-06 PROCEDURE — 90653 IIV ADJUVANT VACCINE IM: CPT | Mod: S$GLB,,, | Performed by: INTERNAL MEDICINE

## 2024-11-06 PROCEDURE — 3288F FALL RISK ASSESSMENT DOCD: CPT | Mod: CPTII,S$GLB,, | Performed by: INTERNAL MEDICINE

## 2024-11-06 PROCEDURE — 3078F DIAST BP <80 MM HG: CPT | Mod: CPTII,S$GLB,, | Performed by: INTERNAL MEDICINE

## 2024-11-06 RX ORDER — ALBUTEROL SULFATE 90 UG/1
INHALANT RESPIRATORY (INHALATION)
Qty: 25.5 G | Refills: 3 | Status: SHIPPED | OUTPATIENT
Start: 2024-11-06

## 2024-11-06 RX ORDER — FLUTICASONE FUROATE, UMECLIDINIUM BROMIDE AND VILANTEROL TRIFENATATE 100; 62.5; 25 UG/1; UG/1; UG/1
1 POWDER RESPIRATORY (INHALATION) DAILY
Qty: 180 EACH | Refills: 3 | Status: SHIPPED | OUTPATIENT
Start: 2024-11-06

## 2024-11-06 NOTE — PROGRESS NOTES
"Subjective:       Patient ID: Chanel Esparza is a 69 y.o. female.    Chief Complaint: Follow-up (4m f/u)    F/u chronic conditions    HPI: 70 y/o w/ HTN COPD tobacco dependence presents alone for scheduled follow up. Feels well is interested in assistance with quitting smoking no LE swelling using maintenance inhaler daily as prescribed no change in sputum production       Review of Systems   Constitutional:  Negative for activity change, appetite change, fatigue, fever and unexpected weight change.   HENT:  Negative for ear pain, rhinorrhea and sore throat.    Eyes:  Negative for discharge and visual disturbance.   Respiratory:  Negative for chest tightness, shortness of breath and wheezing.    Cardiovascular:  Negative for chest pain, palpitations and leg swelling.   Gastrointestinal:  Negative for abdominal pain, constipation and diarrhea.   Endocrine: Negative for cold intolerance and heat intolerance.   Genitourinary:  Negative for dysuria and hematuria.   Musculoskeletal:  Negative for joint swelling and neck stiffness.   Skin:  Negative for rash.   Neurological:  Negative for dizziness, syncope, weakness and headaches.   Psychiatric/Behavioral:  Negative for suicidal ideas.        Objective:     Vitals:    11/06/24 0909   BP: 126/78   BP Location: Left arm   Patient Position: Sitting   Pulse: 83   Temp: 98.6 °F (37 °C)   TempSrc: Oral   SpO2: 96%   Weight: 79.2 kg (174 lb 9.7 oz)   Height: 5' 1" (1.549 m)          Physical Exam  Constitutional:       Appearance: She is well-developed.   HENT:      Head: Normocephalic and atraumatic.   Eyes:      General: No scleral icterus.     Conjunctiva/sclera: Conjunctivae normal.   Cardiovascular:      Rate and Rhythm: Normal rate and regular rhythm.      Heart sounds: No murmur heard.     No friction rub. No gallop.   Pulmonary:      Effort: Pulmonary effort is normal.      Breath sounds: Normal breath sounds. No wheezing or rales.   Abdominal:      Palpations: Abdomen is " soft.      Tenderness: There is no abdominal tenderness. There is no guarding or rebound.   Musculoskeletal:         General: No tenderness. Normal range of motion.      Cervical back: Normal range of motion.      Right lower leg: No edema.      Left lower leg: No edema.   Skin:     General: Skin is warm and dry.   Neurological:      General: No focal deficit present.      Mental Status: She is alert and oriented to person, place, and time.      Cranial Nerves: No cranial nerve deficit.         Assessment and Plan   1. Chronic bronchitis, unspecified chronic bronchitis type (Primary)  Normal pulmonary exam continue trelegy maintenance inhaler  - fluticasone-umeclidin-vilanter (TRELEGY ELLIPTA) 100-62.5-25 mcg DsDv; Inhale 1 puff into the lungs once daily.  Dispense: 180 each; Refill: 3  - albuterol (PROVENTIL/VENTOLIN HFA) 90 mcg/actuation inhaler; INHALE 2 PUFFS INTO THE LUNGS EVERY 6 HOURS AS NEEDED FOR WHEEZING  Dispense: 25.5 g; Refill: 3    2. Essential hypertension  Bp at goal no adjustements today contineu acei and thiazide electrolytes   - CBC Without Differential; Future  - Comprehensive Metabolic Panel; Future    3. Need for influenza vaccination  Flu vaccine  - influenza (adjuvanted) (Fluad) 45 mcg/0.5 mL IM vaccine (> or = 66 yo) 0.5 mL    4. Tobacco dependence  Referral for smoking cessation  - Ambulatory referral/consult to Smoking Cessation Program; Future    5. Aortic atherosclerosis  Continue statin therapy recent ldl at goal

## 2024-11-27 ENCOUNTER — TELEPHONE (OUTPATIENT)
Dept: FAMILY MEDICINE | Facility: CLINIC | Age: 69
End: 2024-11-27
Payer: MEDICARE

## 2024-11-27 ENCOUNTER — HOSPITAL ENCOUNTER (OUTPATIENT)
Dept: RADIOLOGY | Facility: HOSPITAL | Age: 69
Discharge: HOME | End: 2024-11-27
Payer: MEDICARE

## 2024-11-27 ENCOUNTER — OFFICE VISIT (OUTPATIENT)
Dept: FAMILY MEDICINE | Facility: CLINIC | Age: 69
End: 2024-11-27
Payer: MEDICARE

## 2024-11-27 VITALS
DIASTOLIC BLOOD PRESSURE: 80 MMHG | HEART RATE: 99 BPM | WEIGHT: 175.5 LBS | SYSTOLIC BLOOD PRESSURE: 160 MMHG | OXYGEN SATURATION: 93 % | HEIGHT: 61 IN | TEMPERATURE: 98 F | BODY MASS INDEX: 33.14 KG/M2

## 2024-11-27 DIAGNOSIS — M79.89 PAIN AND SWELLING OF LEFT LOWER LEG: Primary | ICD-10-CM

## 2024-11-27 DIAGNOSIS — M62.838 MUSCLE SPASM: Primary | ICD-10-CM

## 2024-11-27 DIAGNOSIS — M79.662 PAIN AND SWELLING OF LEFT LOWER LEG: ICD-10-CM

## 2024-11-27 DIAGNOSIS — M79.662 PAIN AND SWELLING OF LEFT LOWER LEG: Primary | ICD-10-CM

## 2024-11-27 DIAGNOSIS — F17.200 TOBACCO DEPENDENCE: ICD-10-CM

## 2024-11-27 DIAGNOSIS — I10 ESSENTIAL HYPERTENSION: ICD-10-CM

## 2024-11-27 DIAGNOSIS — M79.89 PAIN AND SWELLING OF LEFT LOWER LEG: ICD-10-CM

## 2024-11-27 PROCEDURE — 3008F BODY MASS INDEX DOCD: CPT | Mod: CPTII,S$GLB,,

## 2024-11-27 PROCEDURE — 3079F DIAST BP 80-89 MM HG: CPT | Mod: CPTII,S$GLB,,

## 2024-11-27 PROCEDURE — 3044F HG A1C LEVEL LT 7.0%: CPT | Mod: CPTII,S$GLB,,

## 2024-11-27 PROCEDURE — 93971 EXTREMITY STUDY: CPT | Mod: 26,LT,, | Performed by: RADIOLOGY

## 2024-11-27 PROCEDURE — 1159F MED LIST DOCD IN RCRD: CPT | Mod: CPTII,S$GLB,,

## 2024-11-27 PROCEDURE — 3288F FALL RISK ASSESSMENT DOCD: CPT | Mod: CPTII,S$GLB,,

## 2024-11-27 PROCEDURE — 1160F RVW MEDS BY RX/DR IN RCRD: CPT | Mod: CPTII,S$GLB,,

## 2024-11-27 PROCEDURE — 93971 EXTREMITY STUDY: CPT | Mod: TC,LT

## 2024-11-27 PROCEDURE — 99213 OFFICE O/P EST LOW 20 MIN: CPT | Mod: S$GLB,,,

## 2024-11-27 PROCEDURE — 99999 PR PBB SHADOW E&M-EST. PATIENT-LVL IV: CPT | Mod: PBBFAC,,,

## 2024-11-27 PROCEDURE — 1101F PT FALLS ASSESS-DOCD LE1/YR: CPT | Mod: CPTII,S$GLB,,

## 2024-11-27 PROCEDURE — 3077F SYST BP >= 140 MM HG: CPT | Mod: CPTII,S$GLB,,

## 2024-11-27 PROCEDURE — 1125F AMNT PAIN NOTED PAIN PRSNT: CPT | Mod: CPTII,S$GLB,,

## 2024-11-27 PROCEDURE — 4010F ACE/ARB THERAPY RXD/TAKEN: CPT | Mod: CPTII,S$GLB,,

## 2024-11-27 RX ORDER — TIZANIDINE 4 MG/1
4 TABLET ORAL DAILY PRN
Qty: 7 TABLET | Refills: 0 | Status: SHIPPED | OUTPATIENT
Start: 2024-11-27
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
## 2025-03-10 ENCOUNTER — LAB VISIT (OUTPATIENT)
Dept: LAB | Facility: HOSPITAL | Age: 70
End: 2025-03-10
Attending: INTERNAL MEDICINE
Payer: MEDICARE

## 2025-03-10 DIAGNOSIS — I10 ESSENTIAL HYPERTENSION: ICD-10-CM

## 2025-03-10 LAB
ALBUMIN SERPL BCP-MCNC: 3.5 G/DL (ref 3.5–5.2)
ALP SERPL-CCNC: 70 U/L (ref 40–150)
ALT SERPL W/O P-5'-P-CCNC: 32 U/L (ref 10–44)
ANION GAP SERPL CALC-SCNC: 8 MMOL/L (ref 8–16)
AST SERPL-CCNC: 30 U/L (ref 10–40)
BILIRUB SERPL-MCNC: 0.6 MG/DL (ref 0.1–1)
BUN SERPL-MCNC: 21 MG/DL (ref 8–23)
CALCIUM SERPL-MCNC: 9.5 MG/DL (ref 8.7–10.5)
CHLORIDE SERPL-SCNC: 103 MMOL/L (ref 95–110)
CO2 SERPL-SCNC: 30 MMOL/L (ref 23–29)
CREAT SERPL-MCNC: 1 MG/DL (ref 0.5–1.4)
ERYTHROCYTE [DISTWIDTH] IN BLOOD BY AUTOMATED COUNT: 13.3 % (ref 11.5–14.5)
EST. GFR  (NO RACE VARIABLE): >60 ML/MIN/1.73 M^2
GLUCOSE SERPL-MCNC: 108 MG/DL (ref 70–110)
HCT VFR BLD AUTO: 51.4 % (ref 37–48.5)
HGB BLD-MCNC: 17 G/DL (ref 12–16)
MCH RBC QN AUTO: 30.5 PG (ref 27–31)
MCHC RBC AUTO-ENTMCNC: 33.1 G/DL (ref 32–36)
MCV RBC AUTO: 92 FL (ref 82–98)
PLATELET # BLD AUTO: 177 K/UL (ref 150–450)
PMV BLD AUTO: 11.5 FL (ref 9.2–12.9)
POTASSIUM SERPL-SCNC: 4.6 MMOL/L (ref 3.5–5.1)
PROT SERPL-MCNC: 7.5 G/DL (ref 6–8.4)
RBC # BLD AUTO: 5.58 M/UL (ref 4–5.4)
SODIUM SERPL-SCNC: 141 MMOL/L (ref 136–145)
WBC # BLD AUTO: 7.63 K/UL (ref 3.9–12.7)

## 2025-03-10 PROCEDURE — 80053 COMPREHEN METABOLIC PANEL: CPT | Performed by: INTERNAL MEDICINE

## 2025-03-10 PROCEDURE — 36415 COLL VENOUS BLD VENIPUNCTURE: CPT | Mod: PN | Performed by: INTERNAL MEDICINE

## 2025-03-10 PROCEDURE — 85027 COMPLETE CBC AUTOMATED: CPT | Performed by: INTERNAL MEDICINE

## 2025-03-13 ENCOUNTER — OFFICE VISIT (OUTPATIENT)
Dept: FAMILY MEDICINE | Facility: CLINIC | Age: 70
End: 2025-03-13
Payer: MEDICARE

## 2025-03-13 VITALS
HEIGHT: 61 IN | BODY MASS INDEX: 32.84 KG/M2 | DIASTOLIC BLOOD PRESSURE: 74 MMHG | OXYGEN SATURATION: 95 % | WEIGHT: 173.94 LBS | HEART RATE: 84 BPM | TEMPERATURE: 98 F | SYSTOLIC BLOOD PRESSURE: 146 MMHG

## 2025-03-13 DIAGNOSIS — I10 ESSENTIAL HYPERTENSION: Primary | ICD-10-CM

## 2025-03-13 DIAGNOSIS — J41.0 SIMPLE CHRONIC BRONCHITIS: ICD-10-CM

## 2025-03-13 DIAGNOSIS — F17.200 TOBACCO DEPENDENCE: ICD-10-CM

## 2025-03-13 DIAGNOSIS — E78.2 MIXED HYPERLIPIDEMIA: ICD-10-CM

## 2025-03-13 PROCEDURE — 99214 OFFICE O/P EST MOD 30 MIN: CPT | Mod: S$GLB,,, | Performed by: INTERNAL MEDICINE

## 2025-03-13 PROCEDURE — G2211 COMPLEX E/M VISIT ADD ON: HCPCS | Mod: S$GLB,,, | Performed by: INTERNAL MEDICINE

## 2025-03-13 PROCEDURE — 1101F PT FALLS ASSESS-DOCD LE1/YR: CPT | Mod: CPTII,S$GLB,, | Performed by: INTERNAL MEDICINE

## 2025-03-13 PROCEDURE — 1160F RVW MEDS BY RX/DR IN RCRD: CPT | Mod: CPTII,S$GLB,, | Performed by: INTERNAL MEDICINE

## 2025-03-13 PROCEDURE — 3008F BODY MASS INDEX DOCD: CPT | Mod: CPTII,S$GLB,, | Performed by: INTERNAL MEDICINE

## 2025-03-13 PROCEDURE — 3077F SYST BP >= 140 MM HG: CPT | Mod: CPTII,S$GLB,, | Performed by: INTERNAL MEDICINE

## 2025-03-13 PROCEDURE — 3078F DIAST BP <80 MM HG: CPT | Mod: CPTII,S$GLB,, | Performed by: INTERNAL MEDICINE

## 2025-03-13 PROCEDURE — 4010F ACE/ARB THERAPY RXD/TAKEN: CPT | Mod: CPTII,S$GLB,, | Performed by: INTERNAL MEDICINE

## 2025-03-13 PROCEDURE — 1126F AMNT PAIN NOTED NONE PRSNT: CPT | Mod: CPTII,S$GLB,, | Performed by: INTERNAL MEDICINE

## 2025-03-13 PROCEDURE — 99999 PR PBB SHADOW E&M-EST. PATIENT-LVL IV: CPT | Mod: PBBFAC,,, | Performed by: INTERNAL MEDICINE

## 2025-03-13 PROCEDURE — 3288F FALL RISK ASSESSMENT DOCD: CPT | Mod: CPTII,S$GLB,, | Performed by: INTERNAL MEDICINE

## 2025-03-13 PROCEDURE — 1159F MED LIST DOCD IN RCRD: CPT | Mod: CPTII,S$GLB,, | Performed by: INTERNAL MEDICINE

## 2025-03-13 RX ORDER — LISINOPRIL AND HYDROCHLOROTHIAZIDE 20; 25 MG/1; MG/1
1 TABLET ORAL DAILY
Qty: 90 TABLET | Refills: 3 | Status: SHIPPED | OUTPATIENT
Start: 2025-03-13

## 2025-03-13 RX ORDER — ATORVASTATIN CALCIUM 20 MG/1
20 TABLET, FILM COATED ORAL NIGHTLY
Qty: 90 TABLET | Refills: 3 | Status: SHIPPED | OUTPATIENT
Start: 2025-03-13

## 2025-03-13 NOTE — PROGRESS NOTES
"Subjective:       Patient ID: Chanel Esparza is a 69 y.o. female.    Chief Complaint: Follow-up (4m f/u)    F/u chronic conditions    HPI: 68 y/o w/ COPD HTN tobacco dependence presentsa lone for scheudled follow up. Feels well has resumed smoking cigarettes she is not ready to try to quit at this time using trelegy daily no wheezing still with persistent morning cough no hemoptysis weight stable no LE swelling no anti HTN medicaitons yet today      Review of Systems   Constitutional:  Negative for activity change, appetite change, fatigue, fever and unexpected weight change.   HENT:  Negative for ear pain, rhinorrhea and sore throat.    Eyes:  Negative for discharge and visual disturbance.   Respiratory:  Positive for cough. Negative for chest tightness, shortness of breath and wheezing.    Cardiovascular:  Negative for chest pain, palpitations and leg swelling.   Gastrointestinal:  Negative for abdominal pain, constipation and diarrhea.   Endocrine: Negative for cold intolerance and heat intolerance.   Genitourinary:  Negative for dysuria and hematuria.   Musculoskeletal:  Negative for joint swelling and neck stiffness.   Skin:  Negative for rash.   Neurological:  Negative for dizziness, syncope, weakness and headaches.   Psychiatric/Behavioral:  Negative for suicidal ideas.        Objective:     Vitals:    03/13/25 0846 03/13/25 0901   BP: (!) 170/76 (!) 146/74   BP Location: Right arm    Patient Position: Sitting    Pulse: 97 84   Temp: 97.8 °F (36.6 °C)    TempSrc: Oral    SpO2: 95%    Weight: 78.9 kg (173 lb 15.1 oz)    Height: 5' 1" (1.549 m)           Physical Exam  Constitutional:       General: She is not in acute distress.     Appearance: She is well-developed. She is obese.   HENT:      Head: Normocephalic and atraumatic.   Eyes:      Conjunctiva/sclera: Conjunctivae normal.   Cardiovascular:      Rate and Rhythm: Normal rate and regular rhythm.      Heart sounds: No murmur heard.     No friction rub. No " gallop.   Pulmonary:      Effort: Pulmonary effort is normal. No respiratory distress.      Breath sounds: Normal breath sounds. No wheezing or rales.   Abdominal:      General: Bowel sounds are normal.      Palpations: Abdomen is soft.      Tenderness: There is no abdominal tenderness. There is no guarding or rebound.   Musculoskeletal:         General: No tenderness. Normal range of motion.      Cervical back: Normal range of motion.      Right lower leg: No edema.      Left lower leg: No edema.   Skin:     General: Skin is warm and dry.   Neurological:      Mental Status: She is alert and oriented to person, place, and time.      Cranial Nerves: No cranial nerve deficit.         Assessment and Plan   1. Essential hypertension (Primary)  Bp just above goal but no medication taken yet today no change continue current thiazide and acie repeat electrolytes prior to return  - CBC Auto Differential; Future  - Comprehensive Metabolic Panel; Future  - lisinopriL-hydrochlorothiazide (PRINZIDE,ZESTORETIC) 20-25 mg Tab; Take 1 tablet by mouth once daily.  Dispense: 90 tablet; Refill: 3    2. Mixed hyperlipidemia  On statin continue  - atorvastatin (LIPITOR) 20 MG tablet; Take 1 tablet (20 mg total) by mouth every evening.  Dispense: 90 tablet; Refill: 3    3. Simple chronic bronchitis  Trelegy inhaler daily prn albuterol    4. Tobacco dependence   She is contemplative on quitting she declines lung cancer screening

## 2025-03-31 ENCOUNTER — PATIENT MESSAGE (OUTPATIENT)
Dept: ADMINISTRATIVE | Facility: HOSPITAL | Age: 70
End: 2025-03-31
Payer: MEDICARE

## 2025-06-09 ENCOUNTER — PATIENT MESSAGE (OUTPATIENT)
Dept: ADMINISTRATIVE | Facility: HOSPITAL | Age: 70
End: 2025-06-09
Payer: MEDICARE

## 2025-07-14 ENCOUNTER — LAB VISIT (OUTPATIENT)
Dept: LAB | Facility: HOSPITAL | Age: 70
End: 2025-07-14
Attending: INTERNAL MEDICINE
Payer: MEDICARE

## 2025-07-14 DIAGNOSIS — I10 ESSENTIAL HYPERTENSION: ICD-10-CM

## 2025-07-14 LAB
ABSOLUTE EOSINOPHIL (OHS): 0.26 K/UL
ABSOLUTE MONOCYTE (OHS): 0.8 K/UL (ref 0.3–1)
ABSOLUTE NEUTROPHIL COUNT (OHS): 5.34 K/UL (ref 1.8–7.7)
ALBUMIN SERPL BCP-MCNC: 3.3 G/DL (ref 3.5–5.2)
ALP SERPL-CCNC: 57 UNIT/L (ref 40–150)
ALT SERPL W/O P-5'-P-CCNC: 29 UNIT/L (ref 10–44)
ANION GAP (OHS): 10 MMOL/L (ref 8–16)
AST SERPL-CCNC: 28 UNIT/L (ref 11–45)
BASOPHILS # BLD AUTO: 0.04 K/UL
BASOPHILS NFR BLD AUTO: 0.5 %
BILIRUB SERPL-MCNC: 0.4 MG/DL (ref 0.1–1)
BUN SERPL-MCNC: 26 MG/DL (ref 8–23)
CALCIUM SERPL-MCNC: 9.2 MG/DL (ref 8.7–10.5)
CHLORIDE SERPL-SCNC: 104 MMOL/L (ref 95–110)
CO2 SERPL-SCNC: 27 MMOL/L (ref 23–29)
CREAT SERPL-MCNC: 1 MG/DL (ref 0.5–1.4)
ERYTHROCYTE [DISTWIDTH] IN BLOOD BY AUTOMATED COUNT: 13.4 % (ref 11.5–14.5)
GFR SERPLBLD CREATININE-BSD FMLA CKD-EPI: 60 ML/MIN/1.73/M2
GLUCOSE SERPL-MCNC: 104 MG/DL (ref 70–110)
HCT VFR BLD AUTO: 47.7 % (ref 37–48.5)
HGB BLD-MCNC: 15.3 GM/DL (ref 12–16)
IMM GRANULOCYTES # BLD AUTO: 0.04 K/UL (ref 0–0.04)
IMM GRANULOCYTES NFR BLD AUTO: 0.5 % (ref 0–0.5)
LYMPHOCYTES # BLD AUTO: 1.4 K/UL (ref 1–4.8)
MCH RBC QN AUTO: 30 PG (ref 27–31)
MCHC RBC AUTO-ENTMCNC: 32.1 G/DL (ref 32–36)
MCV RBC AUTO: 94 FL (ref 82–98)
NUCLEATED RBC (/100WBC) (OHS): 0 /100 WBC
PLATELET # BLD AUTO: 161 K/UL (ref 150–450)
PMV BLD AUTO: 12.2 FL (ref 9.2–12.9)
POTASSIUM SERPL-SCNC: 4.2 MMOL/L (ref 3.5–5.1)
PROT SERPL-MCNC: 6.9 GM/DL (ref 6–8.4)
RBC # BLD AUTO: 5.1 M/UL (ref 4–5.4)
RELATIVE EOSINOPHIL (OHS): 3.3 %
RELATIVE LYMPHOCYTE (OHS): 17.8 % (ref 18–48)
RELATIVE MONOCYTE (OHS): 10.2 % (ref 4–15)
RELATIVE NEUTROPHIL (OHS): 67.7 % (ref 38–73)
SODIUM SERPL-SCNC: 141 MMOL/L (ref 136–145)
WBC # BLD AUTO: 7.88 K/UL (ref 3.9–12.7)

## 2025-07-14 PROCEDURE — 85025 COMPLETE CBC W/AUTO DIFF WBC: CPT

## 2025-07-14 PROCEDURE — 36415 COLL VENOUS BLD VENIPUNCTURE: CPT | Mod: PN

## 2025-07-14 PROCEDURE — 82040 ASSAY OF SERUM ALBUMIN: CPT

## 2025-08-01 ENCOUNTER — TELEPHONE (OUTPATIENT)
Dept: FAMILY MEDICINE | Facility: CLINIC | Age: 70
End: 2025-08-01
Payer: MEDICARE

## 2025-08-01 NOTE — TELEPHONE ENCOUNTER
Copied from CRM #[id number]. Topic: Appointments - Appointment Scheduling  >> Aug 1, 2025  2:25 PM Harinder wrote:  Type: Patient Call Back    Who called: Patient     What is the request in detail: Pt is requesting a call back to schedule an appt for a follow up. Please advise       Would the patient rather a call back or a response via [facility]?  Call     Best call back number: [phone]     Additional Information:    Pt has been contacted and rescheduled.

## 2025-08-04 ENCOUNTER — OFFICE VISIT (OUTPATIENT)
Dept: FAMILY MEDICINE | Facility: CLINIC | Age: 70
End: 2025-08-04
Payer: MEDICARE

## 2025-08-04 VITALS
SYSTOLIC BLOOD PRESSURE: 146 MMHG | BODY MASS INDEX: 32.59 KG/M2 | HEART RATE: 99 BPM | TEMPERATURE: 98 F | WEIGHT: 172.63 LBS | DIASTOLIC BLOOD PRESSURE: 80 MMHG | OXYGEN SATURATION: 96 % | HEIGHT: 61 IN

## 2025-08-04 DIAGNOSIS — R73.03 PREDIABETES: ICD-10-CM

## 2025-08-04 DIAGNOSIS — J42 CHRONIC BRONCHITIS, UNSPECIFIED CHRONIC BRONCHITIS TYPE: ICD-10-CM

## 2025-08-04 DIAGNOSIS — I10 ESSENTIAL HYPERTENSION: Primary | ICD-10-CM

## 2025-08-04 DIAGNOSIS — I70.0 AORTIC ATHEROSCLEROSIS: ICD-10-CM

## 2025-08-04 DIAGNOSIS — E78.2 MIXED HYPERLIPIDEMIA: ICD-10-CM

## 2025-08-04 DIAGNOSIS — Z72.0 TOBACCO USE: ICD-10-CM

## 2025-08-04 DIAGNOSIS — J44.1 COPD EXACERBATION: ICD-10-CM

## 2025-08-04 PROBLEM — E66.01 MORBID OBESITY: Status: RESOLVED | Noted: 2020-01-13 | Resolved: 2025-08-04

## 2025-08-04 PROCEDURE — G2211 COMPLEX E/M VISIT ADD ON: HCPCS | Mod: S$GLB,,,

## 2025-08-04 PROCEDURE — 3077F SYST BP >= 140 MM HG: CPT | Mod: CPTII,S$GLB,,

## 2025-08-04 PROCEDURE — 1126F AMNT PAIN NOTED NONE PRSNT: CPT | Mod: CPTII,S$GLB,,

## 2025-08-04 PROCEDURE — 3008F BODY MASS INDEX DOCD: CPT | Mod: CPTII,S$GLB,,

## 2025-08-04 PROCEDURE — 99406 BEHAV CHNG SMOKING 3-10 MIN: CPT | Mod: S$GLB,,,

## 2025-08-04 PROCEDURE — 99214 OFFICE O/P EST MOD 30 MIN: CPT | Mod: 25,S$GLB,,

## 2025-08-04 PROCEDURE — 4010F ACE/ARB THERAPY RXD/TAKEN: CPT | Mod: CPTII,S$GLB,,

## 2025-08-04 PROCEDURE — 3288F FALL RISK ASSESSMENT DOCD: CPT | Mod: CPTII,S$GLB,,

## 2025-08-04 PROCEDURE — 1159F MED LIST DOCD IN RCRD: CPT | Mod: CPTII,S$GLB,,

## 2025-08-04 PROCEDURE — 99999 PR PBB SHADOW E&M-EST. PATIENT-LVL III: CPT | Mod: PBBFAC,,,

## 2025-08-04 PROCEDURE — 1101F PT FALLS ASSESS-DOCD LE1/YR: CPT | Mod: CPTII,S$GLB,,

## 2025-08-04 PROCEDURE — 3079F DIAST BP 80-89 MM HG: CPT | Mod: CPTII,S$GLB,,

## 2025-08-04 RX ORDER — IPRATROPIUM BROMIDE AND ALBUTEROL SULFATE 2.5; .5 MG/3ML; MG/3ML
3 SOLUTION RESPIRATORY (INHALATION) EVERY 6 HOURS PRN
Qty: 3 ML | Refills: 6 | Status: SHIPPED | OUTPATIENT
Start: 2025-08-04

## 2025-08-04 RX ORDER — BENZONATATE 100 MG/1
100 CAPSULE ORAL 3 TIMES DAILY PRN
Qty: 30 CAPSULE | Refills: 0 | Status: SHIPPED | OUTPATIENT
Start: 2025-08-04

## 2025-08-04 RX ORDER — LISINOPRIL AND HYDROCHLOROTHIAZIDE 20; 25 MG/1; MG/1
1 TABLET ORAL DAILY
Qty: 90 TABLET | Refills: 3 | Status: SHIPPED | OUTPATIENT
Start: 2025-08-04

## 2025-08-04 RX ORDER — PREDNISONE 20 MG/1
40 TABLET ORAL DAILY
Qty: 10 TABLET | Refills: 0 | Status: SHIPPED | OUTPATIENT
Start: 2025-08-04 | End: 2025-08-09

## 2025-08-04 NOTE — PROGRESS NOTES
HPI     Chanel Esparza is a 69 y.o. female with multiple medical diagnoses as listed in the medical history and problem list that presents for   Chief Complaint   Patient presents with    Follow-up       HPI  Pt presents today for her routine 4 months f/u.   She's still been experiencing her chronic bronchitis, coughing daily. She is utilizing her Trelegy inhaler daily, and is still using her Albuterol at least four times a day. She is currently still smoking, about a pack a day, has no plans to quit currently. She states cough has not worsened, however, is not improving. She endorses mild shortness of breath, which has also remained about the same for her.  Pertinent negative listed in ROS.    Assessment & Plan     1. Essential hypertension  - Blood pressure today elevated in clinic. Recheck w/nurse visit in 2 weeks. Advised on decreasing tobacco use, may be contributing to elevated BP. If still elevated, will consider adding CCB.  - Recommend low-sodium diet and compliance with blood pressure medication.  - Keep blood pressure goal <140/90 and f/u with clinic if persistently elevated    - lisinopriL-hydrochlorothiazide (PRINZIDE,ZESTORETIC) 20-25 mg Tab; Take 1 tablet by mouth once daily.  Dispense: 90 tablet; Refill: 3  - Comprehensive Metabolic Panel; Future    2. Aortic atherosclerosis  - Noted on 4/15/24 CXR. Stable on statin therapy, continue w/current regimen.    3. Chronic bronchitis, unspecified chronic bronchitis type  - Wet sounding cough noted on exam, though lungs clear. Will provide short steroid boost, provided Duo-neb solution to use PRN.  - offered pulmonology referral for further assessment, patient declined.    - benzonatate (TESSALON) 100 MG capsule; Take 1 capsule (100 mg total) by mouth 3 (three) times daily as needed for Cough.  Dispense: 30 capsule; Refill: 0  - albuterol-ipratropium (DUO-NEB) 2.5 mg-0.5 mg/3 mL nebulizer solution; Take 3 mLs by nebulization every 6 (six) hours as needed for  Wheezing. Rescue  Dispense: 3 mL; Refill: 6  - CBC Without Differential; Future  - predniSONE (DELTASONE) 20 MG tablet; Take 2 tablets (40 mg total) by mouth once daily. for 5 days  Dispense: 10 tablet; Refill: 0    4. Mixed hyperlipidemia  - Patient tolerating statin well with no side effects.   - LDL and triglyerides at goal  - Current dose: Atorvastatin 20mg daily. Continue with current dosage.  - No mm aches or pain  - The 10-year ASCVD risk score (Alfredo BOGGS, et al., 2019) is: 25%    Values used to calculate the score:      Age: 69 years      Sex: Female      Is Non- : No      Diabetic: No      Tobacco smoker: Yes      Systolic Blood Pressure: 146 mmHg      Is BP treated: Yes      HDL Cholesterol: 52 mg/dL      Total Cholesterol: 136 mg/dL      - Lipid Panel; Future    5. Prediabetes  - A1C 5.1 on 12/24 labs. Stable, continue with lifestyle management.   - Lose weight and aim for a healthy weight.  Even losing 10% of your current weight can help.  - Focus on eliminating all added sugar in your diet and avoiding foods like rice and pasta.  - Focus on lean proteins and plenty of non-starchy vegetable at your meals.  - Exercise at least 30 minutes, at least 5 times per week.    - Hemoglobin A1C; Future    6. COPD exacerbation  - Wet sounding cough noted on exam, though lungs clear. Will provide short steroid boost, provided Duo-neb solution to use PRN.  - offered pulmonology referral for further assessment, patient declined.    - predniSONE (DELTASONE) 20 MG tablet; Take 2 tablets (40 mg total) by mouth once daily. for 5 days  Dispense: 10 tablet; Refill: 0    7. Tobacco use  -Counseled patient to quit tobacco usage, and advised on several options to quit and the benefits of quitting, which include but are not limited to: decreasing the risk of cancer, HTN, CVD, respiratory complications, among other diseases.  -5 minutes spent discussing cessation.   -pt is not interested in quitting.      --------------------------------------------    Health Maintenance         Date Due Completion Date    RSV Vaccine (Age 60+ and Pregnant patients) (1 - Risk 60-74 years 1-dose series) Never done ---    COVID-19 Vaccine (4 - 2024-25 season) 09/01/2024 12/22/2021    LDCT Lung Screen 11/06/2025 (Originally 9/21/2003) ---    Shingles Vaccine (1 of 2) 08/04/2026 (Originally 9/21/2003) ---    Mammogram 08/04/2026 (Originally 9/21/1973) ---    Influenza Vaccine (1) 09/01/2025 11/6/2024    TETANUS VACCINE 09/14/2025 9/14/2015    Override on 12/5/2014: Done    Lipid Panel 11/04/2029 11/4/2024            Health maintenance reviewed    Follow Up:  Follow up in about 4 months (around 12/4/2025).    Exam     Review of Systems:  (as noted above)  Review of Systems   Constitutional:  Negative for activity change, appetite change, fatigue, fever and unexpected weight change.   HENT:  Negative for ear pain, rhinorrhea and sore throat.    Eyes:  Negative for discharge and visual disturbance.   Respiratory:  Positive for cough. Negative for chest tightness, shortness of breath and wheezing.    Cardiovascular:  Negative for chest pain, palpitations and leg swelling.   Gastrointestinal:  Negative for abdominal pain, constipation and diarrhea.   Endocrine: Negative for cold intolerance and heat intolerance.   Genitourinary:  Negative for dysuria and hematuria.   Musculoskeletal:  Negative for joint swelling and neck stiffness.   Skin:  Negative for rash.   Neurological:  Negative for dizziness, syncope, weakness and headaches.   Psychiatric/Behavioral:  Negative for suicidal ideas.        Physical Exam  Constitutional:       General: She is not in acute distress.     Appearance: She is well-developed. She is obese.   HENT:      Head: Normocephalic and atraumatic.   Eyes:      Conjunctiva/sclera: Conjunctivae normal.   Cardiovascular:      Rate and Rhythm: Normal rate and regular rhythm.      Heart sounds: No murmur heard.     No  "friction rub. No gallop.   Pulmonary:      Effort: Pulmonary effort is normal. No respiratory distress.      Breath sounds: Normal breath sounds. No wheezing or rales.   Abdominal:      General: Bowel sounds are normal.      Palpations: Abdomen is soft.      Tenderness: There is no abdominal tenderness. There is no guarding or rebound.   Musculoskeletal:         General: No tenderness. Normal range of motion.      Cervical back: Normal range of motion.      Right lower leg: No edema.      Left lower leg: No edema.   Skin:     General: Skin is warm and dry.   Neurological:      Mental Status: She is alert and oriented to person, place, and time.      Cranial Nerves: No cranial nerve deficit.       Vitals:    08/04/25 0939 08/04/25 0955   BP: (!) 152/78 (!) 146/80   BP Location:  Right arm   Pulse: 99    Temp: 97.7 °F (36.5 °C)    TempSrc: Oral    SpO2: 96%    Weight: 78.3 kg (172 lb 9.9 oz)    Height: 5' 1" (1.549 m)       Body mass index is 32.62 kg/m².        History     Past Medical History:   Diagnosis Date    Asthma     Emphysema of lung     Hypertension        Family History   Problem Relation Name Age of Onset    Hypertension Mother Pat     Diabetes Sister Eli     Diabetes Brother Vincent        Allergies and Medications: (updated and reviewed)  Review of patient's allergies indicates:  No Known Allergies  Current Medications[1]    Patient Care Team:  Warner Richey MD as PCP - General (Internal Medicine)         - The patient is given an After Visit Summary that lists all medications with directions, allergies, education, orders placed during this encounter and follow-up instructions.      - I have reviewed the patient's medical information including past medical and family history sections including the medications and allergies.      - We discussed the patient's current medications.   This note was generated with the assistance of ambient listening technology. Verbal consent was obtained by the patient " and accompanying visitor(s) for the recording of patient appointment to facilitate this note. I attest to having reviewed and edited the generated note for accuracy, though some syntax or spelling errors may persist. Please contact the author of this note for any clarification.          Gigi Victoria NP                      [1]   Current Outpatient Medications   Medication Sig Dispense Refill    albuterol (PROVENTIL/VENTOLIN HFA) 90 mcg/actuation inhaler INHALE 2 PUFFS INTO THE LUNGS EVERY 6 HOURS AS NEEDED FOR WHEEZING 25.5 g 3    aspirin (ECOTRIN) 81 MG EC tablet Take 81 mg by mouth once daily.      atorvastatin (LIPITOR) 20 MG tablet Take 1 tablet (20 mg total) by mouth every evening. 90 tablet 3    CETIRIZINE HCL (ZYRTEC ORAL) Take by mouth.      fluticasone-umeclidin-vilanter (TRELEGY ELLIPTA) 100-62.5-25 mcg DsDv Inhale 1 puff into the lungs once daily. 180 each 3    albuterol-ipratropium (DUO-NEB) 2.5 mg-0.5 mg/3 mL nebulizer solution Take 3 mLs by nebulization every 6 (six) hours as needed for Wheezing. Rescue 3 mL 6    benzonatate (TESSALON) 100 MG capsule Take 1 capsule (100 mg total) by mouth 3 (three) times daily as needed for Cough. 30 capsule 0    lisinopriL-hydrochlorothiazide (PRINZIDE,ZESTORETIC) 20-25 mg Tab Take 1 tablet by mouth once daily. 90 tablet 3    predniSONE (DELTASONE) 20 MG tablet Take 2 tablets (40 mg total) by mouth once daily. for 5 days 10 tablet 0     No current facility-administered medications for this visit.

## 2025-08-18 ENCOUNTER — PATIENT OUTREACH (OUTPATIENT)
Dept: ADMINISTRATIVE | Facility: HOSPITAL | Age: 70
End: 2025-08-18
Payer: MEDICARE

## (undated) DEVICE — TRAY FOLEY 16FR INFECTION CONT

## (undated) DEVICE — STAPLER SKIN ROTATING HEAD

## (undated) DEVICE — ELECTRODE REM PLYHSV RETURN 9

## (undated) DEVICE — SEE MEDLINE ITEM 154981

## (undated) DEVICE — SPONGE PATTY SURGICAL .5X3IN

## (undated) DEVICE — HOOK LONE STAR BLUNT 12MM

## (undated) DEVICE — SET BLD COL SAFETY 23G 3/4 LL

## (undated) DEVICE — POSITIONER HEAD DONUT 9IN FOAM

## (undated) DEVICE — SUT VICRYL PLUS 3-0 SH 18IN

## (undated) DEVICE — SUT SILK 2-0 PS 18IN BLACK

## (undated) DEVICE — DRAIN SIL FLAT 10MM FULL PERF

## (undated) DEVICE — PACK HEAD & NECK

## (undated) DEVICE — SUT 4-0 VICRYL / P-3

## (undated) DEVICE — Device

## (undated) DEVICE — TOWEL OR DISP STRL BLUE 4/PK

## (undated) DEVICE — DEVICE MUCOSOL ATOMIZATION

## (undated) DEVICE — KIT ANTIFOG

## (undated) DEVICE — SEE MEDLINE ITEM 157194

## (undated) DEVICE — CONTAINER SPECIMEN STRL 4OZ

## (undated) DEVICE — BLANKET LOWER BODY 55.9X40.2IN

## (undated) DEVICE — SUT ETHILON BL MONO P3

## (undated) DEVICE — SPONGE KITTNER 1/4X 5/8 L STRL

## (undated) DEVICE — CORD FOR BIPOLAR FORCEPS 12

## (undated) DEVICE — SOL IRR SOD CHL .9% POUR

## (undated) DEVICE — SEE MEDLINE ITEM 107746

## (undated) DEVICE — EVACUATOR WOUND BULB 100CC

## (undated) DEVICE — SUT 5/0 18IN PLAIN FAST AB

## (undated) DEVICE — PROBE SIMULATOR KRAFF

## (undated) DEVICE — SEE MEDLINE ITEM 157110

## (undated) DEVICE — KIT CUSTOM MINOR PROC ST

## (undated) DEVICE — GLOVE SURGICAL LATEX SZ 6.5

## (undated) DEVICE — DRAPE STERI INSTRUMENT 1018

## (undated) DEVICE — SHEET DRAPE FAN-FOLDED 3/4

## (undated) DEVICE — ELECTRODE BLADE INSULATED 1 IN

## (undated) DEVICE — SPONGE GAUZE 16PLY 4X4

## (undated) DEVICE — SHEARS HARMONIC CRVD 9 CM

## (undated) DEVICE — TUBING SUC UNIV W/CONN 12FT

## (undated) DEVICE — SUPPORT ULNA NERVE PROTECTOR

## (undated) DEVICE — SUT SILK 3-0 CR/RB-1 8-18

## (undated) DEVICE — CANISTER SUCTION 2 LTR